# Patient Record
Sex: MALE | Race: WHITE | NOT HISPANIC OR LATINO | Employment: UNEMPLOYED | URBAN - NONMETROPOLITAN AREA
[De-identification: names, ages, dates, MRNs, and addresses within clinical notes are randomized per-mention and may not be internally consistent; named-entity substitution may affect disease eponyms.]

---

## 2017-01-18 DIAGNOSIS — M54.50 CHRONIC BILATERAL LOW BACK PAIN WITHOUT SCIATICA: ICD-10-CM

## 2017-01-18 DIAGNOSIS — G89.29 CHRONIC BILATERAL LOW BACK PAIN WITHOUT SCIATICA: ICD-10-CM

## 2017-01-18 RX ORDER — GABAPENTIN 400 MG/1
400 CAPSULE ORAL 3 TIMES DAILY
Qty: 90 CAPSULE | Refills: 2 | Status: SHIPPED | OUTPATIENT
Start: 2017-01-18 | End: 2017-05-10 | Stop reason: SDUPTHER

## 2017-01-20 ENCOUNTER — OFFICE VISIT (OUTPATIENT)
Dept: FAMILY MEDICINE CLINIC | Facility: CLINIC | Age: 52
End: 2017-01-20

## 2017-01-20 VITALS
WEIGHT: 183 LBS | HEIGHT: 70 IN | HEART RATE: 93 BPM | SYSTOLIC BLOOD PRESSURE: 124 MMHG | BODY MASS INDEX: 26.2 KG/M2 | DIASTOLIC BLOOD PRESSURE: 92 MMHG

## 2017-01-20 DIAGNOSIS — G89.29 CHRONIC BILATERAL LOW BACK PAIN WITHOUT SCIATICA: Primary | ICD-10-CM

## 2017-01-20 DIAGNOSIS — M54.50 CHRONIC BILATERAL LOW BACK PAIN WITHOUT SCIATICA: Primary | ICD-10-CM

## 2017-01-20 DIAGNOSIS — F41.1 GENERALIZED ANXIETY DISORDER: ICD-10-CM

## 2017-01-20 PROCEDURE — 99213 OFFICE O/P EST LOW 20 MIN: CPT | Performed by: FAMILY MEDICINE

## 2017-01-20 RX ORDER — PROPRANOLOL HYDROCHLORIDE 10 MG/1
TABLET ORAL
Refills: 4 | COMMUNITY
Start: 2017-01-11 | End: 2017-01-20

## 2017-01-20 NOTE — PROGRESS NOTES
Subjective   Tre Nielsen is a 51 y.o. male.     History of Present Illness   Mr. Nielsen presents today for follow-up on lower back pain.  He had an MRI since he was here last time.  He now has an appointment with Dr. Corbin.  He says that his pain has been pretty constant.  He had toradol and kenalog injections back in November and that seemed to help then but only lasted a short while.  Changed medication to celebrex and that hasn't seemed to pain.  Pain has been limiting his activity.  Pain is worse with movement.  He has done PT in the past.  He hasn't ever had epidural injections.    He says that he has had a lot of anxiety.  Has some slight depression as well.  He has been taking propranolol and buspar for that.  He doesn't feel that is helping him.  He feels nervous and anxious every time that he leaves the house.  Anxiety has gotten worse since his back pain has been getting worse.  He has been going to mental health downstairs.        Current Outpatient Prescriptions:   •  busPIRone (BUSPAR) 15 MG tablet, Take 15 mg by mouth 4 (Four) Times a Day., Disp: , Rfl:   •  celecoxib (CELEBREX) 200 MG capsule, Take 1 capsule by mouth 2 (Two) Times a Day., Disp: 60 capsule, Rfl: 2  •  clotrimazole-betamethasone (LOTRISONE) 1-0.05 % cream, Apply  topically 2 (two) times a day., Disp: 45 g, Rfl: 2  •  gabapentin (NEURONTIN) 400 MG capsule, Take 1 capsule by mouth 3 (Three) Times a Day., Disp: 90 capsule, Rfl: 2  •  propranolol (INDERAL) 10 MG tablet, TK 1 T PO BID PRN FOR ANXIETY, Disp: , Rfl: 4  •  simvastatin (ZOCOR) 5 MG tablet, Take 1 tablet by mouth every night., Disp: 90 tablet, Rfl: 2    The following portions of the patient's history were reviewed and updated as appropriate: allergies, current medications, past family history, past medical history, past social history, past surgical history and problem list.    Review of Systems   Constitutional: Positive for fatigue. Negative for activity change, appetite  "change and unexpected weight change.   Cardiovascular: Negative for chest pain, palpitations and leg swelling.   Gastrointestinal: Negative for abdominal distention, abdominal pain, constipation, nausea and vomiting.   Musculoskeletal: Positive for arthralgias, back pain, gait problem and myalgias. Negative for joint swelling.   Psychiatric/Behavioral: Positive for decreased concentration and dysphoric mood. Negative for self-injury, sleep disturbance and suicidal ideas. The patient is nervous/anxious.        Objective    Vitals:    01/20/17 1050   BP: 124/92   Pulse: 93   Weight: 183 lb (83 kg)   Height: 70\" (177.8 cm)     Physical Exam   Constitutional: He is oriented to person, place, and time. He appears well-developed and well-nourished. No distress.   Cardiovascular: Normal rate, regular rhythm and normal heart sounds.    No murmur heard.  No LE edema.   Pulmonary/Chest: Effort normal and breath sounds normal. No respiratory distress.   Abdominal: Soft. Bowel sounds are normal. He exhibits no distension. There is no tenderness.   Musculoskeletal:        Lumbar back: He exhibits tenderness, pain and spasm. He exhibits normal range of motion and no deformity.   Neurological: He is alert and oriented to person, place, and time.   Psychiatric: He has a normal mood and affect. His behavior is normal. Judgment and thought content normal.   Psychomotor agitation   Nursing note and vitals reviewed.      Assessment/Plan   Problems Addressed this Visit        Nervous and Auditory    Low back pain - Primary    Relevant Orders    Ambulatory Referral to Pain Management       Other    Generalized anxiety disorder    Relevant Medications    sertraline (ZOLOFT) 50 MG tablet        1.) Low back pain-  Continue current NSAIDS.  Keep appointment with Dr. Corbin.  Referred to Pain Management.    2.) CHENG-  Will try SSRI with buspar to see if that helps more.  RTC in 1.5 months or sooner PRN           "

## 2017-01-20 NOTE — MR AVS SNAPSHOT
Tre Nielsen   1/20/2017 11:00 AM   Office Visit    Dept Phone:  [phone]   Encounter #:  [id number]    Provider:  Erika Dykes MD   Department:  [facility] FAM[facility] MEDICINE                Your Full Care Plan              Today's Medication Changes          These changes are accurate as of: 1/20/17 11:38 AM.  If you have any questions, ask your nurse or doctor.               New Medication(s)Ordered:     betamethasone valerate 0.1 % ointment   Commonly known as:  VALISONE   Apply  topically 2 (Two) Times a Day.   Started by:  Erika Dykes MD       sertraline 50 MG tablet   Commonly known as:  ZOLOFT   Take 1 tablet by mouth Daily.   Started by:  Erika Dykes MD         Stop taking medication(s)listed here:     clotrimazole-betamethasone 1-0.05 % cream   Commonly known as:  LOTRISONE   Stopped by:  Erika Dykes MD           propranolol 10 MG tablet   Commonly known as:  INDERAL   Stopped by:  Erika Dykes MD                Where to Get Your Medications      These medications were sent to [facility] [address] [place] [address] [phone] [place] [id number] [address] [address]     Phone:  [phone]     betamethasone valerate 0.1 % ointment    sertraline 50 MG tablet                  Your Updated Medication List          This list is accurate as of: 1/20/17 11:38 AM.  Always use your most recent med list.                betamethasone valerate 0.1 % ointment   Commonly known as:  VALISONE   Apply  topically 2 (Two) Times a Day.       busPIRone 15 MG tablet   Commonly known as:  BUSPAR       celecoxib 200 MG capsule   Commonly known as:  CELEBREX   Take 1 capsule by mouth 2 (Two) Times a Day.       gabapentin 400 MG capsule   Commonly known as:  NEURONTIN   Take 1 capsule by mouth 3 (Three) Times a Day.       sertraline 50 MG tablet    "  Commonly known as:  ZOLOFT   Take 1 tablet by mouth Daily.       simvastatin 5 MG tablet   Commonly known as:  ZOCOR   Take 1 tablet by mouth every night.               We Performed the Following     Ambulatory Referral to Pain Management       You Were Diagnosed With        Codes Comments    Chronic bilateral low back pain without sciatica    -  Primary ICD-10-CM: M54.5, G89.29  ICD-9-CM: 724.2, 338.29     Generalized anxiety disorder     ICD-10-CM: F41.1  ICD-9-CM: 300.02       Instructions     None    Patient Instructions History      Upcoming Appointments     Visit Type Date Time Department    OFFICE VISIT 1/20/2017 11:00 AM [facility] 4TH    NEW PATIENT 1/23/2017  1:30 PM [facility] [place] [facility]    OFFICE VISIT 3/21/2017 10:45 AM [facility] [place] [facility] 4[facility]      MyChart Signup     Our records indicate that you have declined [facility] [facility] signup. If you would like to sign up for [employer], please email [email or web address] or call [phone] to obtain an activation code.             Other Info from Your Visit           Your Appointments     Jan 23, 2017  1:30 PM CST   New Patient with Gomez Corbin MD   [facility] ORTHOPEDICS (--)    [address]  [facility] [address] [phone]           Bring all previous medical records and films, along with current medications and insurance information.            Mar 21, 2017 10:45 AM CDT   Office Visit with Erika Dykes MD   [facility] FAMILY MEDICINE (--)    [address]  [address] [facility] [address] [phone]           Arrive 15 minutes prior to appointment.              Allergies     No Known Allergies      Reason for Visit     Follow-up recheck for chronic bilateral low back pain without sciatica    Elbow Pain chronic right elbow pain      Vital Signs     Blood Pressure Pulse Height Weight Body Mass Index Smoking Status    124/92 93 70\" (177.8 cm) " 183 lb (83 kg) 26.26 kg/m2 Current Every Day Smoker      Problems and Diagnoses Noted     Generalized anxiety disorder    Low back pain

## 2017-01-23 ENCOUNTER — OFFICE VISIT (OUTPATIENT)
Dept: ORTHOPEDIC SURGERY | Facility: CLINIC | Age: 52
End: 2017-01-23

## 2017-01-23 VITALS — WEIGHT: 184 LBS | HEIGHT: 70 IN | BODY MASS INDEX: 26.34 KG/M2

## 2017-01-23 DIAGNOSIS — G89.29 CHRONIC BILATERAL LOW BACK PAIN WITHOUT SCIATICA: Primary | ICD-10-CM

## 2017-01-23 DIAGNOSIS — M47.816 SPONDYLOSIS OF LUMBAR REGION WITHOUT MYELOPATHY OR RADICULOPATHY: ICD-10-CM

## 2017-01-23 DIAGNOSIS — M54.50 CHRONIC BILATERAL LOW BACK PAIN WITHOUT SCIATICA: Primary | ICD-10-CM

## 2017-01-23 PROCEDURE — 99203 OFFICE O/P NEW LOW 30 MIN: CPT | Performed by: ORTHOPAEDIC SURGERY

## 2017-01-23 NOTE — PROGRESS NOTES
Subjective   Tre Nielsen is a 51 y.o.  y.o. male    Chief Complaint   Patient presents with   • Lumbar Spine - Establish Care         Back Pain   This is a chronic problem. The current episode started more than 1 year ago. The problem occurs constantly. The problem is unchanged. The pain is present in the lumbar spine. The quality of the pain is described as burning, aching, cramping, shooting and stabbing. The pain is moderate. The pain is worse during the day. The symptoms are aggravated by bending, lying down, standing, sitting and twisting. Stiffness is present all day. Pertinent negatives include no abdominal pain, chest pain, dysuria, fever, headaches, numbness or weakness. Treatments tried: IM steroid injection. The treatment provided no relief.       Review of Systems   Constitutional: Positive for fatigue. Negative for appetite change, chills, diaphoresis, fever and unexpected weight change.   HENT: Negative.  Negative for congestion, dental problem, facial swelling, hearing loss, nosebleeds, postnasal drip, trouble swallowing and voice change.    Eyes: Negative.  Negative for pain, discharge, redness and itching.   Respiratory: Positive for cough. Negative for choking, chest tightness, shortness of breath, wheezing and stridor.    Cardiovascular: Negative.  Negative for chest pain, palpitations and leg swelling.   Gastrointestinal: Positive for nausea. Negative for abdominal pain, blood in stool, constipation and diarrhea.   Endocrine: Negative.  Negative for cold intolerance and heat intolerance.   Genitourinary: Negative.  Negative for dysuria, frequency, hematuria and urgency.   Musculoskeletal: Positive for back pain and neck pain. Negative for gait problem, joint swelling and neck stiffness.        Pain and stiffness in joints   Skin: Negative.  Negative for pallor and rash.   Allergic/Immunologic: Negative.    Neurological: Negative for syncope, facial asymmetry, speech difficulty, weakness,  "numbness and headaches.   Hematological: Negative.    Psychiatric/Behavioral: Positive for dysphoric mood. Negative for agitation, behavioral problems, confusion, decreased concentration and hallucinations. The patient is nervous/anxious. The patient is not hyperactive.        No Known Allergies      Current Outpatient Prescriptions:   •  betamethasone valerate (VALISONE) 0.1 % ointment, Apply  topically 2 (Two) Times a Day., Disp: 45 g, Rfl: 1  •  busPIRone (BUSPAR) 15 MG tablet, Take 15 mg by mouth 4 (Four) Times a Day., Disp: , Rfl:   •  celecoxib (CELEBREX) 200 MG capsule, Take 1 capsule by mouth 2 (Two) Times a Day., Disp: 60 capsule, Rfl: 2  •  gabapentin (NEURONTIN) 400 MG capsule, Take 1 capsule by mouth 3 (Three) Times a Day., Disp: 90 capsule, Rfl: 2  •  sertraline (ZOLOFT) 50 MG tablet, Take 1 tablet by mouth Daily., Disp: 30 tablet, Rfl: 2  •  simvastatin (ZOCOR) 5 MG tablet, Take 1 tablet by mouth every night., Disp: 90 tablet, Rfl: 2    No past surgical history on file.    Past Medical History   Diagnosis Date   • Candidiasis of skin and nails 02/03/2016   • Dyslipidemia 07/28/2015   • Joint pain 02/03/2016     RIGHT SHOULDER   • Low back pain 05/10/2016   • Muscle weakness 12/07/2015   • Pure hypercholesterolemia 07/28/2015   • Tobacco dependence syndrome 06/09/2015       Vitals:    01/23/17 1334   Weight: 184 lb (83.5 kg)   Height: 70\" (177.8 cm)           Objective     Physical Exam   Constitutional: He appears well-developed.   HENT:   Head: Normocephalic and atraumatic.   Eyes: Pupils are equal, round, and reactive to light. Right eye exhibits no discharge. Left eye exhibits no discharge.   Neck: Normal range of motion. No JVD present. No tracheal deviation present. No thyromegaly present.   Cardiovascular: Normal rate, regular rhythm, normal heart sounds and intact distal pulses.  Exam reveals no gallop and no friction rub.    No murmur heard.  Pulmonary/Chest: Effort normal and breath sounds " normal. No respiratory distress. He has no wheezes. He has no rales. He exhibits no tenderness.   Abdominal: Soft. Bowel sounds are normal. He exhibits no distension. There is no tenderness. There is no guarding.   Musculoskeletal: He exhibits no edema or deformity.        Thoracic back: He exhibits normal range of motion, no tenderness, no bony tenderness, no swelling, no edema, no deformity, no laceration and no pain.        Lumbar back: He exhibits decreased range of motion and tenderness. He exhibits no bony tenderness, no swelling, no edema, no deformity and no laceration.   Lymphadenopathy:     He has no cervical adenopathy.   Neurological: He is alert. He has normal reflexes. He displays normal reflexes. No cranial nerve deficit. Coordination normal.   Skin: Skin is warm. No rash noted. No erythema.   Psychiatric: He has a normal mood and affect. His behavior is normal. Thought content normal.     Tenderness: Lumbar   Swelling:  Mild       Range of Motion:      Flexion: 60     Extension: 5     Lateral Bend:   Right 10                              Left 10     Rotation:          Right 10                              Left 10       SLR:                  Right Negative                             Left Negative       Muscle Strength      Abductor: 5/5     Adductor: 5/5     Quadriceps: 5/5     Hamstrings: 5/5       Reflexes     Patellar: 1/4    Achilles: 1/4    Babinski: Not performed.    Biceps: 1/4    Triceps: 1/4       Sensation: Normal   Gait: Normal   Toe Walk: exam deferred   Heel Walk: exam deferred         Assessment:  Tre was seen today for establish care.    Diagnoses and all orders for this visit:    Chronic bilateral low back pain without sciatica        Plan:    Active Ambulatory Problems     Diagnosis Date Noted   • Tobacco dependence syndrome 06/09/2015   • Pure hypercholesterolemia 07/28/2015   • Low back pain 05/10/2016   • Dyslipidemia 07/28/2015   • Generalized anxiety disorder 01/20/2017   •  Chronic bilateral low back pain without sciatica 01/23/2017     Resolved Ambulatory Problems     Diagnosis Date Noted   • No Resolved Ambulatory Problems     Past Medical History   Diagnosis Date   • Candidiasis of skin and nails 02/03/2016   • Joint pain 02/03/2016   • Muscle weakness 12/07/2015         Recommend lumbar epidural steroid injection L4-5, left.  Risks of interventional pain management are reviewed with him.        Signed by, Gomez Corbin MD

## 2017-01-23 NOTE — LETTER
January 23, 2017    Tre Nielsen  [address] [facility] [address]        Lumbar transforaminal epidural steroid injection L4-5, left   [id number]                            Gomez Corbin MD

## 2017-01-23 NOTE — MR AVS SNAPSHOT
[facility] ORTHOPEDICS  [phone]                    Tre Nielsen   1/23/2017 1:30 PM   Office Visit    Dept Phone:  [phone]   Encounter #:  [id number]    Provider:  Gomez Corbin MD   Department:  [facility] ORTHOPEDICS                Your Full Care Plan              Your Updated Medication List          This list is accurate as of: 1/23/17  2:37 PM.  Always use your most recent med list.                betamethasone valerate 0.1 % ointment   Commonly known as:  VALISONE   Apply  topically 2 (Two) Times a Day.       busPIRone 15 MG tablet   Commonly known as:  BUSPAR       celecoxib 200 MG capsule   Commonly known as:  CELEBREX   Take 1 capsule by mouth 2 (Two) Times a Day.       gabapentin 400 MG capsule   Commonly known as:  NEURONTIN   Take 1 capsule by mouth 3 (Three) Times a Day.       sertraline 50 MG tablet   Commonly known as:  ZOLOFT   Take 1 tablet by mouth Daily.       simvastatin 5 MG tablet   Commonly known as:  ZOCOR   Take 1 tablet by mouth every night.               You Were Diagnosed With        Codes Comments    Chronic bilateral low back pain without sciatica    -  Primary ICD-10-CM: M54.5, G89.29  ICD-9-CM: 724.2, 338.29     Spondylosis of lumbar region without myelopathy or radiculopathy     ICD-10-CM: M47.816  ICD-9-CM: 721.3       Instructions     None    Patient Instructions History      Upcoming Appointments     Visit Type Date Time Department    NEW PATIENT 1/23/2017  1:30 PM [facility]    OFFICE VISIT 3/21/2017 10:45 AM [facility] 4[facility]    NEW PATIENT 5/1/2017  8:00 AM [facility]      MyChart Signup     Our records indicate that you have declined [facility] [employer] signup. If you would like to sign up for [employer], please email [email or web address] or call [phone] to obtain an activation code.             Other Info from Your Visit           Your Appointments     Mar 21, 2017 10:45 AM CDT    "  Office Visit with Erika Dykes MD   [facility] FAMILY MEDICINE (--)    [address]  [address] [facility] [address] [phone]           Arrive 15 minutes prior to appointment.            May 01, 2017  8:00 AM CDT   New Patient with LINDA Roberts   [facility] PAIN MANAGEMENT (--)    [address]  [address] [place] [address] [phone]           Bring all previous medical records and films, along with current medications and insurance information.              Allergies     No Known Allergies      Reason for Visit     Lumbar Spine - Establish Care           Vital Signs     Height Weight Body Mass Index Smoking Status          70\" (177.8 cm) 184 lb (83.5 kg) 26.4 kg/m2 Current Every Day Smoker        Problems and Diagnoses Noted     Chronic bilateral low back pain without sciatica    Degenerative arthritis of lumbar spine        "

## 2017-01-25 DIAGNOSIS — M54.50 CHRONIC BILATERAL LOW BACK PAIN WITHOUT SCIATICA: ICD-10-CM

## 2017-01-25 DIAGNOSIS — G89.29 CHRONIC BILATERAL LOW BACK PAIN WITHOUT SCIATICA: ICD-10-CM

## 2017-01-25 RX ORDER — CELECOXIB 200 MG/1
200 CAPSULE ORAL 2 TIMES DAILY
Qty: 60 CAPSULE | Refills: 2 | Status: SHIPPED | OUTPATIENT
Start: 2017-01-25 | End: 2017-05-08 | Stop reason: SDUPTHER

## 2017-02-03 ENCOUNTER — TELEPHONE (OUTPATIENT)
Dept: ORTHOPEDIC SURGERY | Facility: CLINIC | Age: 52
End: 2017-02-03

## 2017-02-17 ENCOUNTER — TELEPHONE (OUTPATIENT)
Dept: ORTHOPEDIC SURGERY | Facility: CLINIC | Age: 52
End: 2017-02-17

## 2017-02-17 NOTE — TELEPHONE ENCOUNTER
Pt called today asking about epidural injections.  i have tried to reach the patient on numerous occasions with no answer.  Tried again today no answer cass

## 2017-02-20 DIAGNOSIS — G89.29 CHRONIC BILATERAL LOW BACK PAIN WITHOUT SCIATICA: Primary | ICD-10-CM

## 2017-02-20 DIAGNOSIS — M54.50 CHRONIC BILATERAL LOW BACK PAIN WITHOUT SCIATICA: Primary | ICD-10-CM

## 2017-03-01 ENCOUNTER — HOSPITAL ENCOUNTER (OUTPATIENT)
Dept: INTERVENTIONAL RADIOLOGY/VASCULAR | Facility: HOSPITAL | Age: 52
Discharge: HOME | End: 2017-03-01
Admitting: ORTHOPAEDIC SURGERY

## 2017-03-01 VITALS
HEART RATE: 77 BPM | RESPIRATION RATE: 20 BRPM | DIASTOLIC BLOOD PRESSURE: 76 MMHG | OXYGEN SATURATION: 96 % | SYSTOLIC BLOOD PRESSURE: 106 MMHG

## 2017-03-01 DIAGNOSIS — G89.29 CHRONIC BILATERAL LOW BACK PAIN WITHOUT SCIATICA: ICD-10-CM

## 2017-03-01 DIAGNOSIS — M54.50 CHRONIC BILATERAL LOW BACK PAIN WITHOUT SCIATICA: ICD-10-CM

## 2017-03-01 PROCEDURE — 0 IOPAMIDOL 41 % SOLUTION: Performed by: ORTHOPAEDIC SURGERY

## 2017-03-01 PROCEDURE — 64483 NJX AA&/STRD TFRM EPI L/S 1: CPT | Performed by: ORTHOPAEDIC SURGERY

## 2017-03-01 PROCEDURE — 25010000002 METHYLPREDNISOLONE PER 80 MG: Performed by: ORTHOPAEDIC SURGERY

## 2017-03-01 RX ORDER — BUPIVACAINE HYDROCHLORIDE 5 MG/ML
INJECTION, SOLUTION EPIDURAL; INTRACAUDAL
Status: COMPLETED | OUTPATIENT
Start: 2017-03-01 | End: 2017-03-01

## 2017-03-01 RX ORDER — METHYLPREDNISOLONE ACETATE 80 MG/ML
INJECTION, SUSPENSION INTRA-ARTICULAR; INTRALESIONAL; INTRAMUSCULAR; SOFT TISSUE
Status: COMPLETED | OUTPATIENT
Start: 2017-03-01 | End: 2017-03-01

## 2017-03-01 RX ADMIN — METHYLPREDNISOLONE ACETATE 80 MG: 80 INJECTION, SUSPENSION INTRA-ARTICULAR; INTRALESIONAL; INTRAMUSCULAR; SOFT TISSUE at 14:23

## 2017-03-01 RX ADMIN — BUPIVACAINE HYDROCHLORIDE 10 ML: 5 INJECTION, SOLUTION EPIDURAL; INTRACAUDAL; PERINEURAL at 14:23

## 2017-03-01 RX ADMIN — IOPAMIDOL 5 ML: 408 INJECTION, SOLUTION INTRATHECAL at 14:22

## 2017-03-21 ENCOUNTER — OFFICE VISIT (OUTPATIENT)
Dept: FAMILY MEDICINE CLINIC | Facility: CLINIC | Age: 52
End: 2017-03-21

## 2017-03-21 VITALS
DIASTOLIC BLOOD PRESSURE: 82 MMHG | SYSTOLIC BLOOD PRESSURE: 110 MMHG | BODY MASS INDEX: 25.34 KG/M2 | HEIGHT: 70 IN | WEIGHT: 177 LBS

## 2017-03-21 DIAGNOSIS — G89.29 CHRONIC BILATERAL LOW BACK PAIN WITHOUT SCIATICA: ICD-10-CM

## 2017-03-21 DIAGNOSIS — M54.50 CHRONIC BILATERAL LOW BACK PAIN WITHOUT SCIATICA: ICD-10-CM

## 2017-03-21 DIAGNOSIS — F41.1 GENERALIZED ANXIETY DISORDER: Primary | ICD-10-CM

## 2017-03-21 PROCEDURE — 99213 OFFICE O/P EST LOW 20 MIN: CPT | Performed by: FAMILY MEDICINE

## 2017-03-21 RX ORDER — CYCLOBENZAPRINE HCL 10 MG
10 TABLET ORAL 2 TIMES DAILY PRN
Qty: 60 TABLET | Refills: 0 | Status: SHIPPED | OUTPATIENT
Start: 2017-03-21 | End: 2017-04-19 | Stop reason: SDUPTHER

## 2017-03-21 RX ORDER — BUSPIRONE HYDROCHLORIDE 7.5 MG/1
7.5 TABLET ORAL 3 TIMES DAILY
Qty: 90 TABLET | Refills: 2 | Status: SHIPPED | OUTPATIENT
Start: 2017-03-21 | End: 2017-04-18

## 2017-03-21 NOTE — PROGRESS NOTES
Subjective   Tre Nielsen is a 51 y.o. male.     History of Present Illness   Mr. Nielsen is a 50yo male that presents today for follow-up on anxiety. He was started on zoloft last time and that has really helped his anxiety. No side effects with her sertraline. Had stopped taking propranolol.  He has been having a lot of issues with back pain and joint pain.  He is on celebrex and that is helping with his arthritis pain.  He has been taking gabapentin and that is helping his back pain.  He saw Dr. Corbin and had back injections and those didn't seem to help.  He has an appointment with Pain Management in a month.  Says that his back pain is the worse.  It is about a 6/10.  Other joint pain isn't as severe.  He hasn't been taking any Tylenol between his celebrex. That alone hadn't helped.        Current Outpatient Prescriptions:   •  betamethasone valerate (VALISONE) 0.1 % ointment, Apply  topically 2 (Two) Times a Day., Disp: 45 g, Rfl: 1  •  busPIRone (BUSPAR) 15 MG tablet, Take 15 mg by mouth 4 (Four) Times a Day., Disp: , Rfl:   •  celecoxib (CELEBREX) 200 MG capsule, Take 1 capsule by mouth 2 (Two) Times a Day., Disp: 60 capsule, Rfl: 2  •  gabapentin (NEURONTIN) 400 MG capsule, Take 1 capsule by mouth 3 (Three) Times a Day., Disp: 90 capsule, Rfl: 2  •  sertraline (ZOLOFT) 50 MG tablet, Take 1 tablet by mouth Daily., Disp: 30 tablet, Rfl: 2  •  simvastatin (ZOCOR) 5 MG tablet, Take 1 tablet by mouth every night., Disp: 90 tablet, Rfl: 2    The following portions of the patient's history were reviewed and updated as appropriate: allergies, current medications, past family history, past medical history, past social history, past surgical history and problem list.    Review of Systems   Constitutional: Positive for fatigue. Negative for activity change, appetite change and unexpected weight change.   Respiratory: Negative for cough, chest tightness and shortness of breath.    Cardiovascular: Negative for chest  "pain, palpitations and leg swelling.   Gastrointestinal: Negative for abdominal distention, abdominal pain, constipation, nausea and vomiting.   Musculoskeletal: Positive for arthralgias, back pain, gait problem and myalgias. Negative for joint swelling.   Psychiatric/Behavioral: Negative for decreased concentration, dysphoric mood, self-injury, sleep disturbance and suicidal ideas. The patient is not nervous/anxious.        Objective    Vitals:    03/21/17 1042   BP: 110/82   Weight: 177 lb (80.3 kg)   Height: 70\" (177.8 cm)       Physical Exam   Constitutional: He is oriented to person, place, and time. He appears well-developed and well-nourished. No distress.   Cardiovascular: Normal rate, regular rhythm and normal heart sounds.    No murmur heard.  No LE edema.   Pulmonary/Chest: Effort normal and breath sounds normal. No respiratory distress.   Abdominal: Soft. Bowel sounds are normal. He exhibits no distension. There is no tenderness.   Musculoskeletal:        Lumbar back: He exhibits tenderness, pain and spasm. He exhibits normal range of motion and no deformity.   Neurological: He is alert and oriented to person, place, and time.   Psychiatric: He has a normal mood and affect. His behavior is normal. Judgment and thought content normal.   Nursing note and vitals reviewed.      Assessment/Plan   Problems Addressed this Visit        Nervous and Auditory    Low back pain       Other    Generalized anxiety disorder - Primary    Relevant Medications    busPIRone (BUSPAR) 7.5 MG tablet        1.) CHENG-  Has been doing much better on sertraline.  Will titrate off buspar because he doesn't think it is working well.  2.) Low back pain-  Will keep appointment with pain management.  Likely not willing to try epidurals again. Will try tylenol between Celebrex.  Flexeril PRN muscle spasms.  RTC in 1 month or sooner PRN           "

## 2017-04-18 ENCOUNTER — OFFICE VISIT (OUTPATIENT)
Dept: FAMILY MEDICINE CLINIC | Facility: CLINIC | Age: 52
End: 2017-04-18

## 2017-04-18 VITALS
BODY MASS INDEX: 25.48 KG/M2 | DIASTOLIC BLOOD PRESSURE: 90 MMHG | HEIGHT: 70 IN | SYSTOLIC BLOOD PRESSURE: 130 MMHG | WEIGHT: 178 LBS

## 2017-04-18 DIAGNOSIS — G89.29 CHRONIC BILATERAL LOW BACK PAIN WITHOUT SCIATICA: Primary | ICD-10-CM

## 2017-04-18 DIAGNOSIS — M54.50 CHRONIC BILATERAL LOW BACK PAIN WITHOUT SCIATICA: Primary | ICD-10-CM

## 2017-04-18 PROCEDURE — 99213 OFFICE O/P EST LOW 20 MIN: CPT | Performed by: FAMILY MEDICINE

## 2017-04-18 RX ORDER — LIDOCAINE 50 MG/G
OINTMENT TOPICAL
COMMUNITY
Start: 2017-01-23 | End: 2017-06-20

## 2017-04-18 NOTE — PROGRESS NOTES
Subjective   Tre Nielsen is a 51 y.o. male.     History of Present Illness   Mr. Nielsen is a 52yo male that presents today for follow-up on chronic low back pain. He has been doing fair most days.  He has tried the epidurals. He has been taking celebrex and flexeril PRN. That has been helping.  He didn't do well with the epidurals and they didn't help him.  He does get some relief from steroid injections and would like to try that.  He is wondering what it will take for him to have a back surgery.  He really wants relief from his back.  He is miserable and would like to have it fixed.      Current Outpatient Prescriptions:   •  betamethasone valerate (VALISONE) 0.1 % ointment, Apply  topically 2 (Two) Times a Day., Disp: 45 g, Rfl: 1  •  celecoxib (CELEBREX) 200 MG capsule, Take 1 capsule by mouth 2 (Two) Times a Day., Disp: 60 capsule, Rfl: 2  •  cyclobenzaprine (FLEXERIL) 10 MG tablet, Take 1 tablet by mouth 2 (Two) Times a Day As Needed for Muscle Spasms., Disp: 60 tablet, Rfl: 0  •  gabapentin (NEURONTIN) 400 MG capsule, Take 1 capsule by mouth 3 (Three) Times a Day., Disp: 90 capsule, Rfl: 2  •  lidocaine (XYLOCAINE) 5 % ointment, , Disp: , Rfl:   •  sertraline (ZOLOFT) 50 MG tablet, Take 1 tablet by mouth Daily., Disp: 30 tablet, Rfl: 2  •  simvastatin (ZOCOR) 5 MG tablet, Take 1 tablet by mouth every night., Disp: 90 tablet, Rfl: 2    The following portions of the patient's history were reviewed and updated as appropriate: allergies, current medications, past family history, past medical history, past social history, past surgical history and problem list.    Review of Systems   Constitutional: Positive for fatigue. Negative for activity change, appetite change and unexpected weight change.   Respiratory: Negative for cough, chest tightness and shortness of breath.    Cardiovascular: Negative for chest pain, palpitations and leg swelling.   Gastrointestinal: Negative for abdominal distention, abdominal  "pain, constipation, nausea and vomiting.   Musculoskeletal: Positive for arthralgias, back pain, gait problem and myalgias. Negative for joint swelling.   Psychiatric/Behavioral: Negative for decreased concentration, dysphoric mood, self-injury, sleep disturbance and suicidal ideas. The patient is not nervous/anxious.        Objective    Vitals:    04/18/17 1514   BP: 130/90   Weight: 178 lb (80.7 kg)   Height: 70\" (177.8 cm)     Physical Exam   Constitutional: He is oriented to person, place, and time. He appears well-developed and well-nourished. No distress.   Cardiovascular: Normal rate, regular rhythm and normal heart sounds.    No murmur heard.  No LE edema.   Pulmonary/Chest: Effort normal and breath sounds normal. No respiratory distress.   Abdominal: Soft. Bowel sounds are normal. He exhibits no distension. There is no tenderness.   Musculoskeletal:        Lumbar back: He exhibits tenderness, pain and spasm. He exhibits normal range of motion and no deformity.   Neurological: He is alert and oriented to person, place, and time.   Psychiatric: He has a normal mood and affect. His behavior is normal. Judgment and thought content normal.   Nursing note and vitals reviewed.      Assessment/Plan   Problems Addressed this Visit        Nervous and Auditory    Low back pain - Primary    Relevant Orders    Ambulatory Referral to Neurosurgery        Encouraged him to discuss with  if there would be any options for treatments.  Isn't interested in pain medication at this time.  Will continue current medications.  Will get second opinion from Dr. Perkins.  He has done PT.  He has had MRI. He has tried NSAIDS, muscle relaxers PRN and that helps a little.  RTC in 2-3 months or sooner PRN           "

## 2017-04-19 DIAGNOSIS — F41.1 GENERALIZED ANXIETY DISORDER: ICD-10-CM

## 2017-04-19 RX ORDER — CYCLOBENZAPRINE HCL 10 MG
10 TABLET ORAL 2 TIMES DAILY PRN
Qty: 60 TABLET | Refills: 2 | Status: SHIPPED | OUTPATIENT
Start: 2017-04-19 | End: 2017-07-14 | Stop reason: SDUPTHER

## 2017-04-20 DIAGNOSIS — M54.50 BILATERAL LOW BACK PAIN WITHOUT SCIATICA: ICD-10-CM

## 2017-04-20 RX ORDER — GABAPENTIN 300 MG/1
CAPSULE ORAL
Qty: 180 CAPSULE | Refills: 1 | Status: SHIPPED | OUTPATIENT
Start: 2017-04-20 | End: 2017-05-10

## 2017-05-01 ENCOUNTER — OFFICE VISIT (OUTPATIENT)
Dept: PAIN MEDICINE | Facility: CLINIC | Age: 52
End: 2017-05-01

## 2017-05-01 VITALS
HEIGHT: 70 IN | DIASTOLIC BLOOD PRESSURE: 80 MMHG | BODY MASS INDEX: 24.77 KG/M2 | WEIGHT: 173 LBS | SYSTOLIC BLOOD PRESSURE: 140 MMHG

## 2017-05-01 DIAGNOSIS — M46.1 SACROILIITIS (HCC): ICD-10-CM

## 2017-05-01 DIAGNOSIS — G89.29 CHRONIC BILATERAL LOW BACK PAIN WITHOUT SCIATICA: Primary | ICD-10-CM

## 2017-05-01 DIAGNOSIS — M54.50 CHRONIC BILATERAL LOW BACK PAIN WITHOUT SCIATICA: Primary | ICD-10-CM

## 2017-05-01 PROCEDURE — 99204 OFFICE O/P NEW MOD 45 MIN: CPT | Performed by: NURSE PRACTITIONER

## 2017-05-08 ENCOUNTER — HOSPITAL ENCOUNTER (OUTPATIENT)
Dept: INTERVENTIONAL RADIOLOGY/VASCULAR | Facility: HOSPITAL | Age: 52
Discharge: HOME | End: 2017-05-08
Admitting: FAMILY MEDICINE

## 2017-05-08 VITALS
RESPIRATION RATE: 18 BRPM | OXYGEN SATURATION: 96 % | DIASTOLIC BLOOD PRESSURE: 94 MMHG | HEART RATE: 89 BPM | SYSTOLIC BLOOD PRESSURE: 139 MMHG

## 2017-05-08 DIAGNOSIS — M54.50 CHRONIC BILATERAL LOW BACK PAIN WITHOUT SCIATICA: ICD-10-CM

## 2017-05-08 DIAGNOSIS — G89.29 CHRONIC BILATERAL LOW BACK PAIN WITHOUT SCIATICA: ICD-10-CM

## 2017-05-08 PROCEDURE — 0 IOPAMIDOL 41 % SOLUTION: Performed by: PAIN MEDICINE

## 2017-05-08 PROCEDURE — 25010000002 METHYLPREDNISOLONE PER 80 MG: Performed by: PAIN MEDICINE

## 2017-05-08 PROCEDURE — 62323 NJX INTERLAMINAR LMBR/SAC: CPT | Performed by: PAIN MEDICINE

## 2017-05-08 RX ORDER — METHYLPREDNISOLONE ACETATE 80 MG/ML
INJECTION, SUSPENSION INTRA-ARTICULAR; INTRALESIONAL; INTRAMUSCULAR; SOFT TISSUE
Status: COMPLETED | OUTPATIENT
Start: 2017-05-08 | End: 2017-05-08

## 2017-05-08 RX ORDER — LIDOCAINE HYDROCHLORIDE 20 MG/ML
INJECTION, SOLUTION INFILTRATION; PERINEURAL
Status: COMPLETED | OUTPATIENT
Start: 2017-05-08 | End: 2017-05-08

## 2017-05-08 RX ORDER — CELECOXIB 200 MG/1
200 CAPSULE ORAL 2 TIMES DAILY
Qty: 60 CAPSULE | Refills: 2 | Status: SHIPPED | OUTPATIENT
Start: 2017-05-08 | End: 2017-08-14 | Stop reason: SDUPTHER

## 2017-05-08 RX ADMIN — METHYLPREDNISOLONE ACETATE 80 MG: 80 INJECTION, SUSPENSION INTRA-ARTICULAR; INTRALESIONAL; INTRAMUSCULAR; SOFT TISSUE at 13:06

## 2017-05-08 RX ADMIN — IOPAMIDOL 2 ML: 408 INJECTION, SOLUTION INTRATHECAL at 13:07

## 2017-05-08 RX ADMIN — LIDOCAINE HYDROCHLORIDE 2 ML: 20 INJECTION, SOLUTION INFILTRATION; PERINEURAL at 13:06

## 2017-05-10 DIAGNOSIS — M54.50 CHRONIC BILATERAL LOW BACK PAIN WITHOUT SCIATICA: ICD-10-CM

## 2017-05-10 DIAGNOSIS — G89.29 CHRONIC BILATERAL LOW BACK PAIN WITHOUT SCIATICA: ICD-10-CM

## 2017-05-10 RX ORDER — GABAPENTIN 400 MG/1
400 CAPSULE ORAL 3 TIMES DAILY
Qty: 90 CAPSULE | Refills: 2 | Status: SHIPPED | OUTPATIENT
Start: 2017-05-10 | End: 2017-08-14 | Stop reason: SDUPTHER

## 2017-05-23 ENCOUNTER — TELEPHONE (OUTPATIENT)
Dept: FAMILY MEDICINE CLINIC | Facility: CLINIC | Age: 52
End: 2017-05-23

## 2017-06-20 ENCOUNTER — OFFICE VISIT (OUTPATIENT)
Dept: FAMILY MEDICINE CLINIC | Facility: CLINIC | Age: 52
End: 2017-06-20

## 2017-06-20 VITALS
SYSTOLIC BLOOD PRESSURE: 130 MMHG | DIASTOLIC BLOOD PRESSURE: 82 MMHG | HEIGHT: 70 IN | BODY MASS INDEX: 24.69 KG/M2 | WEIGHT: 172.5 LBS

## 2017-06-20 DIAGNOSIS — G89.29 CHRONIC HIP PAIN, UNSPECIFIED LATERALITY: ICD-10-CM

## 2017-06-20 DIAGNOSIS — Z71.6 ENCOUNTER FOR SMOKING CESSATION COUNSELING: Chronic | ICD-10-CM

## 2017-06-20 DIAGNOSIS — M54.5 CHRONIC BILATERAL LOW BACK PAIN, WITH SCIATICA PRESENCE UNSPECIFIED: Primary | ICD-10-CM

## 2017-06-20 DIAGNOSIS — G89.29 CHRONIC BILATERAL LOW BACK PAIN, WITH SCIATICA PRESENCE UNSPECIFIED: Primary | ICD-10-CM

## 2017-06-20 DIAGNOSIS — M25.559 CHRONIC HIP PAIN, UNSPECIFIED LATERALITY: ICD-10-CM

## 2017-06-20 PROCEDURE — 99213 OFFICE O/P EST LOW 20 MIN: CPT | Performed by: FAMILY MEDICINE

## 2017-06-20 NOTE — PROGRESS NOTES
Subjective   Tre Nielsen is a 51 y.o. male.     History of Present Illness   Mr. Nielsen is a 50yo male that present today for follow-up on chronic back pain and joint pain.  Says that he has been doing much better. Has been able to do more and has been eating and sleeping better.  He had epidural from Dr. Royal helped him a lot. He has been taking celebrex and gabapentin as needed for pain.    He has seen Dr. Perkins since the last time that he was here. He thinks that a lot of the issue may be his hips.  He hasn't had imaging of his hips.  He feels that his left hip is popping out of place a lot.    Happens when he is walking and that is random.  Says that he feels grinding in his low back but isn't sure that it isn't his hip on the left side.    He has been smoking for a long time. Knows that he needs to quit. He smokes less then one ppd.  He quit in the past for a month. He quit cold turkey and then restarted.        Current Outpatient Prescriptions:   •  betamethasone valerate (VALISONE) 0.1 % ointment, Apply  topically 2 (Two) Times a Day., Disp: 45 g, Rfl: 1  •  celecoxib (CELEBREX) 200 MG capsule, Take 1 capsule by mouth 2 (Two) Times a Day., Disp: 60 capsule, Rfl: 2  •  cyclobenzaprine (FLEXERIL) 10 MG tablet, Take 1 tablet by mouth 2 (Two) Times a Day As Needed for Muscle Spasms., Disp: 60 tablet, Rfl: 2  •  gabapentin (NEURONTIN) 400 MG capsule, Take 1 capsule by mouth 3 (Three) Times a Day., Disp: 90 capsule, Rfl: 2  •  sertraline (ZOLOFT) 50 MG tablet, Take 1 tablet by mouth Daily., Disp: 30 tablet, Rfl: 2  •  simvastatin (ZOCOR) 5 MG tablet, Take 1 tablet by mouth every night., Disp: 90 tablet, Rfl: 2    The following portions of the patient's history were reviewed and updated as appropriate: allergies, current medications, past family history, past medical history, past social history, past surgical history and problem list.    Review of Systems   Constitutional: Negative for activity change,  "appetite change, fatigue and unexpected weight change.   Respiratory: Negative for cough, chest tightness and shortness of breath.    Cardiovascular: Negative for chest pain, palpitations and leg swelling.   Gastrointestinal: Negative for abdominal distention, abdominal pain, constipation, nausea and vomiting.   Musculoskeletal: Positive for arthralgias, back pain, gait problem and myalgias. Negative for joint swelling.   Psychiatric/Behavioral: Negative for decreased concentration, dysphoric mood, self-injury, sleep disturbance and suicidal ideas. The patient is not nervous/anxious.        Objective    Vitals:    06/20/17 1117   BP: 130/82   Weight: 172 lb 8 oz (78.2 kg)   Height: 70\" (177.8 cm)       Physical Exam   Constitutional: He is oriented to person, place, and time. He appears well-developed and well-nourished. No distress.   Cardiovascular: Normal rate, regular rhythm and normal heart sounds.    No murmur heard.  No LE edema.   Pulmonary/Chest: Effort normal and breath sounds normal. No respiratory distress.   Abdominal: Soft. Bowel sounds are normal. He exhibits no distension. There is no tenderness.   Musculoskeletal:        Lumbar back: He exhibits tenderness, pain and spasm. He exhibits normal range of motion and no deformity.   Neurological: He is alert and oriented to person, place, and time.   Psychiatric: He has a normal mood and affect. His behavior is normal. Judgment and thought content normal.   Nursing note and vitals reviewed.      Assessment/Plan   Problems Addressed this Visit        Nervous and Auditory    Low back pain - Primary    Relevant Orders    XR hips bilateral w or wo pelvis 2 view (Completed)      Other Visit Diagnoses     Chronic hip pain, unspecified laterality        Relevant Orders    XR hips bilateral w or wo pelvis 2 view (Completed)    Encounter for smoking cessation counseling  (Chronic)           He seems to be doing much better and says that his last injection helped a " lot.  Will get x-rays of hips.  Continue NSAIDS PRN.  Follow-up with Pain Management as planned.  Will start trying to quit smoking. Patches have helped him a little in the past.  Will start on 21mcg and titrate down as needed.  RTC in 2-3 months or sooner PRN

## 2017-06-27 ENCOUNTER — OFFICE VISIT (OUTPATIENT)
Dept: PAIN MEDICINE | Facility: CLINIC | Age: 52
End: 2017-06-27

## 2017-06-27 VITALS
HEIGHT: 70 IN | DIASTOLIC BLOOD PRESSURE: 80 MMHG | BODY MASS INDEX: 24.64 KG/M2 | WEIGHT: 172.1 LBS | SYSTOLIC BLOOD PRESSURE: 130 MMHG

## 2017-06-27 DIAGNOSIS — G89.29 CHRONIC BILATERAL LOW BACK PAIN WITHOUT SCIATICA: ICD-10-CM

## 2017-06-27 DIAGNOSIS — M46.1 SACROILIITIS (HCC): Primary | ICD-10-CM

## 2017-06-27 DIAGNOSIS — M54.50 CHRONIC BILATERAL LOW BACK PAIN WITHOUT SCIATICA: ICD-10-CM

## 2017-06-27 PROCEDURE — 99212 OFFICE O/P EST SF 10 MIN: CPT | Performed by: NURSE PRACTITIONER

## 2017-06-27 NOTE — PROGRESS NOTES
Tre Nielsen is a 51 y.o. male.   1965    HPI:   Location: lower back and bilateral hip  Quality: aching, sharp and dull  Severity: 4/10  Timing: constant  Alleviating: pain medication  Aggravating: increased activity    ROMAN helped quite well.  He started helping some neighbors lift some items and it started aggravating him.  Having significant buttock pain on left currently.  Still doing exercises at home.     The following portions of the patient's history were reviewed by me and updated as appropriate: allergies, current medications, past family history, past medical history, past social history, past surgical history and problem list.    Past Medical History:   Diagnosis Date   • Candidiasis of skin and nails 02/03/2016   • Chronic pain disorder    • Depression    • Dyslipidemia 07/28/2015   • Joint pain 02/03/2016    RIGHT SHOULDER   • Low back pain 05/10/2016   • Muscle weakness 12/07/2015   • Pure hypercholesterolemia 07/28/2015   • Tobacco dependence syndrome 06/09/2015       Social History     Social History   • Marital status:      Spouse name: N/A   • Number of children: N/A   • Years of education: N/A     Occupational History   • Not on file.     Social History Main Topics   • Smoking status: Current Every Day Smoker   • Smokeless tobacco: Never Used   • Alcohol use Yes   • Drug use: Yes     Special: Marijuana   • Sexual activity: Defer     Other Topics Concern   • Not on file     Social History Narrative       Family History   Problem Relation Age of Onset   • Diabetes Mother    • Migraines Sister    • Diabetes Maternal Grandmother          Current Outpatient Prescriptions:   •  betamethasone valerate (VALISONE) 0.1 % ointment, Apply  topically 2 (Two) Times a Day., Disp: 45 g, Rfl: 1  •  celecoxib (CELEBREX) 200 MG capsule, Take 1 capsule by mouth 2 (Two) Times a Day., Disp: 60 capsule, Rfl: 2  •  cyclobenzaprine (FLEXERIL) 10 MG tablet, Take 1 tablet by mouth 2 (Two) Times a Day As  Needed for Muscle Spasms., Disp: 60 tablet, Rfl: 2  •  gabapentin (NEURONTIN) 400 MG capsule, Take 1 capsule by mouth 3 (Three) Times a Day., Disp: 90 capsule, Rfl: 2  •  nicotine (HM NICOTINE) 7 MG/24HR patch, Place 1 patch on the skin Daily., Disp: 30 patch, Rfl: 2  •  sertraline (ZOLOFT) 50 MG tablet, Take 1 tablet by mouth Daily., Disp: 30 tablet, Rfl: 2  •  simvastatin (ZOCOR) 5 MG tablet, Take 1 tablet by mouth every night., Disp: 90 tablet, Rfl: 2    No Known Allergies    Review of Systems   Musculoskeletal: Positive for back pain (lower).        Bilateral hip pain   All other systems reviewed and are negative.    All systems reviewed and negative except for above.    Physical Exam   Constitutional: He appears well-developed and well-nourished. No distress.   Musculoskeletal:   Pos left woodrow and compression tests.  ttp over left si joint.    Neurological: He is alert.   Psychiatric: He has a normal mood and affect. His behavior is normal. Judgment normal.       Tre was seen today for back pain and hip pain.    Diagnoses and all orders for this visit:    Sacroiliitis  -     SI Joint Injection; Future    Chronic bilateral low back pain without sciatica        Medication: None at this time.  On neurontin, celebrex and flexeril.     Interventional:  I have discussed the risks and benefits of the procedure with the patient.  left si joint injection.  Pleased he had good relief from lesi.  But today's exam is most c/w si pain.      Rehab: none at this time.  Still does home exercises.  Last PT for low back was about 6 mos ago.    Behavioral: No aberrant behavior noted. BRITTANY Report #[id number]  was reviewed and is consistent with stated history    Urine drug screen None at this time          This document has been electronically signed by Len Royal MD on June 27, 2017 3:49 PM

## 2017-07-14 DIAGNOSIS — F41.1 GENERALIZED ANXIETY DISORDER: ICD-10-CM

## 2017-07-14 RX ORDER — SIMVASTATIN 5 MG
TABLET ORAL
Qty: 90 TABLET | Refills: 0 | Status: SHIPPED | OUTPATIENT
Start: 2017-07-14 | End: 2017-09-13 | Stop reason: SDUPTHER

## 2017-07-14 RX ORDER — CYCLOBENZAPRINE HCL 10 MG
10 TABLET ORAL 2 TIMES DAILY PRN
Qty: 60 TABLET | Refills: 2 | Status: SHIPPED | OUTPATIENT
Start: 2017-07-14 | End: 2017-09-20 | Stop reason: SDUPTHER

## 2017-07-20 ENCOUNTER — HOSPITAL ENCOUNTER (OUTPATIENT)
Dept: INTERVENTIONAL RADIOLOGY/VASCULAR | Facility: HOSPITAL | Age: 52
Discharge: HOME | End: 2017-07-20
Attending: PAIN MEDICINE | Admitting: PAIN MEDICINE

## 2017-07-20 VITALS
HEART RATE: 85 BPM | RESPIRATION RATE: 20 BRPM | SYSTOLIC BLOOD PRESSURE: 119 MMHG | OXYGEN SATURATION: 97 % | DIASTOLIC BLOOD PRESSURE: 88 MMHG

## 2017-07-20 DIAGNOSIS — M46.1 SACROILIITIS (HCC): ICD-10-CM

## 2017-07-20 PROCEDURE — 27096 INJECT SACROILIAC JOINT: CPT | Performed by: PAIN MEDICINE

## 2017-07-20 PROCEDURE — 0 IOPAMIDOL 41 % SOLUTION: Performed by: PAIN MEDICINE

## 2017-07-20 PROCEDURE — 25010000002 METHYLPREDNISOLONE PER 40 MG: Performed by: PAIN MEDICINE

## 2017-07-20 RX ORDER — BUPIVACAINE HYDROCHLORIDE 5 MG/ML
INJECTION, SOLUTION EPIDURAL; INTRACAUDAL
Status: COMPLETED | OUTPATIENT
Start: 2017-07-20 | End: 2017-07-20

## 2017-07-20 RX ORDER — LIDOCAINE HYDROCHLORIDE 10 MG/ML
INJECTION, SOLUTION EPIDURAL; INFILTRATION; INTRACAUDAL; PERINEURAL
Status: COMPLETED | OUTPATIENT
Start: 2017-07-20 | End: 2017-07-20

## 2017-07-20 RX ORDER — METHYLPREDNISOLONE ACETATE 40 MG/ML
INJECTION, SUSPENSION INTRA-ARTICULAR; INTRALESIONAL; INTRAMUSCULAR; SOFT TISSUE
Status: COMPLETED | OUTPATIENT
Start: 2017-07-20 | End: 2017-07-20

## 2017-07-20 RX ADMIN — IOPAMIDOL 0.5 ML: 408 INJECTION, SOLUTION INTRATHECAL at 13:37

## 2017-07-20 RX ADMIN — LIDOCAINE HYDROCHLORIDE 1 ML: 10 INJECTION, SOLUTION EPIDURAL; INFILTRATION; INTRACAUDAL; PERINEURAL at 13:37

## 2017-07-20 RX ADMIN — METHYLPREDNISOLONE ACETATE 40 MG: 40 INJECTION, SUSPENSION INTRA-ARTICULAR; INTRALESIONAL; INTRAMUSCULAR; SOFT TISSUE at 13:39

## 2017-07-20 RX ADMIN — BUPIVACAINE HYDROCHLORIDE 0.5 ML: 5 INJECTION, SOLUTION EPIDURAL; INTRACAUDAL; PERINEURAL at 13:39

## 2017-08-14 DIAGNOSIS — G89.29 CHRONIC BILATERAL LOW BACK PAIN WITHOUT SCIATICA: ICD-10-CM

## 2017-08-14 DIAGNOSIS — M54.50 CHRONIC BILATERAL LOW BACK PAIN WITHOUT SCIATICA: ICD-10-CM

## 2017-08-14 RX ORDER — GABAPENTIN 400 MG/1
400 CAPSULE ORAL 3 TIMES DAILY
Qty: 90 CAPSULE | Refills: 2 | Status: SHIPPED | OUTPATIENT
Start: 2017-08-14 | End: 2017-09-20 | Stop reason: DRUGHIGH

## 2017-08-14 RX ORDER — CELECOXIB 200 MG/1
200 CAPSULE ORAL 2 TIMES DAILY
Qty: 60 CAPSULE | Refills: 2 | Status: SHIPPED | OUTPATIENT
Start: 2017-08-14 | End: 2017-11-29 | Stop reason: SDUPTHER

## 2017-09-13 RX ORDER — SIMVASTATIN 5 MG
TABLET ORAL
Qty: 90 TABLET | Refills: 0 | Status: SHIPPED | OUTPATIENT
Start: 2017-09-13 | End: 2018-01-24 | Stop reason: SDUPTHER

## 2017-09-20 ENCOUNTER — TELEPHONE (OUTPATIENT)
Dept: FAMILY MEDICINE CLINIC | Facility: CLINIC | Age: 52
End: 2017-09-20

## 2017-09-20 ENCOUNTER — OFFICE VISIT (OUTPATIENT)
Dept: FAMILY MEDICINE CLINIC | Facility: CLINIC | Age: 52
End: 2017-09-20

## 2017-09-20 ENCOUNTER — APPOINTMENT (OUTPATIENT)
Dept: LAB | Facility: HOSPITAL | Age: 52
End: 2017-09-20

## 2017-09-20 VITALS
WEIGHT: 171 LBS | HEIGHT: 70 IN | SYSTOLIC BLOOD PRESSURE: 118 MMHG | BODY MASS INDEX: 24.48 KG/M2 | DIASTOLIC BLOOD PRESSURE: 82 MMHG

## 2017-09-20 DIAGNOSIS — M54.50 CHRONIC BILATERAL LOW BACK PAIN WITHOUT SCIATICA: Primary | ICD-10-CM

## 2017-09-20 DIAGNOSIS — Z79.899 HIGH RISK MEDICATION USE: ICD-10-CM

## 2017-09-20 DIAGNOSIS — G89.29 CHRONIC BILATERAL LOW BACK PAIN WITHOUT SCIATICA: Primary | ICD-10-CM

## 2017-09-20 DIAGNOSIS — Z23 FLU VACCINE NEED: ICD-10-CM

## 2017-09-20 DIAGNOSIS — F41.1 GENERALIZED ANXIETY DISORDER: ICD-10-CM

## 2017-09-20 DIAGNOSIS — E78.00 PURE HYPERCHOLESTEROLEMIA: ICD-10-CM

## 2017-09-20 LAB
ALBUMIN SERPL-MCNC: 4.2 G/DL (ref 3.4–4.8)
ALBUMIN/GLOB SERPL: 1.7 G/DL (ref 1.1–1.8)
ALP SERPL-CCNC: 77 U/L (ref 38–126)
ALT SERPL W P-5'-P-CCNC: 32 U/L (ref 21–72)
ANION GAP SERPL CALCULATED.3IONS-SCNC: 9 MMOL/L (ref 5–15)
ARTICHOKE IGE QN: 124 MG/DL (ref 1–129)
AST SERPL-CCNC: 32 U/L (ref 17–59)
BILIRUB SERPL-MCNC: 0.6 MG/DL (ref 0.2–1.3)
BUN BLD-MCNC: 14 MG/DL (ref 7–21)
BUN/CREAT SERPL: 17.1 (ref 7–25)
CALCIUM SPEC-SCNC: 9.2 MG/DL (ref 8.4–10.2)
CHLORIDE SERPL-SCNC: 104 MMOL/L (ref 95–110)
CHOLEST SERPL-MCNC: 187 MG/DL (ref 0–199)
CO2 SERPL-SCNC: 28 MMOL/L (ref 22–31)
CREAT BLD-MCNC: 0.82 MG/DL (ref 0.7–1.3)
GFR SERPL CREATININE-BSD FRML MDRD: 99 ML/MIN/1.73 (ref 56–130)
GLOBULIN UR ELPH-MCNC: 2.5 GM/DL (ref 2.3–3.5)
GLUCOSE BLD-MCNC: 84 MG/DL (ref 60–100)
HDLC SERPL-MCNC: 48 MG/DL (ref 60–200)
LDLC/HDLC SERPL: 2.18 {RATIO} (ref 0–3.55)
POTASSIUM BLD-SCNC: 4.2 MMOL/L (ref 3.5–5.1)
PROT SERPL-MCNC: 6.7 G/DL (ref 6.3–8.6)
SODIUM BLD-SCNC: 141 MMOL/L (ref 137–145)
TRIGL SERPL-MCNC: 171 MG/DL (ref 20–199)

## 2017-09-20 PROCEDURE — 99214 OFFICE O/P EST MOD 30 MIN: CPT | Performed by: FAMILY MEDICINE

## 2017-09-20 PROCEDURE — 36415 COLL VENOUS BLD VENIPUNCTURE: CPT | Performed by: FAMILY MEDICINE

## 2017-09-20 PROCEDURE — G0481 DRUG TEST DEF 8-14 CLASSES: HCPCS | Performed by: FAMILY MEDICINE

## 2017-09-20 PROCEDURE — 80061 LIPID PANEL: CPT | Performed by: FAMILY MEDICINE

## 2017-09-20 PROCEDURE — 80307 DRUG TEST PRSMV CHEM ANLYZR: CPT | Performed by: FAMILY MEDICINE

## 2017-09-20 PROCEDURE — 90686 IIV4 VACC NO PRSV 0.5 ML IM: CPT | Performed by: FAMILY MEDICINE

## 2017-09-20 PROCEDURE — 80053 COMPREHEN METABOLIC PANEL: CPT | Performed by: FAMILY MEDICINE

## 2017-09-20 PROCEDURE — 90471 IMMUNIZATION ADMIN: CPT | Performed by: FAMILY MEDICINE

## 2017-09-20 RX ORDER — GABAPENTIN 600 MG/1
600 TABLET ORAL 3 TIMES DAILY
Qty: 90 TABLET | Refills: 2 | Status: SHIPPED | OUTPATIENT
Start: 2017-09-20 | End: 2018-01-02 | Stop reason: SDUPTHER

## 2017-09-20 RX ORDER — CYCLOBENZAPRINE HCL 10 MG
10 TABLET ORAL 3 TIMES DAILY PRN
Qty: 90 TABLET | Refills: 2 | Status: SHIPPED | OUTPATIENT
Start: 2017-09-20 | End: 2018-03-26 | Stop reason: SDUPTHER

## 2017-09-20 NOTE — PROGRESS NOTES
Subjective   Tre Nielsen is a 51 y.o. male.     Back Pain   This is a chronic problem. The current episode started more than 1 year ago. The problem occurs daily. The problem has been gradually worsening since onset. The pain is present in the lumbar spine. The quality of the pain is described as aching and burning. The pain does not radiate. The pain is at a severity of 8/10. The pain is moderate. The pain is the same all the time. Stiffness is present all day. Associated symptoms include leg pain, tingling and weakness. Pertinent negatives include no abdominal pain, bladder incontinence, bowel incontinence, chest pain, dysuria, fever, headaches, numbness, paresis, paresthesias, pelvic pain, perianal numbness or weight loss. Risk factors include sedentary lifestyle. Treatments tried: gabapentin. The treatment provided mild relief.   Hyperlipidemia   This is a chronic problem. The current episode started more than 1 year ago. The problem is controlled. Recent lipid tests were reviewed and are normal. There are no known factors aggravating his hyperlipidemia. Associated symptoms include leg pain. Pertinent negatives include no chest pain, focal sensory loss, focal weakness, myalgias or shortness of breath. Current antihyperlipidemic treatment includes statins. The current treatment provides moderate improvement of lipids. There are no compliance problems.  Risk factors for coronary artery disease include stress, a sedentary lifestyle, male sex and obesity.   Anxiety   Presents for follow-up visit. Symptoms include nervous/anxious behavior. Patient reports no chest pain, compulsions, confusion, decreased concentration, depressed mood, dizziness, dry mouth, excessive worry, feeling of choking, hyperventilation, impotence, insomnia, irritability, malaise, muscle tension, nausea, obsessions, palpitations, panic, restlessness, shortness of breath or suicidal ideas. Symptoms occur constantly. The severity of symptoms  "is moderate. The quality of sleep is fair. Nighttime awakenings: occasional.     Compliance with medications is %.        Current Outpatient Prescriptions:   •  betamethasone valerate (VALISONE) 0.1 % ointment, Apply  topically 2 (Two) Times a Day., Disp: 45 g, Rfl: 1  •  celecoxib (CELEBREX) 200 MG capsule, Take 1 capsule by mouth 2 (Two) Times a Day., Disp: 60 capsule, Rfl: 2  •  cyclobenzaprine (FLEXERIL) 10 MG tablet, Take 1 tablet by mouth 2 (Two) Times a Day As Needed for Muscle Spasms., Disp: 60 tablet, Rfl: 2  •  gabapentin (NEURONTIN) 400 MG capsule, Take 1 capsule by mouth 3 (Three) Times a Day., Disp: 90 capsule, Rfl: 2  •  sertraline (ZOLOFT) 50 MG tablet, Take 1 tablet by mouth Daily., Disp: 30 tablet, Rfl: 2  •  simvastatin (ZOCOR) 5 MG tablet, TAKE 1 TABLET BY MOUTH EVERY NIGHT, Disp: 90 tablet, Rfl: 0    The following portions of the patient's history were reviewed and updated as appropriate: allergies, current medications, past family history, past medical history, past social history, past surgical history and problem list.    Review of Systems   Constitutional: Negative for fever, irritability and weight loss.   Respiratory: Negative for shortness of breath.    Cardiovascular: Negative for chest pain and palpitations.   Gastrointestinal: Negative for abdominal pain, bowel incontinence and nausea.   Genitourinary: Negative for bladder incontinence, dysuria, impotence and pelvic pain.   Musculoskeletal: Positive for back pain. Negative for myalgias.   Neurological: Positive for tingling and weakness. Negative for dizziness, focal weakness, numbness, headaches and paresthesias.   Psychiatric/Behavioral: Negative for confusion, decreased concentration and suicidal ideas. The patient is nervous/anxious. The patient does not have insomnia.        Objective    Vitals:    09/20/17 1035   BP: 118/82   Weight: 171 lb (77.6 kg)   Height: 70\" (177.8 cm)       Physical Exam   Constitutional: He is " oriented to person, place, and time. He appears well-developed and well-nourished. He appears distressed.   Appears uncomfortable.   Cardiovascular: Normal rate, regular rhythm and normal heart sounds.    No murmur heard.  No LE edema.   Pulmonary/Chest: Effort normal and breath sounds normal. No respiratory distress.   Abdominal: Soft. Bowel sounds are normal. He exhibits no distension. There is no tenderness.   Musculoskeletal:        Lumbar back: He exhibits tenderness, pain and spasm. He exhibits normal range of motion and no deformity.   Neurological: He is alert and oriented to person, place, and time.   Psychiatric: He has a normal mood and affect. His behavior is normal. Judgment and thought content normal.   Nursing note and vitals reviewed.      Assessment/Plan   Problems Addressed this Visit        Cardiovascular and Mediastinum    Pure hypercholesterolemia    Relevant Orders    Lipid Panel (Completed)       Nervous and Auditory    Low back pain - Primary       Other    Generalized anxiety disorder      Other Visit Diagnoses     Flu vaccine need        Relevant Orders    Flu Vaccine Quad PF 3YR+ (FLURIX/FLUZONE 2803-0848) (Completed)    High risk medication use        Relevant Orders    Comprehensive Metabolic Panel (Completed)    ToxASSURE Select 13 (MW)        1.) Chronic low back pain-  Will call and get back in for another epidural injection.  Had helped him a lot.  Will continue celebrex and also increase his gabapentin.  UDS today. The patient has read and signed the [facility] Controlled Substance Contract.  I will continue to see patient for regular follow up appointments.  They are well controlled on their medication.  BRITTANY has been reviewed by me and is updated every 3 months. The patient is aware of the potential for addiction and dependence.  2.) CHENG-  He has been doing well. Will continue current medications.  3.) Hyperlipidemia- Will recheck annual lipids. Adjust statin if needed.  Checking CMP as well.  4.) Flu vaccine given today.  RTC in 3 months or sooner PRN

## 2017-09-20 NOTE — PROGRESS NOTES
Pr Dr. DENIS Dykes, Mr. Nielsen has been called with his recent lab results & recommendations.  Continue his current medications and follow-up as planned or sooner if any problems.

## 2017-09-20 NOTE — TELEPHONE ENCOUNTER
Pr Dr. DENIS Dykes, Mr. Nielsen has been called with his recent lab results & recommendations.  Continue his current medications and follow-up as planned or sooner if any problems.      ----- Message from Erika Dykes MD sent at 9/20/2017  2:24 PM CDT -----  Please let him know that his cholesterol looks good. HDL is a little low.  Can increase that with exercise or OTC fish oil/omega three.  Follow-up as planned or sooner PRN.

## 2017-09-27 LAB — CONV REPORT SUMMARY: NORMAL

## 2017-10-26 DIAGNOSIS — F41.1 GENERALIZED ANXIETY DISORDER: ICD-10-CM

## 2017-10-27 ENCOUNTER — OFFICE VISIT (OUTPATIENT)
Dept: PAIN MEDICINE | Facility: CLINIC | Age: 52
End: 2017-10-27

## 2017-10-27 VITALS
DIASTOLIC BLOOD PRESSURE: 70 MMHG | WEIGHT: 168.5 LBS | HEIGHT: 70 IN | SYSTOLIC BLOOD PRESSURE: 120 MMHG | BODY MASS INDEX: 24.12 KG/M2

## 2017-10-27 DIAGNOSIS — M51.36 DDD (DEGENERATIVE DISC DISEASE), LUMBAR: Primary | ICD-10-CM

## 2017-10-27 DIAGNOSIS — M54.16 LUMBAR RADICULOPATHY: ICD-10-CM

## 2017-10-27 PROCEDURE — 99213 OFFICE O/P EST LOW 20 MIN: CPT | Performed by: PAIN MEDICINE

## 2017-10-27 NOTE — PROGRESS NOTES
Tre Nielsen is a 52 y.o. male.   1965    HPI:   Location: lower back and bilateral hip  Quality: aching, sharp and dull  Severity: 4/10  Timing: constant  Alleviating: pain medication  Aggravating: increased activity       Having back and left leg pain.  States ROMAN has helped the most in the past and would like to have this repeated.  States this procedure helped him significantly and he was more active.  Last performed 5/2017.      The following portions of the patient's history were reviewed by me and updated as appropriate: allergies, current medications, past family history, past medical history, past social history, past surgical history and problem list.    Past Medical History:   Diagnosis Date   • Candidiasis of skin and nails 02/03/2016   • Chronic pain disorder    • Depression    • Dyslipidemia 07/28/2015   • Joint pain 02/03/2016    RIGHT SHOULDER   • Low back pain 05/10/2016   • Muscle weakness 12/07/2015   • Pure hypercholesterolemia 07/28/2015   • Tobacco dependence syndrome 06/09/2015       Social History     Social History   • Marital status:      Spouse name: N/A   • Number of children: N/A   • Years of education: N/A     Occupational History   • Not on file.     Social History Main Topics   • Smoking status: Current Every Day Smoker   • Smokeless tobacco: Never Used   • Alcohol use Yes   • Drug use: Yes     Special: Marijuana   • Sexual activity: Defer     Other Topics Concern   • Not on file     Social History Narrative       Family History   Problem Relation Age of Onset   • Diabetes Mother    • Migraines Sister    • Diabetes Maternal Grandmother          Current Outpatient Prescriptions:   •  betamethasone valerate (VALISONE) 0.1 % ointment, Apply  topically 2 (Two) Times a Day., Disp: 45 g, Rfl: 1  •  celecoxib (CELEBREX) 200 MG capsule, Take 1 capsule by mouth 2 (Two) Times a Day., Disp: 60 capsule, Rfl: 2  •  cyclobenzaprine (FLEXERIL) 10 MG tablet, Take 1 tablet by mouth 3  (Three) Times a Day As Needed for Muscle Spasms., Disp: 90 tablet, Rfl: 2  •  gabapentin (NEURONTIN) 600 MG tablet, Take 1 tablet by mouth 3 (Three) Times a Day., Disp: 90 tablet, Rfl: 2  •  sertraline (ZOLOFT) 50 MG tablet, TAKE 1 TABLET BY MOUTH DAILY, Disp: 30 tablet, Rfl: 0  •  simvastatin (ZOCOR) 5 MG tablet, TAKE 1 TABLET BY MOUTH EVERY NIGHT, Disp: 90 tablet, Rfl: 0    No Known Allergies    Review of Systems   Musculoskeletal: Positive for back pain.        B.hip pain       All other systems reviewed and are negative.    All systems reviewed and negative except for above.    Physical Exam   Constitutional: He appears well-developed and well-nourished. No distress.   Musculoskeletal:   Pos left woodrow and compression tests.  ttp over left si joint.    Neurological: He is alert.   Reflex Scores:       Patellar reflexes are 2+ on the right side and 2+ on the left side.       Achilles reflexes are 2+ on the right side and 2+ on the left side.  Mod left slr    Normal strength in b lower ext.    Psychiatric: He has a normal mood and affect. His behavior is normal. Judgment normal.       Tre was seen today for back pain and pain.    Diagnoses and all orders for this visit:    DDD (degenerative disc disease), lumbar  -     IR epidural block injection lumbar spine; Future    Lumbar radiculopathy  -     IR epidural block injection lumbar spine; Future        Medication: None at this time    Interventional:  I have discussed the risks and benefits of the procedure with the patient.    Repeat LESI as this has provided relief in past and allowed increased activity.  tfesi and si injections have helped, however LESI provided most relief according to him.  Reviewed mri from last year.  Pt has had no major changes since then.     Rehab: none at this time    Behavioral: No aberrant behavior noted. BRITTANY Report # [id number] was reviewed and is consistent with stated history    Urine drug screen None at this  time          This document has been electronically signed by Len Royal MD on October 27, 2017 11:12 AM

## 2017-11-06 ENCOUNTER — HOSPITAL ENCOUNTER (OUTPATIENT)
Dept: INTERVENTIONAL RADIOLOGY/VASCULAR | Facility: HOSPITAL | Age: 52
Discharge: HOME | End: 2017-11-06
Attending: PAIN MEDICINE | Admitting: PAIN MEDICINE

## 2017-11-06 VITALS
HEART RATE: 98 BPM | OXYGEN SATURATION: 97 % | SYSTOLIC BLOOD PRESSURE: 153 MMHG | RESPIRATION RATE: 18 BRPM | DIASTOLIC BLOOD PRESSURE: 97 MMHG

## 2017-11-06 DIAGNOSIS — M54.16 LUMBAR RADICULOPATHY: ICD-10-CM

## 2017-11-06 DIAGNOSIS — M51.36 DDD (DEGENERATIVE DISC DISEASE), LUMBAR: ICD-10-CM

## 2017-11-06 PROCEDURE — 62323 NJX INTERLAMINAR LMBR/SAC: CPT | Performed by: PAIN MEDICINE

## 2017-11-06 PROCEDURE — 25010000002 METHYLPREDNISOLONE PER 80 MG: Performed by: PAIN MEDICINE

## 2017-11-06 PROCEDURE — 0 IOPAMIDOL 41 % SOLUTION: Performed by: PAIN MEDICINE

## 2017-11-06 RX ORDER — METHYLPREDNISOLONE ACETATE 80 MG/ML
INJECTION, SUSPENSION INTRA-ARTICULAR; INTRALESIONAL; INTRAMUSCULAR; SOFT TISSUE
Status: COMPLETED | OUTPATIENT
Start: 2017-11-06 | End: 2017-11-06

## 2017-11-06 RX ORDER — LIDOCAINE HYDROCHLORIDE 20 MG/ML
INJECTION, SOLUTION INFILTRATION; PERINEURAL
Status: COMPLETED | OUTPATIENT
Start: 2017-11-06 | End: 2017-11-06

## 2017-11-06 RX ADMIN — IOPAMIDOL 2 ML: 408 INJECTION, SOLUTION INTRATHECAL at 13:16

## 2017-11-06 RX ADMIN — LIDOCAINE HYDROCHLORIDE 2 ML: 20 INJECTION, SOLUTION INFILTRATION; PERINEURAL at 13:15

## 2017-11-06 RX ADMIN — METHYLPREDNISOLONE ACETATE 80 MG: 80 INJECTION, SUSPENSION INTRA-ARTICULAR; INTRALESIONAL; INTRAMUSCULAR; SOFT TISSUE at 13:15

## 2017-11-29 DIAGNOSIS — G89.29 CHRONIC BILATERAL LOW BACK PAIN WITHOUT SCIATICA: ICD-10-CM

## 2017-11-29 DIAGNOSIS — M54.50 CHRONIC BILATERAL LOW BACK PAIN WITHOUT SCIATICA: ICD-10-CM

## 2017-11-29 DIAGNOSIS — F41.1 GENERALIZED ANXIETY DISORDER: ICD-10-CM

## 2017-11-29 RX ORDER — CELECOXIB 200 MG/1
CAPSULE ORAL
Qty: 60 CAPSULE | Refills: 0 | Status: SHIPPED | OUTPATIENT
Start: 2017-11-29 | End: 2018-01-24 | Stop reason: SDUPTHER

## 2018-01-04 DIAGNOSIS — G89.29 CHRONIC BILATERAL LOW BACK PAIN WITHOUT SCIATICA: ICD-10-CM

## 2018-01-04 DIAGNOSIS — F41.1 GENERALIZED ANXIETY DISORDER: ICD-10-CM

## 2018-01-04 DIAGNOSIS — M54.50 CHRONIC BILATERAL LOW BACK PAIN WITHOUT SCIATICA: ICD-10-CM

## 2018-01-04 RX ORDER — CELECOXIB 200 MG/1
CAPSULE ORAL
Qty: 60 CAPSULE | Refills: 0 | OUTPATIENT
Start: 2018-01-04

## 2018-01-04 RX ORDER — GABAPENTIN 600 MG/1
TABLET ORAL
Qty: 90 TABLET | Refills: 0 | OUTPATIENT
Start: 2018-01-04

## 2018-01-24 DIAGNOSIS — M54.50 CHRONIC BILATERAL LOW BACK PAIN WITHOUT SCIATICA: ICD-10-CM

## 2018-01-24 DIAGNOSIS — G89.29 CHRONIC BILATERAL LOW BACK PAIN WITHOUT SCIATICA: ICD-10-CM

## 2018-01-24 DIAGNOSIS — F41.1 GENERALIZED ANXIETY DISORDER: ICD-10-CM

## 2018-01-24 RX ORDER — CELECOXIB 200 MG/1
CAPSULE ORAL
Qty: 60 CAPSULE | Refills: 0 | OUTPATIENT
Start: 2018-01-24

## 2018-01-24 RX ORDER — SIMVASTATIN 5 MG
TABLET ORAL
Qty: 90 TABLET | Refills: 0 | OUTPATIENT
Start: 2018-01-24

## 2018-01-24 RX ORDER — CELECOXIB 200 MG/1
200 CAPSULE ORAL
Qty: 60 CAPSULE | Refills: 0 | Status: SHIPPED | OUTPATIENT
Start: 2018-01-24 | End: 2018-02-28 | Stop reason: SDUPTHER

## 2018-01-24 RX ORDER — SIMVASTATIN 5 MG
5 TABLET ORAL NIGHTLY
Qty: 90 TABLET | Refills: 0 | Status: SHIPPED | OUTPATIENT
Start: 2018-01-24 | End: 2018-05-27 | Stop reason: SDUPTHER

## 2018-01-24 RX ORDER — GABAPENTIN 600 MG/1
TABLET ORAL
Qty: 90 TABLET | Refills: 0 | OUTPATIENT
Start: 2018-01-24

## 2018-02-28 DIAGNOSIS — G89.29 CHRONIC BILATERAL LOW BACK PAIN WITHOUT SCIATICA: ICD-10-CM

## 2018-02-28 DIAGNOSIS — F41.1 GENERALIZED ANXIETY DISORDER: ICD-10-CM

## 2018-02-28 DIAGNOSIS — M54.50 CHRONIC BILATERAL LOW BACK PAIN WITHOUT SCIATICA: ICD-10-CM

## 2018-02-28 RX ORDER — CELECOXIB 200 MG/1
CAPSULE ORAL
Qty: 60 CAPSULE | Refills: 0 | Status: SHIPPED | OUTPATIENT
Start: 2018-02-28 | End: 2018-04-05 | Stop reason: SDUPTHER

## 2018-03-26 ENCOUNTER — OFFICE VISIT (OUTPATIENT)
Dept: FAMILY MEDICINE CLINIC | Facility: CLINIC | Age: 53
End: 2018-03-26

## 2018-03-26 VITALS
HEIGHT: 70 IN | SYSTOLIC BLOOD PRESSURE: 160 MMHG | BODY MASS INDEX: 23.68 KG/M2 | WEIGHT: 165.4 LBS | DIASTOLIC BLOOD PRESSURE: 92 MMHG

## 2018-03-26 DIAGNOSIS — M54.50 CHRONIC BILATERAL LOW BACK PAIN WITHOUT SCIATICA: Primary | ICD-10-CM

## 2018-03-26 DIAGNOSIS — M70.62 TROCHANTERIC BURSITIS OF LEFT HIP: ICD-10-CM

## 2018-03-26 DIAGNOSIS — F41.1 GENERALIZED ANXIETY DISORDER: ICD-10-CM

## 2018-03-26 DIAGNOSIS — G89.29 CHRONIC BILATERAL LOW BACK PAIN WITHOUT SCIATICA: Primary | ICD-10-CM

## 2018-03-26 PROBLEM — M54.16 LEFT LUMBAR RADICULOPATHY: Status: ACTIVE | Noted: 2017-05-11

## 2018-03-26 PROCEDURE — 20610 DRAIN/INJ JOINT/BURSA W/O US: CPT | Performed by: FAMILY MEDICINE

## 2018-03-26 PROCEDURE — 99214 OFFICE O/P EST MOD 30 MIN: CPT | Performed by: FAMILY MEDICINE

## 2018-03-26 RX ORDER — GABAPENTIN 600 MG/1
600 TABLET ORAL 3 TIMES DAILY
Qty: 90 TABLET | Refills: 2 | Status: SHIPPED | OUTPATIENT
Start: 2018-03-26 | End: 2018-06-19 | Stop reason: SDUPTHER

## 2018-03-26 RX ORDER — CYCLOBENZAPRINE HCL 10 MG
10 TABLET ORAL 3 TIMES DAILY PRN
Qty: 90 TABLET | Refills: 2 | Status: SHIPPED | OUTPATIENT
Start: 2018-03-26 | End: 2018-06-27 | Stop reason: SDUPTHER

## 2018-03-26 RX ORDER — TRIAMCINOLONE ACETONIDE 40 MG/ML
80 INJECTION, SUSPENSION INTRA-ARTICULAR; INTRAMUSCULAR ONCE
Status: COMPLETED | OUTPATIENT
Start: 2018-03-26 | End: 2018-03-26

## 2018-03-26 RX ORDER — PAROXETINE 10 MG/1
10 TABLET, FILM COATED ORAL EVERY MORNING
Qty: 30 TABLET | Refills: 1 | Status: SHIPPED | OUTPATIENT
Start: 2018-03-26 | End: 2018-05-07 | Stop reason: DRUGHIGH

## 2018-03-26 RX ADMIN — TRIAMCINOLONE ACETONIDE 80 MG: 40 INJECTION, SUSPENSION INTRA-ARTICULAR; INTRAMUSCULAR at 09:38

## 2018-03-26 NOTE — PROGRESS NOTES
Subjective   Tre Nielsen is a 52 y.o. male.     History of Present Illness   Mr. Nielsen is a 51yo male that presents today for follow-up on chronic back pain and issues with hip pain. He had not been seen in awhile. He had been on gabapentin and has been out of that for months now. He says that he had injections in his back from Dr. Royal and those worked for about two weeks but didn't give long-term results.  He has been taking NSAIDS at home for pain and celebrex that I had written for him.  He has been having more issues with his left hip. He says that has been getting worse.  Pain in his back is worse in his low back.  He has been limited in his activity.   He has been taking sertraline for anxiety and he has been taking only half because if he takes a whole one he gets wired.  He is concerned to try that again.  He has had more issues with anxiety and that has been limiting him getting out.        Current Outpatient Prescriptions:   •  betamethasone valerate (VALISONE) 0.1 % ointment, APPLY EXTERNALLY TO THE AFFECTED AREA TWICE DAILY, Disp: 45 g, Rfl: 0  •  celecoxib (CeleBREX) 200 MG capsule, TAKE 1 CAPSULE BY MOUTH TWICE DAILY, Disp: 60 capsule, Rfl: 0  •  cyclobenzaprine (FLEXERIL) 10 MG tablet, Take 1 tablet by mouth 3 (Three) Times a Day As Needed for Muscle Spasms., Disp: 90 tablet, Rfl: 2  •  sertraline (ZOLOFT) 50 MG tablet, TAKE 1 TABLET BY MOUTH DAILY, Disp: 30 tablet, Rfl: 0  •  simvastatin (ZOCOR) 5 MG tablet, Take 1 tablet by mouth Every Night., Disp: 90 tablet, Rfl: 0    The following portions of the patient's history were reviewed and updated as appropriate: allergies, current medications, past family history, past medical history, past social history, past surgical history and problem list.    Review of Systems   Constitutional: Positive for activity change and fatigue. Negative for appetite change, fever, unexpected weight gain and unexpected weight loss.   Respiratory: Negative for  "chest tightness, shortness of breath and wheezing.    Cardiovascular: Positive for palpitations. Negative for chest pain and leg swelling.   Gastrointestinal: Negative for abdominal distention, abdominal pain, constipation, diarrhea, nausea and vomiting.   Musculoskeletal: Positive for arthralgias, back pain, gait problem, myalgias, neck pain and neck stiffness.   Psychiatric/Behavioral: Positive for agitation, decreased concentration, depressed mood and stress. Negative for dysphoric mood, hallucinations and self-injury.       Objective    Vitals:    03/26/18 0900   BP: 160/92   Weight: 75 kg (165 lb 6.4 oz)   Height: 177.8 cm (70\")       Physical Exam   Constitutional: He appears well-developed and well-nourished. No distress.   Appears uncomfortable   Cardiovascular: Normal rate and regular rhythm.    Murmur heard.  Pulmonary/Chest: Effort normal and breath sounds normal. No respiratory distress. He has no wheezes.   Abdominal: Soft. Bowel sounds are normal. He exhibits no distension. There is no tenderness.   Musculoskeletal:        Left hip: He exhibits tenderness. He exhibits normal strength, no bony tenderness, no swelling and no deformity.   Psychiatric: His behavior is normal. Judgment and thought content normal.   Psychomotor agitation   Nursing note and vitals reviewed.    PROCEDURE/BURSA INJECTION:  Discussed risks and benefits associated with injection.  After verbal consent was obtained, area was prepped in sterile fashion.  Alcohol used to prep the ** bursa.  Injected with 23 gauge needle from posterior lateral approach.  No complications or concernes.  Injected 2cc kenalog and 1cc lidocaine.      Assessment/Plan   Problems Addressed this Visit        Nervous and Auditory    Low back pain - Primary    Relevant Orders    Ambulatory Referral to Pain Management (Completed)    Ambulatory Referral to Orthopedic Surgery       Other    Generalized anxiety disorder    Relevant Medications    PARoxetine " (PAXIL) 10 MG tablet      Other Visit Diagnoses     Trochanteric bursitis of left hip        Relevant Medications    triamcinolone acetonide (KENALOG-40) injection 80 mg (Completed)    Other Relevant Orders    Ambulatory Referral to Orthopedic Surgery        1.) Chronic back pain- Will restart gabapentin. I think that this is the cause of his back pain. UDS at next appointment.  The patient has read and signed the [facility] Controlled Substance Contract.  I will continue to see patient for regular follow up appointments.  They are well controlled on their medication.  BRITTANY has been reviewed by me and is updated every 3 months. The patient is aware of the potential for addiction and dependence.  Will get him back in with Dr. Royal, Pain Management to try injections again.    2.) Bursitis-  Injected today without complciations.  3.) CHENG-  Will change sertraline since he can't take the medication that he had been taking. Will change sertraline to paxil because he didn't tolerate the medications at lowest dose.    RTC in 2-3 months or sooner PRN

## 2018-04-05 DIAGNOSIS — M54.50 CHRONIC BILATERAL LOW BACK PAIN WITHOUT SCIATICA: ICD-10-CM

## 2018-04-05 DIAGNOSIS — G89.29 CHRONIC BILATERAL LOW BACK PAIN WITHOUT SCIATICA: ICD-10-CM

## 2018-04-05 RX ORDER — CELECOXIB 200 MG/1
CAPSULE ORAL
Qty: 60 CAPSULE | Refills: 0 | Status: SHIPPED | OUTPATIENT
Start: 2018-04-05 | End: 2018-04-26 | Stop reason: SDUPTHER

## 2018-04-19 ENCOUNTER — OFFICE VISIT (OUTPATIENT)
Dept: ORTHOPEDIC SURGERY | Facility: CLINIC | Age: 53
End: 2018-04-19

## 2018-04-19 VITALS — OXYGEN SATURATION: 98 % | HEIGHT: 70 IN | BODY MASS INDEX: 24.05 KG/M2 | HEART RATE: 107 BPM | WEIGHT: 168 LBS

## 2018-04-19 DIAGNOSIS — M25.551 BILATERAL HIP PAIN: Primary | ICD-10-CM

## 2018-04-19 DIAGNOSIS — M25.552 BILATERAL HIP PAIN: Primary | ICD-10-CM

## 2018-04-19 PROCEDURE — 99214 OFFICE O/P EST MOD 30 MIN: CPT | Performed by: ORTHOPAEDIC SURGERY

## 2018-04-19 NOTE — PROGRESS NOTES
Tre Nielsen is a 52 y.o. male   Primary provider:  Erika Dykes MD       Chief Complaint   Patient presents with   • Right Hip - Pain   • Left Hip - Pain       HISTORY OF PRESENT ILLNESS: Patient is here today for bilateral hip pain. Patient states that this pain is moderate and has been ongoing for10 years. Patient states that his left hip pain is 7/10 today. He states that his right hip pain is 3/10 today. He feels popping in his groin when he manipulates his hip.  This started out when trying to fix his back and his back is bothering him well and he sees pain management for this.  He will follow along time as a  and this seemed to exacerbate his pain he no longer does this.  It hurts him on a daily basis and has catching noted in that hip.  No specific injury in the past it radiates down the thigh it is not associated with any numbness or weakness he has not been able to find anything to relieve his pain at this point.  He has had several steroid shots in the back to try to help.    History of Present Illness     CONCURRENT MEDICAL HISTORY:    Past Medical History:   Diagnosis Date   • Candidiasis of skin and nails 02/03/2016   • Chronic pain disorder    • Depression    • Dyslipidemia 07/28/2015   • Joint pain 02/03/2016    RIGHT SHOULDER   • Low back pain 05/10/2016   • Muscle weakness 12/07/2015   • Pure hypercholesterolemia 07/28/2015   • Tobacco dependence syndrome 06/09/2015       No Known Allergies      Current Outpatient Prescriptions:   •  betamethasone valerate (VALISONE) 0.1 % ointment, APPLY EXTERNALLY TO THE AFFECTED AREA TWICE DAILY, Disp: 45 g, Rfl: 0  •  celecoxib (CeleBREX) 200 MG capsule, TAKE 1 CAPSULE BY MOUTH TWICE DAILY, Disp: 60 capsule, Rfl: 0  •  cyclobenzaprine (FLEXERIL) 10 MG tablet, Take 1 tablet by mouth 3 (Three) Times a Day As Needed for Muscle Spasms., Disp: 90 tablet, Rfl: 2  •  gabapentin (NEURONTIN) 600 MG tablet, Take 1 tablet by mouth 3  "(Three) Times a Day., Disp: 90 tablet, Rfl: 2  •  PARoxetine (PAXIL) 10 MG tablet, Take 1 tablet by mouth Every Morning., Disp: 30 tablet, Rfl: 1  •  simvastatin (ZOCOR) 5 MG tablet, Take 1 tablet by mouth Every Night., Disp: 90 tablet, Rfl: 0    No past surgical history on file.    Family History   Problem Relation Age of Onset   • Diabetes Mother    • Migraines Sister    • Diabetes Maternal Grandmother        Social History     Social History   • Marital status:      Spouse name: N/A   • Number of children: N/A   • Years of education: N/A     Occupational History   • Not on file.     Social History Main Topics   • Smoking status: Current Every Day Smoker   • Smokeless tobacco: Never Used   • Alcohol use Yes   • Drug use:      Types: Marijuana   • Sexual activity: Defer     Other Topics Concern   • Not on file     Social History Narrative   • No narrative on file        Review of Systems   Constitutional: Negative for chills and fever.   HENT: Negative for facial swelling.    Eyes: Negative for photophobia.   Respiratory: Negative for apnea and shortness of breath.    Cardiovascular: Negative for chest pain and leg swelling.   Gastrointestinal: Negative for abdominal pain, nausea and vomiting.   Genitourinary: Negative for dysuria.   Musculoskeletal: Positive for arthralgias, back pain, gait problem and myalgias.   Skin: Negative for color change and rash.   Neurological: Negative for seizures and syncope.   Psychiatric/Behavioral: Negative for behavioral problems and dysphoric mood.       PHYSICAL EXAMINATION:       Pulse 107   Ht 177.8 cm (70\")   Wt 76.2 kg (168 lb)   SpO2 98%   BMI 24.11 kg/m²     Physical Exam   Constitutional: He is oriented to person, place, and time. He appears well-developed and well-nourished.   HENT:   Head: Normocephalic and atraumatic.   Eyes: EOM are normal. Pupils are equal, round, and reactive to light.   Neck: Neck supple.   Pulmonary/Chest: Effort normal. "   Musculoskeletal: Normal range of motion. He exhibits no edema or deformity.   Neurological: He is alert and oriented to person, place, and time.   Skin: Skin is warm and dry. No erythema.   Psychiatric: He has a normal mood and affect. Thought content normal.   Vitals reviewed.      GAIT:     [x]  Normal  []  Antalgic    Assistive device: [x]  None  []  Walker     []  Crutches  []  Cane     []  Wheelchair  []  Stretcher    Ortho Exam decreased internal rotation of the left compared to the right he does have palpable crepitus when extending it tender the sciatic notch left greater than right straight raising is negative he is able to toe walk.  Positive impingement sign of the hip.  Capillary refill is intact.  Good motion of the knee.  Mild pain with hip resisted flexion.  He is neurovascularly intact otherwise distally.  Figure 4 test is not acutely painful.      No results found.  Previous x-rays are reviewed from 9 months ago that show pretty good maintenance of joint space.  The bony pelvis appears normal.      ASSESSMENT:    Diagnoses and all orders for this visit:    Bilateral hip pain  -     MRI Hip Left Arthrogram; Future          PLAN possibility of labral pathology.  Some of this may be coming from his back.  He certainly does have groin pain with loss of motion.  I'm going to send him for MRI arthrogram on the left hip see him back after this to develop further treatment plan at that point.  We consider intra-articular injection to try to sort this out.    No Follow-up on file.    Lloyd Tyson MD

## 2018-04-26 DIAGNOSIS — M54.50 CHRONIC BILATERAL LOW BACK PAIN WITHOUT SCIATICA: ICD-10-CM

## 2018-04-26 DIAGNOSIS — G89.29 CHRONIC BILATERAL LOW BACK PAIN WITHOUT SCIATICA: ICD-10-CM

## 2018-04-26 RX ORDER — CELECOXIB 200 MG/1
CAPSULE ORAL
Qty: 60 CAPSULE | Refills: 0 | Status: SHIPPED | OUTPATIENT
Start: 2018-04-26 | End: 2018-05-27 | Stop reason: SDUPTHER

## 2018-05-07 ENCOUNTER — OFFICE VISIT (OUTPATIENT)
Dept: FAMILY MEDICINE CLINIC | Facility: CLINIC | Age: 53
End: 2018-05-07

## 2018-05-07 VITALS
WEIGHT: 169.2 LBS | DIASTOLIC BLOOD PRESSURE: 86 MMHG | BODY MASS INDEX: 24.22 KG/M2 | SYSTOLIC BLOOD PRESSURE: 120 MMHG | HEIGHT: 70 IN

## 2018-05-07 DIAGNOSIS — Z79.899 HIGH RISK MEDICATION USE: ICD-10-CM

## 2018-05-07 DIAGNOSIS — F41.1 GENERALIZED ANXIETY DISORDER: Primary | ICD-10-CM

## 2018-05-07 DIAGNOSIS — M54.16 LEFT LUMBAR RADICULOPATHY: ICD-10-CM

## 2018-05-07 PROCEDURE — 99213 OFFICE O/P EST LOW 20 MIN: CPT | Performed by: FAMILY MEDICINE

## 2018-05-07 RX ORDER — PAROXETINE HYDROCHLORIDE 20 MG/1
20 TABLET, FILM COATED ORAL EVERY MORNING
Qty: 30 TABLET | Refills: 3 | Status: SHIPPED | OUTPATIENT
Start: 2018-05-07 | End: 2018-06-19 | Stop reason: DRUGHIGH

## 2018-05-07 NOTE — PROGRESS NOTES
Subjective   Tre Nielsen is a 52 y.o. male.     Anxiety   Presents for follow-up visit. Symptoms include excessive worry, nervous/anxious behavior and restlessness. Patient reports no chest pain, compulsions, confusion, decreased concentration, depressed mood, dizziness, dry mouth, feeling of choking, hyperventilation, impotence, insomnia, irritability, malaise, muscle tension, nausea, obsessions, palpitations, panic, shortness of breath or suicidal ideas. Symptoms occur constantly. The severity of symptoms is moderate. The quality of sleep is fair. Nighttime awakenings: occasional.     Compliance with medications is %.     He has had a lot of issues with chronic pain and has been seeing ortho. He has been back on his gabapentin and that has been helping him.  He has MRI of the hips ordered.   He has had issues with sciatica and the gabapentin has helped more then anything else that he has been given.  He has been taking TID now and that has been helping.        Current Outpatient Prescriptions:   •  betamethasone valerate (VALISONE) 0.1 % ointment, APPLY EXTERNALLY TO THE AFFECTED AREA TWICE DAILY, Disp: 45 g, Rfl: 0  •  celecoxib (CeleBREX) 200 MG capsule, TAKE 1 CAPSULE BY MOUTH TWICE DAILY, Disp: 60 capsule, Rfl: 0  •  cyclobenzaprine (FLEXERIL) 10 MG tablet, Take 1 tablet by mouth 3 (Three) Times a Day As Needed for Muscle Spasms., Disp: 90 tablet, Rfl: 2  •  gabapentin (NEURONTIN) 600 MG tablet, Take 1 tablet by mouth 3 (Three) Times a Day., Disp: 90 tablet, Rfl: 2  •  PARoxetine (PAXIL) 10 MG tablet, Take 1 tablet by mouth Every Morning., Disp: 30 tablet, Rfl: 1  •  simvastatin (ZOCOR) 5 MG tablet, Take 1 tablet by mouth Every Night., Disp: 90 tablet, Rfl: 0    The following portions of the patient's history were reviewed and updated as appropriate: allergies, current medications, past family history, past medical history, past social history, past surgical history and problem list.    Review of  "Systems   Constitutional: Negative for activity change, appetite change, fatigue, fever, irritability and unexpected weight change.   Respiratory: Negative for shortness of breath.    Cardiovascular: Negative for chest pain and palpitations.   Gastrointestinal: Negative for abdominal distention, abdominal pain, constipation, diarrhea, nausea and vomiting.   Genitourinary: Negative for dysuria and impotence.   Musculoskeletal: Positive for arthralgias, back pain and gait problem. Negative for myalgias.   Neurological: Positive for weakness. Negative for dizziness, numbness and headaches.   Psychiatric/Behavioral: Negative for confusion, decreased concentration, sleep disturbance and suicidal ideas. The patient is nervous/anxious. The patient does not have insomnia.        Objective    Vitals:    05/07/18 1502   BP: 120/86   Weight: 76.7 kg (169 lb 3.2 oz)   Height: 177.8 cm (70\")       Physical Exam   Constitutional: He is oriented to person, place, and time. He appears well-developed and well-nourished. No distress.   Cardiovascular: Normal rate, regular rhythm and normal heart sounds.    No murmur heard.  No LE edema.   Pulmonary/Chest: Effort normal and breath sounds normal. No respiratory distress.   Abdominal: Soft. Bowel sounds are normal. He exhibits no distension. There is no tenderness.   Neurological: He is alert and oriented to person, place, and time.   Psychiatric: He has a normal mood and affect. His behavior is normal. Judgment and thought content normal.   Nursing note and vitals reviewed.      Assessment/Plan   Problems Addressed this Visit        Nervous and Auditory    Left lumbar radiculopathy       Other    Generalized anxiety disorder - Primary    Relevant Medications    PARoxetine (PAXIL) 20 MG tablet      Other Visit Diagnoses     High risk medication use            He feels that his anxiety medication is better tolerated then the sertraline. Will increase to 20MG and see if tolerated. If not " will back down to 10 again.  Follow-up with ortho as planned.  Needs to have UDS at next appointment for gabapentin.  Will refill at that appointment because he didn't need it today.  RTC in 1.5 months or sooner PRN

## 2018-05-16 DIAGNOSIS — M47.816 SPONDYLOSIS OF LUMBAR REGION WITHOUT MYELOPATHY OR RADICULOPATHY: ICD-10-CM

## 2018-05-16 DIAGNOSIS — M25.552 BILATERAL HIP PAIN: Primary | ICD-10-CM

## 2018-05-16 DIAGNOSIS — M54.50 CHRONIC BILATERAL LOW BACK PAIN WITHOUT SCIATICA: ICD-10-CM

## 2018-05-16 DIAGNOSIS — M25.551 BILATERAL HIP PAIN: Primary | ICD-10-CM

## 2018-05-16 DIAGNOSIS — G89.29 CHRONIC BILATERAL LOW BACK PAIN WITHOUT SCIATICA: ICD-10-CM

## 2018-05-24 ENCOUNTER — HOSPITAL ENCOUNTER (OUTPATIENT)
Dept: MRI IMAGING | Facility: HOSPITAL | Age: 53
Discharge: HOME | End: 2018-05-24
Admitting: ORTHOPAEDIC SURGERY

## 2018-05-24 ENCOUNTER — HOSPITAL ENCOUNTER (OUTPATIENT)
Dept: CT IMAGING | Facility: HOSPITAL | Age: 53
Discharge: HOME | End: 2018-05-24

## 2018-05-24 DIAGNOSIS — G89.29 CHRONIC BILATERAL LOW BACK PAIN WITHOUT SCIATICA: ICD-10-CM

## 2018-05-24 DIAGNOSIS — M25.551 BILATERAL HIP PAIN: ICD-10-CM

## 2018-05-24 DIAGNOSIS — M47.816 SPONDYLOSIS OF LUMBAR REGION WITHOUT MYELOPATHY OR RADICULOPATHY: ICD-10-CM

## 2018-05-24 DIAGNOSIS — M54.50 CHRONIC BILATERAL LOW BACK PAIN WITHOUT SCIATICA: ICD-10-CM

## 2018-05-24 DIAGNOSIS — M25.552 BILATERAL HIP PAIN: ICD-10-CM

## 2018-05-24 PROCEDURE — 77012 CT SCAN FOR NEEDLE BIOPSY: CPT

## 2018-05-24 PROCEDURE — 25010000002 IOPAMIDOL 61 % SOLUTION: Performed by: ORTHOPAEDIC SURGERY

## 2018-05-24 PROCEDURE — 73722 MRI JOINT OF LWR EXTR W/DYE: CPT

## 2018-05-24 RX ADMIN — IOPAMIDOL 5 ML: 612 INJECTION, SOLUTION INTRAVENOUS at 14:30

## 2018-05-24 NOTE — PRE-PROCEDURE NOTE
Patient for CT directed left hip arthrogram.Procedure, risks , and benefits discussed with patient and patient  gave informed consent.  H&P dated 5/7/2018 reviewed with no changes.

## 2018-05-27 DIAGNOSIS — G89.29 CHRONIC BILATERAL LOW BACK PAIN WITHOUT SCIATICA: ICD-10-CM

## 2018-05-27 DIAGNOSIS — M54.50 CHRONIC BILATERAL LOW BACK PAIN WITHOUT SCIATICA: ICD-10-CM

## 2018-05-29 RX ORDER — CELECOXIB 200 MG/1
CAPSULE ORAL
Qty: 60 CAPSULE | Refills: 0 | Status: SHIPPED | OUTPATIENT
Start: 2018-05-29 | End: 2018-06-27 | Stop reason: SDUPTHER

## 2018-05-29 RX ORDER — SIMVASTATIN 5 MG
5 TABLET ORAL NIGHTLY
Qty: 90 TABLET | Refills: 0 | Status: SHIPPED | OUTPATIENT
Start: 2018-05-29 | End: 2018-09-04 | Stop reason: SDUPTHER

## 2018-06-19 ENCOUNTER — OFFICE VISIT (OUTPATIENT)
Dept: FAMILY MEDICINE CLINIC | Facility: CLINIC | Age: 53
End: 2018-06-19

## 2018-06-19 VITALS
BODY MASS INDEX: 23.96 KG/M2 | WEIGHT: 167.4 LBS | HEIGHT: 70 IN | DIASTOLIC BLOOD PRESSURE: 80 MMHG | SYSTOLIC BLOOD PRESSURE: 118 MMHG

## 2018-06-19 DIAGNOSIS — F41.1 GENERALIZED ANXIETY DISORDER: Primary | ICD-10-CM

## 2018-06-19 DIAGNOSIS — Z12.11 COLON CANCER SCREENING: ICD-10-CM

## 2018-06-19 PROCEDURE — 99213 OFFICE O/P EST LOW 20 MIN: CPT | Performed by: FAMILY MEDICINE

## 2018-06-19 RX ORDER — PAROXETINE 30 MG/1
30 TABLET, FILM COATED ORAL EVERY MORNING
Qty: 30 TABLET | Refills: 3 | Status: SHIPPED | OUTPATIENT
Start: 2018-06-19 | End: 2018-11-30

## 2018-06-19 RX ORDER — GABAPENTIN 600 MG/1
600 TABLET ORAL 3 TIMES DAILY
Qty: 90 TABLET | Refills: 2 | Status: SHIPPED | OUTPATIENT
Start: 2018-06-19 | End: 2018-10-08 | Stop reason: SDUPTHER

## 2018-06-19 NOTE — PROGRESS NOTES
Subjective   Tre Nielsen is a 52 y.o. male.     Anxiety   Presents for follow-up visit. Symptoms include decreased concentration, depressed mood, excessive worry, irritability, malaise, nervous/anxious behavior and restlessness. Patient reports no chest pain, compulsions, confusion, dizziness, dry mouth, feeling of choking, hyperventilation, impotence, insomnia, muscle tension, nausea, obsessions, palpitations, panic, shortness of breath or suicidal ideas. Symptoms occur occasionally. The severity of symptoms is moderate, causing significant distress and interfering with daily activities. The quality of sleep is fair. Nighttime awakenings: occasional.     Compliance with medications is %.   He has been seeing a counselor and that has helped him a little.         Current Outpatient Prescriptions:   •  betamethasone valerate (VALISONE) 0.1 % ointment, APPLY EXTERNALLY TO THE AFFECTED AREA TWICE DAILY, Disp: 45 g, Rfl: 0  •  celecoxib (CeleBREX) 200 MG capsule, TAKE 1 CAPSULE BY MOUTH TWICE DAILY, Disp: 60 capsule, Rfl: 0  •  cyclobenzaprine (FLEXERIL) 10 MG tablet, Take 1 tablet by mouth 3 (Three) Times a Day As Needed for Muscle Spasms., Disp: 90 tablet, Rfl: 2  •  gabapentin (NEURONTIN) 600 MG tablet, Take 1 tablet by mouth 3 (Three) Times a Day., Disp: 90 tablet, Rfl: 2  •  PARoxetine (PAXIL) 20 MG tablet, Take 1 tablet by mouth Every Morning., Disp: 30 tablet, Rfl: 3  •  simvastatin (ZOCOR) 5 MG tablet, TAKE 1 TABLET BY MOUTH EVERY NIGHT, Disp: 90 tablet, Rfl: 0    The following portions of the patient's history were reviewed and updated as appropriate: allergies, current medications, past family history, past medical history, past social history, past surgical history and problem list.    Review of Systems   Constitutional: Positive for activity change, fatigue and irritability. Negative for appetite change, fever and unexpected weight change.   Respiratory: Negative for shortness of breath.   "  Cardiovascular: Negative for chest pain and palpitations.   Gastrointestinal: Negative for abdominal distention, abdominal pain, constipation, diarrhea, nausea and vomiting.   Genitourinary: Negative for dysuria and impotence.   Musculoskeletal: Positive for arthralgias, back pain and gait problem. Negative for myalgias.   Neurological: Positive for weakness. Negative for dizziness, numbness and headaches.   Psychiatric/Behavioral: Positive for decreased concentration. Negative for confusion, sleep disturbance and suicidal ideas. The patient is nervous/anxious. The patient does not have insomnia.        Objective    Vitals:    06/19/18 0827   BP: 118/80   Weight: 75.9 kg (167 lb 6.4 oz)   Height: 177.8 cm (70\")       Physical Exam   Constitutional: He is oriented to person, place, and time. He appears well-developed and well-nourished. No distress.   Cardiovascular: Normal rate, regular rhythm and normal heart sounds.    No murmur heard.  No LE edema.   Pulmonary/Chest: Effort normal and breath sounds normal. No respiratory distress.   Abdominal: Soft. Bowel sounds are normal. He exhibits no distension. There is no tenderness.   Neurological: He is alert and oriented to person, place, and time.   Psychiatric: His behavior is normal. Judgment and thought content normal.   Psychomotor agitation. Seems very on edge.   Nursing note and vitals reviewed.      Assessment/Plan   Problems Addressed this Visit        Other    Generalized anxiety disorder - Primary    Relevant Medications    PARoxetine (PAXIL) 30 MG tablet      Other Visit Diagnoses     Colon cancer screening        Relevant Orders    Occult Blood, Fecal By Immunoassay - Stool, Per Rectum          He seems more on edge. He has had issues with pain and he thinks that is making that was worse.  Will increase his paxil.  He has seen counselor.  Has another appointment coming up.  Will call and get him back in with ortho to follow-up on MRI.  Refilled " gabapentin.  Ordered FIT test for colon cancer screening. He has refused colonoscopy in the past.  RTC in 1.5 months or sooner PRN

## 2018-06-21 ENCOUNTER — OFFICE VISIT (OUTPATIENT)
Dept: ORTHOPEDIC SURGERY | Facility: CLINIC | Age: 53
End: 2018-06-21

## 2018-06-21 VITALS — HEIGHT: 70 IN | WEIGHT: 167 LBS | BODY MASS INDEX: 23.91 KG/M2

## 2018-06-21 DIAGNOSIS — M95.8 OSTEOCHONDRAL DEFECT: ICD-10-CM

## 2018-06-21 DIAGNOSIS — M54.16 LEFT LUMBAR RADICULOPATHY: ICD-10-CM

## 2018-06-21 DIAGNOSIS — M47.26 OTHER SPONDYLOSIS WITH RADICULOPATHY, LUMBAR REGION: ICD-10-CM

## 2018-06-21 DIAGNOSIS — M25.552 LEFT HIP PAIN: Primary | ICD-10-CM

## 2018-06-21 PROCEDURE — 99214 OFFICE O/P EST MOD 30 MIN: CPT | Performed by: ORTHOPAEDIC SURGERY

## 2018-06-21 NOTE — PROGRESS NOTES
Tre Nielsen is a 52 y.o. male returns for     Chief Complaint   Patient presents with   • Left Hip - Follow-up   • Right Hip - Follow-up       HISTORY OF PRESENT ILLNESS: Patient is here today for follow up of bilateral hip pain. Patient received a CT guided injection- LT hip on 5/16/2018/. Patient denies any changes to his bilateral hip since his last office visit. The shot he had a lasted for 5 days.  He is having severe pain noted the back of the hip on the left side but also in the groin on the left side.  The right side bothers him somewhat.  He has a history of having epidurals in the past for his back pain by Dr. Dykes spent a while he had a study of his back a year and a half ago which are reviewed that showed retrolisthesis and facet hypertrophy.  He feels that this is getting worse pain in the back but also pain anteriorly in the groin.  No neurologic symptoms distally.       CONCURRENT MEDICAL HISTORY:    Past Medical History:   Diagnosis Date   • Candidiasis of skin and nails 02/03/2016   • Chronic pain disorder    • Depression    • Dyslipidemia 07/28/2015   • Joint pain 02/03/2016    RIGHT SHOULDER   • Low back pain 05/10/2016   • Muscle weakness 12/07/2015   • Pure hypercholesterolemia 07/28/2015   • Tobacco dependence syndrome 06/09/2015       No Known Allergies      Current Outpatient Prescriptions:   •  betamethasone valerate (VALISONE) 0.1 % ointment, APPLY EXTERNALLY TO THE AFFECTED AREA TWICE DAILY, Disp: 45 g, Rfl: 0  •  celecoxib (CeleBREX) 200 MG capsule, TAKE 1 CAPSULE BY MOUTH TWICE DAILY, Disp: 60 capsule, Rfl: 0  •  cyclobenzaprine (FLEXERIL) 10 MG tablet, Take 1 tablet by mouth 3 (Three) Times a Day As Needed for Muscle Spasms., Disp: 90 tablet, Rfl: 2  •  gabapentin (NEURONTIN) 600 MG tablet, Take 1 tablet by mouth 3 (Three) Times a Day., Disp: 90 tablet, Rfl: 2  •  PARoxetine (PAXIL) 30 MG tablet, Take 1 tablet by mouth Every Morning., Disp: 30 tablet, Rfl: 3  •  simvastatin  "(ZOCOR) 5 MG tablet, TAKE 1 TABLET BY MOUTH EVERY NIGHT, Disp: 90 tablet, Rfl: 0    History reviewed. No pertinent surgical history.    ROS  No fevers or chills.  No chest pain or shortness of air.  No GI or  disturbances.    PHYSICAL EXAMINATION:       Ht 177.8 cm (70\")   Wt 75.8 kg (167 lb)   BMI 23.96 kg/m²     Physical Exam   Constitutional: He is oriented to person, place, and time. He appears well-developed and well-nourished.   HENT:   Head: Normocephalic and atraumatic.   Eyes: EOM are normal. Pupils are equal, round, and reactive to light.   Neck: Neck supple. No tracheal deviation present.   Pulmonary/Chest: Effort normal.   Musculoskeletal: He exhibits tenderness. He exhibits no edema or deformity.   Neurological: He is alert and oriented to person, place, and time.   Skin: Skin is warm and dry. No erythema.   Psychiatric: He has a normal mood and affect. Thought content normal.   Vitals reviewed.      GAIT:     []  Normal  [x]  Antalgic    Assistive device: [x]  None  []  Walker     []  Crutches  []  Cane     []  Wheelchair  []  Stretcher    Ortho Exam   Decreased internal rotation of both hips reproduces pain much worse on the left impingement of the hip is also significantly positive.  Motor and sensory exam are grossly intact.  He's tender over the sciatic notch on the left side straight raising is negative a lot of pain with extension of his back and lateral bending.    Ct Guided Contrast Injection Joint    Result Date: 5/24/2018  Narrative: Procedure: CT-guided left hip MR gadolinium contrast injection Reason for exam: Left hip pain. FINDINGS: Patient presents for CT-guided left hip MR contrast injection. Procedure, risks, benefits were explained to the patient he gave informed written consent. Axial computer tomography sequential imaging was performed of the left hip without contrast. This exam was performed according to our departmental dose optimization program, which includes automated " exposure control, adjustment of the mA and/or KV according to patient size and/or use of iterative reconstruction technique. Appropriate access point was identified and the skin surface was marked. The patient was sterilely prepped and draped and locally anesthetized lidocaine. Utilizing CT guidance a 25-gauge spinal needle was placed into the left hip joint space. A total of 15 mL of dilute 0.1 mL of gadolinium mixed with 15 mL of sterile normal saline and 5 mL of Isovue 300 was administered into the left hip joint space. Subsequent imaging reveals good MR left hip joint injection. The needle was then removed. Patient tolerated procedure well. No immediate complications.     Impression: Successful CT directed left hip MR gadolinium contrast injection. Electronically signed by:  Alec Heath MD  5/24/2018 3:16 PM CDT Workstation: [id number]    Mri Hip Left Arthrogram    Result Date: 5/24/2018  Narrative: Procedure: MR left hip post gadolinium CT arthrogram left hip injection Reason for exam: Left hip pain FINDINGS: Multisequence multiplanar MR imaging of the left hip was performed post gadolinium CT arthrogram left hip injection. Contrast is seen within the left hip joint space. Superior anterior labral oblique linear focus of abnormal contrast extension into the labrum. Associated osteochondral defect with contrast extending through this defect involving the superior lateral left acetabulum consistent with the osteochondral defect in this region. Otherwise bony and soft tissue structures in the left hip region are unremarkable. No evidence of muscular or tendinous strain or tear injury. Remainder of MR left hip exam unremarkable.     Impression: 1.  Left hip superior anterior labrum oblique tear with associated superior lateral left acetabular osteochondral defect. 2.  Remainder of MR left hip arthrogram unremarkable. Electronically signed by:  Alec Heath MD  5/24/2018 3:47 PM CDT Workstation:  [id number]            ASSESSMENT:    Diagnoses and all orders for this visit:    Left hip pain    Osteochondral defect    Left lumbar radiculopathy  -     MRI Lumbar Spine Without Contrast; Future    Other spondylosis with radiculopathy, lumbar region          PLAN at this point periarthritis injection he wants to get his hip replaced.  I explained to avoid don't think the only problem he has is from his hip at the problem with his back this is contributing to his pain.  Of note is that his limb length discrepancy the left hip is at least a centimeter longer than the right with apparent leg lengths.  He was to have surgery on his hip.  Explained the problem with the back and is much worse than it was a year and half ago I'm getting a scan of his back is to evaluate this prior to scheduling any surgery of his hip so at least delineate this.  I discussed explained this to them we'll get the ball rolling in his hip done first looking into his MRI of his back back.    No Follow-up on file.    Lloyd Tyson MD

## 2018-06-25 DIAGNOSIS — M54.50 CHRONIC BILATERAL LOW BACK PAIN WITHOUT SCIATICA: ICD-10-CM

## 2018-06-25 DIAGNOSIS — G89.29 CHRONIC BILATERAL LOW BACK PAIN WITHOUT SCIATICA: ICD-10-CM

## 2018-06-25 RX ORDER — GABAPENTIN 600 MG/1
TABLET ORAL
Qty: 90 TABLET | Refills: 0 | OUTPATIENT
Start: 2018-06-25

## 2018-06-25 RX ORDER — CYCLOBENZAPRINE HCL 10 MG
TABLET ORAL
Qty: 90 TABLET | Refills: 0 | OUTPATIENT
Start: 2018-06-25

## 2018-06-25 RX ORDER — SIMVASTATIN 5 MG
5 TABLET ORAL NIGHTLY
Qty: 90 TABLET | Refills: 0 | OUTPATIENT
Start: 2018-06-25

## 2018-06-25 RX ORDER — CELECOXIB 200 MG/1
CAPSULE ORAL
Qty: 60 CAPSULE | Refills: 0 | OUTPATIENT
Start: 2018-06-25

## 2018-06-27 ENCOUNTER — HOSPITAL ENCOUNTER (OUTPATIENT)
Dept: MRI IMAGING | Facility: HOSPITAL | Age: 53
Discharge: HOME | End: 2018-06-27
Attending: ORTHOPAEDIC SURGERY | Admitting: ORTHOPAEDIC SURGERY

## 2018-06-27 ENCOUNTER — TELEPHONE (OUTPATIENT)
Dept: FAMILY MEDICINE CLINIC | Facility: CLINIC | Age: 53
End: 2018-06-27

## 2018-06-27 DIAGNOSIS — M54.16 LEFT LUMBAR RADICULOPATHY: ICD-10-CM

## 2018-06-27 DIAGNOSIS — G89.29 CHRONIC BILATERAL LOW BACK PAIN WITHOUT SCIATICA: ICD-10-CM

## 2018-06-27 DIAGNOSIS — M54.50 CHRONIC BILATERAL LOW BACK PAIN WITHOUT SCIATICA: ICD-10-CM

## 2018-06-27 PROCEDURE — 72148 MRI LUMBAR SPINE W/O DYE: CPT

## 2018-06-27 RX ORDER — CYCLOBENZAPRINE HCL 10 MG
10 TABLET ORAL 3 TIMES DAILY PRN
Qty: 90 TABLET | Refills: 1 | Status: SHIPPED | OUTPATIENT
Start: 2018-06-27 | End: 2018-09-04 | Stop reason: SDUPTHER

## 2018-06-27 RX ORDER — CELECOXIB 200 MG/1
200 CAPSULE ORAL 2 TIMES DAILY
Qty: 60 CAPSULE | Refills: 1 | Status: SHIPPED | OUTPATIENT
Start: 2018-06-27 | End: 2018-09-04 | Stop reason: SDUPTHER

## 2018-07-10 ENCOUNTER — TELEPHONE (OUTPATIENT)
Dept: FAMILY MEDICINE CLINIC | Facility: CLINIC | Age: 53
End: 2018-07-10

## 2018-07-10 ENCOUNTER — APPOINTMENT (OUTPATIENT)
Dept: LAB | Facility: HOSPITAL | Age: 53
End: 2018-07-10

## 2018-07-10 LAB — HEMOCCULT STL QL IA: NEGATIVE

## 2018-07-10 PROCEDURE — 82274 ASSAY TEST FOR BLOOD FECAL: CPT | Performed by: FAMILY MEDICINE

## 2018-07-10 NOTE — TELEPHONE ENCOUNTER
Pr Dr. DENIS Dykes, Mr. Nielsen has been called with his recent lab results & recommendations.  Continue his current medications and follow-up as planned or sooner if any problems.      ----- Message from Erika Dykes MD sent at 7/10/2018 12:25 PM CDT -----  Please let him know that his colon cancer screening was negative. Will need to repeat that annually

## 2018-07-16 ENCOUNTER — OFFICE VISIT (OUTPATIENT)
Dept: ORTHOPEDIC SURGERY | Facility: CLINIC | Age: 53
End: 2018-07-16

## 2018-07-16 VITALS — WEIGHT: 169 LBS | BODY MASS INDEX: 24.2 KG/M2 | HEIGHT: 70 IN

## 2018-07-16 DIAGNOSIS — M16.12 PRIMARY OSTEOARTHRITIS OF LEFT HIP: ICD-10-CM

## 2018-07-16 DIAGNOSIS — M47.816 SPONDYLOSIS OF LUMBAR REGION WITHOUT MYELOPATHY OR RADICULOPATHY: ICD-10-CM

## 2018-07-16 DIAGNOSIS — M54.16 LEFT LUMBAR RADICULOPATHY: Primary | ICD-10-CM

## 2018-07-16 DIAGNOSIS — M54.50 CHRONIC BILATERAL LOW BACK PAIN WITHOUT SCIATICA: ICD-10-CM

## 2018-07-16 DIAGNOSIS — M47.26 OTHER SPONDYLOSIS WITH RADICULOPATHY, LUMBAR REGION: ICD-10-CM

## 2018-07-16 DIAGNOSIS — G89.29 CHRONIC BILATERAL LOW BACK PAIN WITHOUT SCIATICA: ICD-10-CM

## 2018-07-16 PROCEDURE — 99214 OFFICE O/P EST MOD 30 MIN: CPT | Performed by: ORTHOPAEDIC SURGERY

## 2018-07-16 NOTE — PROGRESS NOTES
"Tre Nielsen is a 52 y.o. male returns for     Chief Complaint   Patient presents with   • Results     MRI Lumbar spine       HISTORY OF PRESENT ILLNESS: Patient is here today for MRI results of the lumbar spine. Patient states that his pain today is 6/10.He did have MRI scan of his back his back is hurting as well as his hip.       CONCURRENT MEDICAL HISTORY:    Past Medical History:   Diagnosis Date   • Candidiasis of skin and nails 02/03/2016   • Chronic pain disorder    • Depression    • Dyslipidemia 07/28/2015   • Joint pain 02/03/2016    RIGHT SHOULDER   • Low back pain 05/10/2016   • Muscle weakness 12/07/2015   • Pure hypercholesterolemia 07/28/2015   • Tobacco dependence syndrome 06/09/2015       No Known Allergies      Current Outpatient Prescriptions:   •  betamethasone valerate (VALISONE) 0.1 % ointment, APPLY EXTERNALLY TO THE AFFECTED AREA TWICE DAILY, Disp: 45 g, Rfl: 0  •  celecoxib (CeleBREX) 200 MG capsule, Take 1 capsule by mouth 2 (Two) Times a Day., Disp: 60 capsule, Rfl: 1  •  cyclobenzaprine (FLEXERIL) 10 MG tablet, Take 1 tablet by mouth 3 (Three) Times a Day As Needed for Muscle Spasms., Disp: 90 tablet, Rfl: 1  •  gabapentin (NEURONTIN) 600 MG tablet, Take 1 tablet by mouth 3 (Three) Times a Day., Disp: 90 tablet, Rfl: 2  •  PARoxetine (PAXIL) 30 MG tablet, Take 1 tablet by mouth Every Morning., Disp: 30 tablet, Rfl: 3  •  simvastatin (ZOCOR) 5 MG tablet, TAKE 1 TABLET BY MOUTH EVERY NIGHT, Disp: 90 tablet, Rfl: 0    No past surgical history on file.    ROS  No fevers or chills.  No chest pain or shortness of air.  No GI or  disturbances.    PHYSICAL EXAMINATION:       Ht 177.8 cm (70\")   Wt 76.7 kg (169 lb)   BMI 24.25 kg/m²     Physical Exam   Constitutional: He is oriented to person, place, and time. He appears well-developed and well-nourished.   HENT:   Head: Normocephalic and atraumatic.   Eyes: EOM are normal. Pupils are equal, round, and reactive to light.   Neck: Neck " supple. No tracheal deviation present.   Pulmonary/Chest: Effort normal.   Musculoskeletal: He exhibits tenderness. He exhibits no edema or deformity.   Neurological: He is alert and oriented to person, place, and time.   Skin: Skin is warm and dry. No erythema.   Psychiatric: He has a normal mood and affect. Thought content normal.   Vitals reviewed.      GAIT:     [x]  Normal  []  Antalgic    Assistive device: [x]  None  []  Walker     []  Crutches  []  Cane     []  Wheelchair  []  Stretcher    Ortho Exam  He has pain with flexion internal rotation of his left hip as before.  He is tender over the posterior sciatic notch on the left.  He has pain with extension of his back.  Appears grossly neurologically intact.    Mri Lumbar Spine Without Contrast    Result Date: 6/27/2018  Narrative: EXAM DESCRIPTION: MRI LUMBAR SPINE WO CONTRAST CLINICAL HISTORY: 52-year-old male with chronic lower back pain and left sided radiculopathy. COMPARISON: 12/2/2016 FINDINGS:  Sagittal and axial T1 and T2 MR images of lumbar spine are submitted for interpretation . No incidental acute or suspicious findings within the included paraspinal soft tissues. Redemonstration of 3-4 mm degenerative anterolisthesis at L4-5 and retrolisthesis at L5-S1. Otherwise there is anatomic alignment of the lumbar and included lower thoracic vertebra. No compression fracture or suspicious marrow replacement/lesion identified. The conus terminates at the L1 level and demonstrates normal signal and morphology. There is loss of T2 disc signal and disc space height at L5-S1 greater than L4-5. T12-L1:  No significant disc bulge, protrusion, or stenosis. L1-L2:  Minimal bulge without protrusion or stenosis. L2-L3:  No significant disc bulge, protrusion, or stenosis. L3-L4: No significant disc bulge, protrusion, or stenosis. Facet arthropathy. L4-L5: Degenerative anterolisthesis with uncovering of the posterior disc margin. There is disc bulge without focal  protrusion. There is facet arthropathy and thickening of the ligamentum flavum. Effacement of the thecal sac without significant central spinal canal stenosis. There is mild right side foraminal stenosis. There is an extraforaminal protrusion on the right contacting the extraforaminal right L4 nerve root. L5-S1: Degenerative retrolisthesis. There is a broad-based disc bulge without a focal protrusion identified. Minimal flattening of the ventral thecal sac without central spinal canal stenosis. There is mild facet arthropathy. There is mild right and moderate left foraminal stenosis.     Impression: Degenerative disc disease and facet arthropathy seen greatest at L4/5 and L5/S1 as detailed above. Electronically signed by:  Robin Brewer MD  6/27/2018 10:32 PM CDT Workstation: [id number]    I reviewed his scan as above and agree with the findings.        ASSESSMENT:    Diagnoses and all orders for this visit:    Left lumbar radiculopathy  -     Ambulatory Referral to Orthopedic Surgery    Chronic bilateral low back pain without sciatica  -     Ambulatory Referral to Orthopedic Surgery    Other spondylosis with radiculopathy, lumbar region  -     Ambulatory Referral to Orthopedic Surgery    Spondylosis of lumbar region without myelopathy or radiculopathy  -     Ambulatory Referral to Orthopedic Surgery    Primary osteoarthritis of left hip          PLAN clearly I believe he has 2 problems.  He feels that the back problem hurts her more than the hip problem.  Relates to see with the spine guys to make this referral.  He is requested to go somewhere else other than here.  I will go ahead and arrange that for him.  I told immediately take the MRI scan with him.  I will see him back at any point he needs with regard to his hip.    No Follow-up on file.    Lloyd Tyson MD

## 2018-07-31 ENCOUNTER — OFFICE VISIT (OUTPATIENT)
Dept: FAMILY MEDICINE CLINIC | Facility: CLINIC | Age: 53
End: 2018-07-31

## 2018-07-31 ENCOUNTER — APPOINTMENT (OUTPATIENT)
Dept: LAB | Facility: HOSPITAL | Age: 53
End: 2018-07-31

## 2018-07-31 VITALS
BODY MASS INDEX: 23.62 KG/M2 | DIASTOLIC BLOOD PRESSURE: 80 MMHG | SYSTOLIC BLOOD PRESSURE: 122 MMHG | HEIGHT: 70 IN | WEIGHT: 165 LBS

## 2018-07-31 DIAGNOSIS — Z76.89 ENCOUNTER TO ESTABLISH CARE: ICD-10-CM

## 2018-07-31 DIAGNOSIS — E78.00 PURE HYPERCHOLESTEROLEMIA: ICD-10-CM

## 2018-07-31 DIAGNOSIS — M54.50 CHRONIC BILATERAL LOW BACK PAIN WITHOUT SCIATICA: ICD-10-CM

## 2018-07-31 DIAGNOSIS — R21 RASH OF HANDS: Primary | ICD-10-CM

## 2018-07-31 DIAGNOSIS — G89.29 CHRONIC BILATERAL LOW BACK PAIN WITHOUT SCIATICA: ICD-10-CM

## 2018-07-31 LAB
ALBUMIN SERPL-MCNC: 4.4 G/DL (ref 3.4–4.8)
ALBUMIN/GLOB SERPL: 1.6 G/DL (ref 1.1–1.8)
ALP SERPL-CCNC: 60 U/L (ref 38–126)
ALT SERPL W P-5'-P-CCNC: 23 U/L (ref 21–72)
ANION GAP SERPL CALCULATED.3IONS-SCNC: 5 MMOL/L (ref 5–15)
ARTICHOKE IGE QN: 114 MG/DL (ref 1–129)
AST SERPL-CCNC: 39 U/L (ref 17–59)
BASOPHILS # BLD AUTO: 0.03 10*3/MM3 (ref 0–0.2)
BASOPHILS NFR BLD AUTO: 0.4 % (ref 0–2)
BILIRUB SERPL-MCNC: 0.6 MG/DL (ref 0.2–1.3)
BUN BLD-MCNC: 12 MG/DL (ref 7–21)
BUN/CREAT SERPL: 18.5 (ref 7–25)
CALCIUM SPEC-SCNC: 9.2 MG/DL (ref 8.4–10.2)
CHLORIDE SERPL-SCNC: 105 MMOL/L (ref 95–110)
CHOLEST SERPL-MCNC: 210 MG/DL (ref 0–199)
CO2 SERPL-SCNC: 28 MMOL/L (ref 22–31)
CREAT BLD-MCNC: 0.65 MG/DL (ref 0.7–1.3)
DEPRECATED RDW RBC AUTO: 43 FL (ref 35.1–43.9)
EOSINOPHIL # BLD AUTO: 0.13 10*3/MM3 (ref 0–0.7)
EOSINOPHIL NFR BLD AUTO: 1.9 % (ref 0–7)
ERYTHROCYTE [DISTWIDTH] IN BLOOD BY AUTOMATED COUNT: 13.6 % (ref 11.5–14.5)
GFR SERPL CREATININE-BSD FRML MDRD: 130 ML/MIN/1.73 (ref 56–130)
GLOBULIN UR ELPH-MCNC: 2.7 GM/DL (ref 2.3–3.5)
GLUCOSE BLD-MCNC: 126 MG/DL (ref 60–100)
HCT VFR BLD AUTO: 44.9 % (ref 39–49)
HDLC SERPL-MCNC: 49 MG/DL (ref 60–200)
HGB BLD-MCNC: 15.3 G/DL (ref 13.7–17.3)
IMM GRANULOCYTES # BLD: 0.01 10*3/MM3 (ref 0–0.02)
IMM GRANULOCYTES NFR BLD: 0.1 % (ref 0–0.5)
LDLC/HDLC SERPL: 2.85 {RATIO} (ref 0–3.55)
LYMPHOCYTES # BLD AUTO: 1.77 10*3/MM3 (ref 0.6–4.2)
LYMPHOCYTES NFR BLD AUTO: 26.3 % (ref 10–50)
MCH RBC QN AUTO: 29.6 PG (ref 26.5–34)
MCHC RBC AUTO-ENTMCNC: 34.1 G/DL (ref 31.5–36.3)
MCV RBC AUTO: 86.8 FL (ref 80–98)
MONOCYTES # BLD AUTO: 0.62 10*3/MM3 (ref 0–0.9)
MONOCYTES NFR BLD AUTO: 9.2 % (ref 0–12)
NEUTROPHILS # BLD AUTO: 4.18 10*3/MM3 (ref 2–8.6)
NEUTROPHILS NFR BLD AUTO: 62.1 % (ref 37–80)
PLATELET # BLD AUTO: 258 10*3/MM3 (ref 150–450)
PMV BLD AUTO: 9.8 FL (ref 8–12)
POTASSIUM BLD-SCNC: 4.2 MMOL/L (ref 3.5–5.1)
PROT SERPL-MCNC: 7.1 G/DL (ref 6.3–8.6)
RBC # BLD AUTO: 5.17 10*6/MM3 (ref 4.37–5.74)
SODIUM BLD-SCNC: 138 MMOL/L (ref 137–145)
TRIGL SERPL-MCNC: 106 MG/DL (ref 20–199)
WBC NRBC COR # BLD: 6.74 10*3/MM3 (ref 3.2–9.8)

## 2018-07-31 PROCEDURE — 80053 COMPREHEN METABOLIC PANEL: CPT | Performed by: NURSE PRACTITIONER

## 2018-07-31 PROCEDURE — 99214 OFFICE O/P EST MOD 30 MIN: CPT | Performed by: NURSE PRACTITIONER

## 2018-07-31 PROCEDURE — 80061 LIPID PANEL: CPT | Performed by: NURSE PRACTITIONER

## 2018-07-31 PROCEDURE — 85025 COMPLETE CBC W/AUTO DIFF WBC: CPT | Performed by: NURSE PRACTITIONER

## 2018-07-31 NOTE — PROGRESS NOTES
Subjective   Tre Nielsen is a 52 y.o. male.  Establish primary care.  Has history of hypercholesterolemia and chronic back pain.  Currently smokes about 1 pack per day.  Has no desire to quit smoking at this time.  Has history of anxiety and is treated with Paxil.    Hyperlipidemia   This is a chronic problem. The problem is controlled. Recent lipid tests were reviewed and are normal. Pertinent negatives include no chest pain or shortness of breath. Current antihyperlipidemic treatment includes statins. The current treatment provides significant improvement of lipids. There are no compliance problems.  Risk factors for coronary artery disease include male sex and dyslipidemia.   Nicotine Dependence   Presents for initial visit. Symptoms include irritability. Symptoms are negative for fatigue and insomnia. Preferred tobacco types include cigarettes. Preferred cigarette types include filtered. Preferred strength is regular. Preferred cigarettes are non-menthol. Preferred brands include [employer]. His urge triggers include company of smokers. His first smoke is from 6 to 8 AM. He smokes 1 pack of cigarettes per day. Age when first smoked: 21. Past treatments include nothing. The treatment provided no relief. Tre is not interested in quitting. Tre has tried to quit 1 time.   Back Pain   This is a chronic problem. The current episode started more than 1 year ago. The problem occurs constantly. The problem is unchanged. The pain is present in the lumbar spine. The pain does not radiate. The pain is at a severity of 8/10. The pain is moderate. The pain is the same all the time. Pertinent negatives include no chest pain or fever. Risk factors include lack of exercise. He has tried NSAIDs and muscle relaxant for the symptoms. The treatment provided mild relief.   Anxiety   Presents for follow-up visit. Symptoms include depressed mood, excessive worry, irritability, malaise and restlessness. Patient reports no  chest pain, compulsions, confusion, feeling of choking, hyperventilation, impotence, insomnia, muscle tension, nausea, obsessions, panic, shortness of breath or suicidal ideas. Symptoms occur occasionally. The severity of symptoms is moderate, causing significant distress and interfering with daily activities. The quality of sleep is fair. Nighttime awakenings: occasional.     Compliance with medications is %.        The following portions of the patient's history were reviewed and updated as appropriate:     He  has a past medical history of Candidiasis of skin and nails (02/03/2016); Chronic pain disorder; Depression; Dyslipidemia (07/28/2015); Joint pain (02/03/2016); Low back pain (05/10/2016); Muscle weakness (12/07/2015); Pure hypercholesterolemia (07/28/2015); and Tobacco dependence syndrome (06/09/2015).  He  does not have any pertinent problems on file.  He  has no past surgical history on file.  His family history includes Diabetes in his maternal grandmother and mother; Migraines in his sister.  He  reports that he has been smoking.  He has never used smokeless tobacco. He reports that he drinks alcohol. He reports that he uses drugs, including Marijuana.  Current Outpatient Prescriptions   Medication Sig Dispense Refill   • betamethasone valerate (VALISONE) 0.1 % ointment to affected area 45 g 0   • celecoxib (CeleBREX) 200 MG capsule Take 1 capsule by mouth 2 (Two) Times a Day. 60 capsule 1   • cyclobenzaprine (FLEXERIL) 10 MG tablet Take 1 tablet by mouth 3 (Three) Times a Day As Needed for Muscle Spasms. 90 tablet 1   • gabapentin (NEURONTIN) 600 MG tablet Take 1 tablet by mouth 3 (Three) Times a Day. 90 tablet 2   • PARoxetine (PAXIL) 30 MG tablet Take 1 tablet by mouth Every Morning. 30 tablet 3   • simvastatin (ZOCOR) 5 MG tablet TAKE 1 TABLET BY MOUTH EVERY NIGHT 90 tablet 0     No current facility-administered medications for this visit.      Current Outpatient Prescriptions on File Prior  to Visit   Medication Sig   • celecoxib (CeleBREX) 200 MG capsule Take 1 capsule by mouth 2 (Two) Times a Day.   • cyclobenzaprine (FLEXERIL) 10 MG tablet Take 1 tablet by mouth 3 (Three) Times a Day As Needed for Muscle Spasms.   • gabapentin (NEURONTIN) 600 MG tablet Take 1 tablet by mouth 3 (Three) Times a Day.   • PARoxetine (PAXIL) 30 MG tablet Take 1 tablet by mouth Every Morning.   • simvastatin (ZOCOR) 5 MG tablet TAKE 1 TABLET BY MOUTH EVERY NIGHT   • [DISCONTINUED] betamethasone valerate (VALISONE) 0.1 % ointment APPLY EXTERNALLY TO THE AFFECTED AREA TWICE DAILY     No current facility-administered medications on file prior to visit.      He has No Known Allergies..    Review of Systems   Constitutional: Positive for irritability. Negative for chills, diaphoresis, fatigue and fever.   Eyes: Negative.    Respiratory: Negative for shortness of breath.    Cardiovascular: Negative.  Negative for chest pain.   Gastrointestinal: Negative.  Negative for nausea.   Genitourinary: Negative.  Negative for impotence.   Musculoskeletal: Positive for back pain.   Skin: Negative.    Neurological: Negative.    Psychiatric/Behavioral: Negative.  Negative for confusion and suicidal ideas. The patient does not have insomnia.        Objective   Physical Exam   Constitutional: He is oriented to person, place, and time. He appears well-developed and well-nourished.   Strong odor of cigarette smoke   HENT:   Head: Normocephalic.   Right Ear: External ear normal.   Left Ear: External ear normal.   Eyes: Pupils are equal, round, and reactive to light. EOM are normal.   Neck: Normal range of motion. Neck supple.   Cardiovascular: Normal rate, regular rhythm and normal heart sounds.    Pulmonary/Chest: Effort normal and breath sounds normal.   Abdominal: Soft. Bowel sounds are normal.   Musculoskeletal: Normal range of motion.   Neurological: He is alert and oriented to person, place, and time.   Skin: Skin is warm. Capillary refill  takes less than 2 seconds.   Psychiatric: His behavior is normal. Judgment and thought content normal. His mood appears anxious. His speech is rapid and/or pressured. Cognition and memory are normal.   Nursing note and vitals reviewed.      Assessment/Plan   Problems Addressed this Visit        Cardiovascular and Mediastinum    Pure hypercholesterolemia    Relevant Orders    Lipid panel       Nervous and Auditory    Chronic bilateral low back pain without sciatica      Other Visit Diagnoses     Rash of hands    -  Primary    Relevant Medications    betamethasone valerate (VALISONE) 0.1 % ointment    Other Relevant Orders    CBC & Differential    Comprehensive Metabolic Panel    Encounter to establish care            1.  Rash of hands:  Continue on betamethasone as previously prescribed and refill prescription sent to pharmacy  Complete lab work as ordered including CBC and chemistry panel will notify with results when available    2.  Pure hypercholesterolemia:  Continue on Zocor as previously prescribed  Complete lab work as ordered including fasting lipid panel and will notify of results when available  Encouraged to adhere to low-fat diet    3.  Chronic bilateral low back pain without sciatica:  Continue on Celebrex, Flexeril and Neurontin as previously prescribed    4.  Encounter to establish care:  Continue on current medications as previously prescribed   Schedule follow-up appointment with this office in 3 months for recheck        This document has been electronically signed by LINDA Milan on July 31, 2018 10:09 AM

## 2018-09-04 ENCOUNTER — TELEPHONE (OUTPATIENT)
Dept: FAMILY MEDICINE CLINIC | Facility: CLINIC | Age: 53
End: 2018-09-04

## 2018-09-04 DIAGNOSIS — M54.50 CHRONIC BILATERAL LOW BACK PAIN WITHOUT SCIATICA: ICD-10-CM

## 2018-09-04 DIAGNOSIS — G89.29 CHRONIC BILATERAL LOW BACK PAIN WITHOUT SCIATICA: ICD-10-CM

## 2018-09-04 RX ORDER — CELECOXIB 200 MG/1
200 CAPSULE ORAL 2 TIMES DAILY
Qty: 60 CAPSULE | Refills: 1 | Status: SHIPPED | OUTPATIENT
Start: 2018-09-04 | End: 2018-11-13 | Stop reason: SDUPTHER

## 2018-09-04 RX ORDER — SIMVASTATIN 5 MG
5 TABLET ORAL NIGHTLY
Qty: 90 TABLET | Refills: 2 | Status: SHIPPED | OUTPATIENT
Start: 2018-09-04 | End: 2018-12-03 | Stop reason: DRUGHIGH

## 2018-09-04 RX ORDER — CYCLOBENZAPRINE HCL 10 MG
10 TABLET ORAL 3 TIMES DAILY PRN
Qty: 90 TABLET | Refills: 1 | Status: SHIPPED | OUTPATIENT
Start: 2018-09-04 | End: 2018-11-13 | Stop reason: SDUPTHER

## 2018-10-05 ENCOUNTER — TELEPHONE (OUTPATIENT)
Dept: FAMILY MEDICINE CLINIC | Facility: CLINIC | Age: 53
End: 2018-10-05

## 2018-10-08 DIAGNOSIS — G89.29 CHRONIC BILATERAL LOW BACK PAIN WITHOUT SCIATICA: Primary | ICD-10-CM

## 2018-10-08 DIAGNOSIS — Z79.899 HIGH RISK MEDICATION USE: ICD-10-CM

## 2018-10-08 DIAGNOSIS — M54.50 CHRONIC BILATERAL LOW BACK PAIN WITHOUT SCIATICA: Primary | ICD-10-CM

## 2018-10-08 RX ORDER — GABAPENTIN 600 MG/1
600 TABLET ORAL 3 TIMES DAILY
Qty: 90 TABLET | Refills: 2 | Status: SHIPPED | OUTPATIENT
Start: 2018-10-08 | End: 2018-11-30

## 2018-10-15 ENCOUNTER — DOCUMENTATION (OUTPATIENT)
Dept: FAMILY MEDICINE CLINIC | Facility: CLINIC | Age: 53
End: 2018-10-15

## 2018-10-15 NOTE — PROGRESS NOTES
Came by the office today to  a prescription for his gabapentin.  Staff reports he refused urine drug screen at that time so prescription for gabapentin withheld.

## 2018-11-13 DIAGNOSIS — G89.29 CHRONIC BILATERAL LOW BACK PAIN WITHOUT SCIATICA: ICD-10-CM

## 2018-11-13 DIAGNOSIS — M54.50 CHRONIC BILATERAL LOW BACK PAIN WITHOUT SCIATICA: ICD-10-CM

## 2018-11-13 DIAGNOSIS — R21 RASH OF HANDS: ICD-10-CM

## 2018-11-13 RX ORDER — CELECOXIB 200 MG/1
CAPSULE ORAL
Qty: 60 CAPSULE | Refills: 0 | Status: SHIPPED | OUTPATIENT
Start: 2018-11-13 | End: 2018-11-30 | Stop reason: SDUPTHER

## 2018-11-13 RX ORDER — CYCLOBENZAPRINE HCL 10 MG
TABLET ORAL
Qty: 90 TABLET | Refills: 0 | Status: SHIPPED | OUTPATIENT
Start: 2018-11-13 | End: 2018-11-30

## 2018-11-30 ENCOUNTER — OFFICE VISIT (OUTPATIENT)
Dept: FAMILY MEDICINE CLINIC | Facility: CLINIC | Age: 53
End: 2018-11-30

## 2018-11-30 ENCOUNTER — APPOINTMENT (OUTPATIENT)
Dept: LAB | Facility: HOSPITAL | Age: 53
End: 2018-11-30

## 2018-11-30 VITALS
DIASTOLIC BLOOD PRESSURE: 70 MMHG | HEIGHT: 70 IN | WEIGHT: 173 LBS | BODY MASS INDEX: 24.77 KG/M2 | SYSTOLIC BLOOD PRESSURE: 118 MMHG

## 2018-11-30 DIAGNOSIS — G89.29 CHRONIC BILATERAL LOW BACK PAIN WITHOUT SCIATICA: Primary | ICD-10-CM

## 2018-11-30 DIAGNOSIS — Z13.29 SCREENING FOR HYPOTHYROIDISM: ICD-10-CM

## 2018-11-30 DIAGNOSIS — M54.50 CHRONIC BILATERAL LOW BACK PAIN WITHOUT SCIATICA: Primary | ICD-10-CM

## 2018-11-30 DIAGNOSIS — E78.2 MIXED HYPERLIPIDEMIA: ICD-10-CM

## 2018-11-30 LAB
ALBUMIN SERPL-MCNC: 4.7 G/DL (ref 3.4–4.8)
ALBUMIN/GLOB SERPL: 1.7 G/DL (ref 1.1–1.8)
ALP SERPL-CCNC: 74 U/L (ref 38–126)
ALT SERPL W P-5'-P-CCNC: 18 U/L (ref 21–72)
ANION GAP SERPL CALCULATED.3IONS-SCNC: 11 MMOL/L (ref 5–15)
ARTICHOKE IGE QN: 171 MG/DL (ref 1–129)
AST SERPL-CCNC: 27 U/L (ref 17–59)
BILIRUB SERPL-MCNC: 0.7 MG/DL (ref 0.2–1.3)
BUN BLD-MCNC: 14 MG/DL (ref 7–21)
BUN/CREAT SERPL: 19.4 (ref 7–25)
CALCIUM SPEC-SCNC: 9.3 MG/DL (ref 8.4–10.2)
CHLORIDE SERPL-SCNC: 99 MMOL/L (ref 95–110)
CHOLEST SERPL-MCNC: 255 MG/DL (ref 0–199)
CO2 SERPL-SCNC: 26 MMOL/L (ref 22–31)
CREAT BLD-MCNC: 0.72 MG/DL (ref 0.7–1.3)
GFR SERPL CREATININE-BSD FRML MDRD: 115 ML/MIN/1.73 (ref 56–130)
GLOBULIN UR ELPH-MCNC: 2.7 GM/DL (ref 2.3–3.5)
GLUCOSE BLD-MCNC: 98 MG/DL (ref 60–100)
HDLC SERPL-MCNC: 55 MG/DL (ref 60–200)
LDLC/HDLC SERPL: 3.26 {RATIO} (ref 0–3.55)
POTASSIUM BLD-SCNC: 4.3 MMOL/L (ref 3.5–5.1)
PROT SERPL-MCNC: 7.4 G/DL (ref 6.3–8.6)
SODIUM BLD-SCNC: 136 MMOL/L (ref 137–145)
TRIGL SERPL-MCNC: 103 MG/DL (ref 20–199)
TSH SERPL DL<=0.05 MIU/L-ACNC: 1.41 MIU/ML (ref 0.46–4.68)

## 2018-11-30 PROCEDURE — 99213 OFFICE O/P EST LOW 20 MIN: CPT | Performed by: NURSE PRACTITIONER

## 2018-11-30 PROCEDURE — 84443 ASSAY THYROID STIM HORMONE: CPT | Performed by: NURSE PRACTITIONER

## 2018-11-30 PROCEDURE — 96372 THER/PROPH/DIAG INJ SC/IM: CPT | Performed by: NURSE PRACTITIONER

## 2018-11-30 PROCEDURE — 80061 LIPID PANEL: CPT | Performed by: NURSE PRACTITIONER

## 2018-11-30 PROCEDURE — 80053 COMPREHEN METABOLIC PANEL: CPT | Performed by: NURSE PRACTITIONER

## 2018-11-30 RX ORDER — DULOXETIN HYDROCHLORIDE 60 MG/1
60 CAPSULE, DELAYED RELEASE ORAL DAILY
COMMUNITY
End: 2019-05-29

## 2018-11-30 RX ORDER — CELECOXIB 200 MG/1
200 CAPSULE ORAL 2 TIMES DAILY
Qty: 60 CAPSULE | Refills: 3 | Status: SHIPPED | OUTPATIENT
Start: 2018-11-30 | End: 2019-05-29

## 2018-11-30 RX ORDER — KETOROLAC TROMETHAMINE 30 MG/ML
60 INJECTION, SOLUTION INTRAMUSCULAR; INTRAVENOUS DAILY
Status: DISCONTINUED | OUTPATIENT
Start: 2018-11-30 | End: 2018-11-30

## 2018-11-30 RX ORDER — METHOCARBAMOL 750 MG/1
750 TABLET, FILM COATED ORAL 3 TIMES DAILY PRN
Qty: 90 TABLET | Refills: 2 | Status: SHIPPED | OUTPATIENT
Start: 2018-11-30 | End: 2019-06-25 | Stop reason: SDUPTHER

## 2018-11-30 RX ORDER — KETOROLAC TROMETHAMINE 30 MG/ML
60 INJECTION, SOLUTION INTRAMUSCULAR; INTRAVENOUS ONCE
Status: COMPLETED | OUTPATIENT
Start: 2018-11-30 | End: 2018-11-30

## 2018-11-30 RX ORDER — TRIAMCINOLONE ACETONIDE 40 MG/ML
80 INJECTION, SUSPENSION INTRA-ARTICULAR; INTRAMUSCULAR ONCE
Status: COMPLETED | OUTPATIENT
Start: 2018-11-30 | End: 2018-11-30

## 2018-11-30 RX ADMIN — KETOROLAC TROMETHAMINE 60 MG: 30 INJECTION, SOLUTION INTRAMUSCULAR; INTRAVENOUS at 08:42

## 2018-11-30 RX ADMIN — TRIAMCINOLONE ACETONIDE 80 MG: 40 INJECTION, SUSPENSION INTRA-ARTICULAR; INTRAMUSCULAR at 08:42

## 2018-11-30 NOTE — PROGRESS NOTES
"Subjective   Tre Maren Nielsen is a 53 y.o. male.  Three month follow-up.  Continues to complain of increasing low back pain.  Has been out of his Neurontin for the past couple of months \"because I can't pass a drug test.\"    Hyperlipidemia   This is a chronic problem. The problem is controlled. Recent lipid tests were reviewed and are normal. Current antihyperlipidemic treatment includes statins. The current treatment provides significant improvement of lipids. There are no compliance problems.  Risk factors for coronary artery disease include male sex and dyslipidemia.   Back Pain   This is a chronic problem. The current episode started more than 1 year ago. The problem occurs constantly. The problem is unchanged. The pain is present in the lumbar spine. The pain does not radiate. The pain is at a severity of 8/10. The pain is moderate. The pain is the same all the time. Pertinent negatives include no fever. Risk factors include lack of exercise. He has tried NSAIDs and muscle relaxant for the symptoms. The treatment provided mild relief.        The following portions of the patient's history were reviewed and updated as appropriate: allergies, current medications, past family history, past medical history, past social history, past surgical history and problem list.    Review of Systems   Constitutional: Negative.  Negative for chills, diaphoresis, fatigue and fever.   HENT: Negative.    Eyes: Negative.    Respiratory: Negative.    Cardiovascular: Negative.    Gastrointestinal: Negative.    Genitourinary: Negative.    Musculoskeletal: Positive for back pain.   Skin: Negative.    Neurological: Negative.    Psychiatric/Behavioral: Negative.        Objective   Physical Exam   Constitutional: He is oriented to person, place, and time. He appears well-developed and well-nourished.   HENT:   Head: Normocephalic.   Right Ear: External ear normal.   Left Ear: External ear normal.   Eyes: EOM are normal. Pupils are " equal, round, and reactive to light.   Neck: Normal range of motion. Neck supple.   Cardiovascular: Normal rate, regular rhythm and normal heart sounds.   Pulmonary/Chest: Effort normal and breath sounds normal.   Abdominal: Soft. Bowel sounds are normal.   Musculoskeletal: Normal range of motion.   Neurological: He is alert and oriented to person, place, and time.   Skin: Skin is warm. Capillary refill takes less than 2 seconds.   Psychiatric: He has a normal mood and affect. His behavior is normal.   Nursing note and vitals reviewed.      Assessment/Plan   Problems Addressed this Visit        Nervous and Auditory    Low back pain - Primary    Relevant Medications    methocarbamol (ROBAXIN) 750 MG tablet    celecoxib (CeleBREX) 200 MG capsule    triamcinolone acetonide (KENALOG-40) injection 80 mg    ketorolac (TORADOL) injection 60 mg (Start on 11/30/2018  9:00 AM)      Other Visit Diagnoses     Mixed hyperlipidemia        Relevant Orders    Lipid Panel    Comprehensive Metabolic Panel    Screening for hypothyroidism        Relevant Orders    TSH        1.  Low back pain:  Discontinue Neurontin as previously prescribed  Discontinue Flexeril as previously prescribed  Begin Robaxin 750 mg as prescribed be taken 1 by mouth 3 times a day when necessary for pain  Educated on possible sedative side effects of this medication encouraged not to take this medication work or drive  Continue on Celebrex previously prescribed and refill prescription sent to pharmacy  Kenalog 80 mg IM given in office  Educated on possible side effects of steroidal medications including but not limited to an increased risk for osteoporosis  Toradol 60 mg IM given in office  Will continue orthopedic follow-up with Dr. Cameron in [facility] with [facility]    2.  Mixed hyperlipidemia:  Complete fasting lipid pane and COMPl as ordered and will notify of results when available  Continue on Zocor as previously  prescribed and refill prescription sent to pharmacy    3.  Screening for hypothyroidism:  Complete TSH as ordered and will notify of results when available    Continue on current medications as previously prescribed   Schedule follow-up appointment with this office in 3 months for recheck of back pain and hyperlipidemia or sooner as needed        This document has been electronically signed by LINDA Milan on November 30, 2018 8:39 AM

## 2018-12-03 ENCOUNTER — TELEPHONE (OUTPATIENT)
Dept: FAMILY MEDICINE CLINIC | Facility: CLINIC | Age: 53
End: 2018-12-03

## 2018-12-03 DIAGNOSIS — E78.00 PURE HYPERCHOLESTEROLEMIA: Primary | ICD-10-CM

## 2018-12-03 RX ORDER — SIMVASTATIN 10 MG
10 TABLET ORAL NIGHTLY
Qty: 30 TABLET | Refills: 5 | Status: SHIPPED | OUTPATIENT
Start: 2018-12-03 | End: 2019-05-30 | Stop reason: DRUGHIGH

## 2018-12-17 ENCOUNTER — CLINICAL SUPPORT (OUTPATIENT)
Dept: FAMILY MEDICINE CLINIC | Facility: CLINIC | Age: 53
End: 2018-12-17

## 2018-12-17 DIAGNOSIS — Z23 FLU VACCINE NEED: Primary | ICD-10-CM

## 2018-12-17 PROCEDURE — 90471 IMMUNIZATION ADMIN: CPT | Performed by: NURSE PRACTITIONER

## 2018-12-17 PROCEDURE — 90674 CCIIV4 VAC NO PRSV 0.5 ML IM: CPT | Performed by: NURSE PRACTITIONER

## 2018-12-30 DIAGNOSIS — M54.50 CHRONIC BILATERAL LOW BACK PAIN WITHOUT SCIATICA: ICD-10-CM

## 2018-12-30 DIAGNOSIS — G89.29 CHRONIC BILATERAL LOW BACK PAIN WITHOUT SCIATICA: ICD-10-CM

## 2019-01-02 RX ORDER — CELECOXIB 200 MG/1
CAPSULE ORAL
Qty: 60 CAPSULE | Refills: 0 | Status: SHIPPED | OUTPATIENT
Start: 2019-01-02 | End: 2019-02-28 | Stop reason: SDUPTHER

## 2019-02-28 ENCOUNTER — OFFICE VISIT (OUTPATIENT)
Dept: FAMILY MEDICINE CLINIC | Facility: CLINIC | Age: 54
End: 2019-02-28

## 2019-02-28 VITALS
DIASTOLIC BLOOD PRESSURE: 76 MMHG | BODY MASS INDEX: 24.18 KG/M2 | SYSTOLIC BLOOD PRESSURE: 108 MMHG | WEIGHT: 168.9 LBS | HEIGHT: 70 IN

## 2019-02-28 DIAGNOSIS — G89.29 CHRONIC BILATERAL LOW BACK PAIN WITH BILATERAL SCIATICA: Primary | ICD-10-CM

## 2019-02-28 DIAGNOSIS — M54.42 CHRONIC BILATERAL LOW BACK PAIN WITH BILATERAL SCIATICA: Primary | ICD-10-CM

## 2019-02-28 DIAGNOSIS — F17.200 TOBACCO DEPENDENCE: ICD-10-CM

## 2019-02-28 DIAGNOSIS — M54.41 CHRONIC BILATERAL LOW BACK PAIN WITH BILATERAL SCIATICA: Primary | ICD-10-CM

## 2019-02-28 PROCEDURE — 99213 OFFICE O/P EST LOW 20 MIN: CPT | Performed by: NURSE PRACTITIONER

## 2019-02-28 PROCEDURE — 99406 BEHAV CHNG SMOKING 3-10 MIN: CPT | Performed by: NURSE PRACTITIONER

## 2019-02-28 PROCEDURE — 96372 THER/PROPH/DIAG INJ SC/IM: CPT | Performed by: NURSE PRACTITIONER

## 2019-02-28 RX ORDER — KETOROLAC TROMETHAMINE 30 MG/ML
60 INJECTION, SOLUTION INTRAMUSCULAR; INTRAVENOUS ONCE
Status: COMPLETED | OUTPATIENT
Start: 2019-02-28 | End: 2019-02-28

## 2019-02-28 RX ADMIN — KETOROLAC TROMETHAMINE 60 MG: 30 INJECTION, SOLUTION INTRAMUSCULAR; INTRAVENOUS at 11:10

## 2019-03-21 DIAGNOSIS — Z01.812 PRE-OPERATIVE LABORATORY EXAMINATION: ICD-10-CM

## 2019-03-21 DIAGNOSIS — Z01.810 PRE-OPERATIVE CARDIOVASCULAR EXAMINATION: ICD-10-CM

## 2019-03-21 DIAGNOSIS — Z01.818 PREOP EXAMINATION: Primary | ICD-10-CM

## 2019-04-09 ENCOUNTER — TRANSCRIBE ORDERS (OUTPATIENT)
Dept: CARDIAC SURGERY | Facility: CLINIC | Age: 54
End: 2019-04-09

## 2019-04-09 ENCOUNTER — LAB (OUTPATIENT)
Dept: LAB | Facility: HOSPITAL | Age: 54
End: 2019-04-09

## 2019-04-09 DIAGNOSIS — Z01.812 PRE-OPERATIVE LABORATORY EXAMINATION: ICD-10-CM

## 2019-04-09 DIAGNOSIS — Z79.899 HIGH RISK MEDICATION USE: ICD-10-CM

## 2019-04-09 DIAGNOSIS — Z01.818 PREOP EXAMINATION: ICD-10-CM

## 2019-04-09 DIAGNOSIS — Z01.818 PREOP EXAMINATION: Primary | ICD-10-CM

## 2019-04-09 LAB
AMPHET+METHAMPHET UR QL: NEGATIVE
ANION GAP SERPL CALCULATED.3IONS-SCNC: 13.5 MMOL/L
APTT PPP: 28.5 SECONDS (ref 20–40.3)
BACTERIA UR QL AUTO: NORMAL /HPF
BARBITURATES UR QL SCN: NEGATIVE
BASOPHILS # BLD AUTO: 0.06 10*3/MM3 (ref 0–0.2)
BASOPHILS NFR BLD AUTO: 0.7 % (ref 0–1.5)
BENZODIAZ UR QL SCN: NEGATIVE
BILIRUB UR QL STRIP: NEGATIVE
BUN BLD-MCNC: 14 MG/DL (ref 6–20)
BUN/CREAT SERPL: 18.7 (ref 7–25)
CALCIUM SPEC-SCNC: 10 MG/DL (ref 8.6–10.5)
CANNABINOIDS SERPL QL: POSITIVE
CHLORIDE SERPL-SCNC: 101 MMOL/L (ref 98–107)
CLARITY UR: CLEAR
CO2 SERPL-SCNC: 27.5 MMOL/L (ref 22–29)
COCAINE UR QL: NEGATIVE
COLOR UR: YELLOW
CREAT BLD-MCNC: 0.75 MG/DL (ref 0.76–1.27)
DEPRECATED RDW RBC AUTO: 41.9 FL (ref 37–54)
EOSINOPHIL # BLD AUTO: 0.16 10*3/MM3 (ref 0–0.4)
EOSINOPHIL NFR BLD AUTO: 1.9 % (ref 0.3–6.2)
ERYTHROCYTE [DISTWIDTH] IN BLOOD BY AUTOMATED COUNT: 13.2 % (ref 12.3–15.4)
GFR SERPL CREATININE-BSD FRML MDRD: 109 ML/MIN/1.73
GLUCOSE BLD-MCNC: 102 MG/DL (ref 65–99)
GLUCOSE UR STRIP-MCNC: NEGATIVE MG/DL
HCT VFR BLD AUTO: 42.3 % (ref 37.5–51)
HGB BLD-MCNC: 13.8 G/DL (ref 13–17.7)
HGB UR QL STRIP.AUTO: NEGATIVE
HYALINE CASTS UR QL AUTO: NORMAL /LPF
IMM GRANULOCYTES # BLD AUTO: 0.03 10*3/MM3 (ref 0–0.05)
IMM GRANULOCYTES NFR BLD AUTO: 0.3 % (ref 0–0.5)
INR PPP: 1.02 (ref 0.8–1.2)
KETONES UR QL STRIP: NEGATIVE
LEUKOCYTE ESTERASE UR QL STRIP.AUTO: ABNORMAL
LYMPHOCYTES # BLD AUTO: 2.12 10*3/MM3 (ref 0.7–3.1)
LYMPHOCYTES NFR BLD AUTO: 24.7 % (ref 19.6–45.3)
MCH RBC QN AUTO: 28.3 PG (ref 26.6–33)
MCHC RBC AUTO-ENTMCNC: 32.6 G/DL (ref 31.5–35.7)
MCV RBC AUTO: 86.9 FL (ref 79–97)
METHADONE UR QL SCN: NEGATIVE
MONOCYTES # BLD AUTO: 0.48 10*3/MM3 (ref 0.1–0.9)
MONOCYTES NFR BLD AUTO: 5.6 % (ref 5–12)
NEUTROPHILS # BLD AUTO: 5.74 10*3/MM3 (ref 1.4–7)
NEUTROPHILS NFR BLD AUTO: 66.8 % (ref 42.7–76)
NITRITE UR QL STRIP: NEGATIVE
NRBC BLD AUTO-RTO: 0 /100 WBC (ref 0–0)
OPIATES UR QL: NEGATIVE
OXYCODONE UR QL SCN: NEGATIVE
PH UR STRIP.AUTO: 8.5 [PH] (ref 5–8)
PLATELET # BLD AUTO: 333 10*3/MM3 (ref 140–450)
PMV BLD AUTO: 9.3 FL (ref 6–12)
POTASSIUM BLD-SCNC: 4.7 MMOL/L (ref 3.5–5.2)
PROT UR QL STRIP: NEGATIVE
PROTHROMBIN TIME: 13.2 SECONDS (ref 11.1–15.3)
RBC # BLD AUTO: 4.87 10*6/MM3 (ref 4.14–5.8)
RBC # UR: NORMAL /HPF
REF LAB TEST METHOD: NORMAL
SODIUM BLD-SCNC: 142 MMOL/L (ref 136–145)
SP GR UR STRIP: 1.02 (ref 1–1.03)
SQUAMOUS #/AREA URNS HPF: NORMAL /HPF
UROBILINOGEN UR QL STRIP: ABNORMAL
WBC NRBC COR # BLD: 8.59 10*3/MM3 (ref 3.4–10.8)
WBC UR QL AUTO: NORMAL /HPF

## 2019-04-09 PROCEDURE — 80307 DRUG TEST PRSMV CHEM ANLYZR: CPT

## 2019-04-09 PROCEDURE — 85610 PROTHROMBIN TIME: CPT

## 2019-04-09 PROCEDURE — 81001 URINALYSIS AUTO W/SCOPE: CPT

## 2019-04-09 PROCEDURE — 80048 BASIC METABOLIC PNL TOTAL CA: CPT

## 2019-04-09 PROCEDURE — 85730 THROMBOPLASTIN TIME PARTIAL: CPT

## 2019-04-09 PROCEDURE — 85025 COMPLETE CBC W/AUTO DIFF WBC: CPT

## 2019-05-03 DIAGNOSIS — R21 RASH OF HANDS: ICD-10-CM

## 2019-05-29 ENCOUNTER — OFFICE VISIT (OUTPATIENT)
Dept: FAMILY MEDICINE CLINIC | Facility: CLINIC | Age: 54
End: 2019-05-29

## 2019-05-29 ENCOUNTER — LAB (OUTPATIENT)
Dept: LAB | Facility: HOSPITAL | Age: 54
End: 2019-05-29

## 2019-05-29 VITALS
BODY MASS INDEX: 22.94 KG/M2 | WEIGHT: 160.2 LBS | SYSTOLIC BLOOD PRESSURE: 122 MMHG | HEIGHT: 70 IN | DIASTOLIC BLOOD PRESSURE: 82 MMHG

## 2019-05-29 DIAGNOSIS — E78.00 PURE HYPERCHOLESTEROLEMIA: ICD-10-CM

## 2019-05-29 DIAGNOSIS — M54.50 CHRONIC BILATERAL LOW BACK PAIN WITHOUT SCIATICA: ICD-10-CM

## 2019-05-29 DIAGNOSIS — R21 RASH OF HANDS: ICD-10-CM

## 2019-05-29 DIAGNOSIS — G89.29 CHRONIC BILATERAL LOW BACK PAIN WITHOUT SCIATICA: ICD-10-CM

## 2019-05-29 DIAGNOSIS — F17.200 TOBACCO DEPENDENCE SYNDROME: ICD-10-CM

## 2019-05-29 DIAGNOSIS — E78.00 PURE HYPERCHOLESTEROLEMIA: Primary | ICD-10-CM

## 2019-05-29 PROBLEM — M48.061 FORAMINAL STENOSIS OF LUMBAR REGION: Status: ACTIVE | Noted: 2019-05-06

## 2019-05-29 PROBLEM — M43.10 DEGENERATIVE SPONDYLOLISTHESIS: Status: ACTIVE | Noted: 2019-05-06

## 2019-05-29 LAB
CHOLEST SERPL-MCNC: 224 MG/DL (ref 0–200)
HDLC SERPL-MCNC: 43 MG/DL (ref 40–60)
LDLC SERPL CALC-MCNC: 159 MG/DL (ref 0–100)
LDLC/HDLC SERPL: 3.7 {RATIO}
TRIGL SERPL-MCNC: 109 MG/DL (ref 0–150)
VLDLC SERPL-MCNC: 21.8 MG/DL (ref 5–40)

## 2019-05-29 PROCEDURE — 80061 LIPID PANEL: CPT

## 2019-05-29 PROCEDURE — 99214 OFFICE O/P EST MOD 30 MIN: CPT | Performed by: NURSE PRACTITIONER

## 2019-05-29 RX ORDER — HYDROCODONE BITARTRATE AND ACETAMINOPHEN 5; 325 MG/1; MG/1
TABLET ORAL
Refills: 0 | COMMUNITY
Start: 2019-05-23 | End: 2019-10-23

## 2019-05-29 NOTE — PROGRESS NOTES
"Subjective   Tre Nielsen is a 53 y.o. male.  Three month follow-up.  Had ALIF of l5-S1 on May eighth 2019.  Back pain has dramatically improved since the procedure.  Has been complaining of a dry, scaly rash to his hands for the past several months to year.  \"I need you to send me to somebody because I need to get this taken care of.\"    Back Pain   This is a chronic problem. The current episode started more than 1 year ago. The problem occurs intermittently. The problem has been waxing and waning since onset. The pain is present in the lumbar spine. The pain does not radiate. The pain is at a severity of 5/10. The pain is mild. Pertinent negatives include no chest pain or fever. Risk factors include lack of exercise. He has tried NSAIDs and muscle relaxant for the symptoms. The treatment provided mild relief.   Nicotine Dependence   Presents for initial visit. Symptoms are negative for fatigue. Preferred tobacco types include cigarettes. Preferred cigarette types include filtered. Preferred strength is regular. Preferred cigarettes are non-menthol. Preferred brands include HSTYLE. His urge triggers include company of smokers. His first smoke is from 6 to 8 AM. He smokes 1 pack of cigarettes per day. Age when first smoked: 21. Past treatments include nothing. The treatment provided no relief. Tre is not interested in quitting. Tre has tried to quit 1 time.   Hyperlipidemia   This is a chronic problem. The current episode started more than 1 year ago. The problem is controlled. Factors aggravating his hyperlipidemia include smoking and fatty foods. Pertinent negatives include no chest pain or shortness of breath. Current antihyperlipidemic treatment includes statins. The current treatment provides significant improvement of lipids. There are no compliance problems.  Risk factors for coronary artery disease include male sex and dyslipidemia.   Rash   This is a chronic problem. The current episode " started more than 1 month ago. The problem has been waxing and waning since onset. The affected locations include the left hand and right hand. The rash is characterized by peeling and itchiness. He was exposed to nothing. Pertinent negatives include no fatigue, fever or shortness of breath. Past treatments include cold compress, moisturizer and anti-itch cream. The treatment provided no relief.        The following portions of the patient's history were reviewed and updated as appropriate:     Current Outpatient Medications   Medication Sig Dispense Refill   • betamethasone valerate (VALISONE) 0.1 % ointment APPLY EXTERNALLY TO THE AFFECTED AREA TWICE DAILY AS NEEDED 45 g 0   • HYDROcodone-acetaminophen (NORCO) 5-325 MG per tablet TK 1 T PO Q 4 H PRN FOR PAIN  0   • methocarbamol (ROBAXIN) 750 MG tablet Take 1 tablet by mouth 3 (Three) Times a Day As Needed for Muscle Spasms. 90 tablet 2   • simvastatin (ZOCOR) 20 MG tablet Take 1 tablet by mouth Every Night. 30 tablet 5     No current facility-administered medications for this visit.      Current Outpatient Medications on File Prior to Visit   Medication Sig   • betamethasone valerate (VALISONE) 0.1 % ointment APPLY EXTERNALLY TO THE AFFECTED AREA TWICE DAILY AS NEEDED   • HYDROcodone-acetaminophen (NORCO) 5-325 MG per tablet TK 1 T PO Q 4 H PRN FOR PAIN   • methocarbamol (ROBAXIN) 750 MG tablet Take 1 tablet by mouth 3 (Three) Times a Day As Needed for Muscle Spasms.     No current facility-administered medications on file prior to visit.      He has No Known Allergies..    Review of Systems   Constitutional: Negative.  Negative for chills, diaphoresis, fatigue and fever.   HENT: Negative.    Eyes: Negative.    Respiratory: Negative.  Negative for shortness of breath.    Cardiovascular: Negative for chest pain.   Gastrointestinal: Negative.    Genitourinary: Negative.    Musculoskeletal: Positive for back pain.   Skin: Positive for rash.   Neurological: Negative.  "   Psychiatric/Behavioral: Negative.  Negative for confusion.       Objective    Visit Vitals  /82   Ht 177.8 cm (70\")   Wt 72.7 kg (160 lb 3.2 oz)   BMI 22.99 kg/m²       Physical Exam   Constitutional: He is oriented to person, place, and time. He appears well-developed and well-nourished.   HENT:   Head: Normocephalic.   Right Ear: External ear normal.   Left Ear: External ear normal.   Eyes: EOM are normal. Pupils are equal, round, and reactive to light.   Neck: Normal range of motion. Neck supple.   Cardiovascular: Normal rate, regular rhythm and normal heart sounds.   Pulmonary/Chest: Effort normal and breath sounds normal.   Abdominal: Soft. Bowel sounds are normal.   Musculoskeletal: Normal range of motion.   Neurological: He is alert and oriented to person, place, and time.   Skin: Skin is warm. Capillary refill takes less than 2 seconds.   Dry, peeling, cracking skin noted to bilateral palms and in between fingers.  No redness or drainage noted   Psychiatric: He has a normal mood and affect. His behavior is normal.   Nursing note and vitals reviewed.      Assessment/Plan   Problems Addressed this Visit        Cardiovascular and Mediastinum    Pure hypercholesterolemia - Primary       Nervous and Auditory    Chronic bilateral low back pain without sciatica       Other    Tobacco dependence syndrome      Other Visit Diagnoses     Rash of hands        Relevant Orders    Ambulatory Referral to Dermatology      No orders of the defined types were placed in this encounter.      1.  Low back pain:  Continue on Norco and Robaxin as prescribed by orthopedic surgeon  Educated on possible sedative side effects of this medication encouraged not to take this medication work or drive  Will continue orthopedic follow-up with Dr. Cameron in [facility] with [facility]     2.  Mixed hyperlipidemia:  Complete fasting lipid pane and COMPl as ordered and will notify of results when " available  Continue on Zocor as previously prescribed and refill prescription sent to pharmacy  Encouraged to adhere to low-fat diet    3.  Tobacco dependence syndrome:  I advised Tre of the risks of continuing to use tobacco, and I provided him with tobacco cessation educational materials in the After Visit Summary.   During this visit, I spent 4 minutes counseling the patient regarding tobacco cessation.  No desire to stop smoking at this time    4.  Rash of hands:  Referral placed to dermatology will call to schedule appointment  Encouraged to thoroughly clean hands daily with antibacterial soap and water head air dry  May use moisturizing lotion as needed    Continue on current medications as previously prescribed   Return in about 6 months (around 11/29/2019) for Annual physical.        This document has been electronically signed by LINDA Milan on May 31, 2019 2:58 PM

## 2019-05-30 ENCOUNTER — TELEPHONE (OUTPATIENT)
Dept: FAMILY MEDICINE CLINIC | Facility: CLINIC | Age: 54
End: 2019-05-30

## 2019-05-30 DIAGNOSIS — E78.2 MIXED HYPERLIPIDEMIA: Primary | ICD-10-CM

## 2019-05-30 RX ORDER — SIMVASTATIN 20 MG
20 TABLET ORAL NIGHTLY
Qty: 30 TABLET | Refills: 5 | Status: SHIPPED | OUTPATIENT
Start: 2019-05-30 | End: 2020-01-03

## 2019-05-30 NOTE — TELEPHONE ENCOUNTER
Per LINDA Lagunas, Mr. Nielsen has been called with recent lab results & recommendations.  Continue current medications and follow-up as planned or sooner if any problems.      ----- Message from LINDA Milan sent at 5/30/2019  7:27 AM CDT -----  While cholesterol has slightly improved and is now down to 224 to still elevated as well as his bad cholesterol 159.  Going to increase his Zocor from 10 mg daily to 20 mg daily and I have sent a new prescription to his pharmacy.  He needs to watch the fats in his diet.  With his pain improving in his back the more he walks the better his cholesterol will also be.  Thank you.

## 2019-06-25 DIAGNOSIS — G89.29 CHRONIC BILATERAL LOW BACK PAIN WITHOUT SCIATICA: ICD-10-CM

## 2019-06-25 DIAGNOSIS — M54.50 CHRONIC BILATERAL LOW BACK PAIN WITHOUT SCIATICA: ICD-10-CM

## 2019-06-25 RX ORDER — METHOCARBAMOL 750 MG/1
TABLET, FILM COATED ORAL
Qty: 90 TABLET | Refills: 0 | Status: SHIPPED | OUTPATIENT
Start: 2019-06-25 | End: 2019-08-06 | Stop reason: SDUPTHER

## 2019-08-06 DIAGNOSIS — G89.29 CHRONIC BILATERAL LOW BACK PAIN WITHOUT SCIATICA: ICD-10-CM

## 2019-08-06 DIAGNOSIS — M54.50 CHRONIC BILATERAL LOW BACK PAIN WITHOUT SCIATICA: ICD-10-CM

## 2019-08-06 RX ORDER — METHOCARBAMOL 750 MG/1
TABLET, FILM COATED ORAL
Qty: 90 TABLET | Refills: 0 | Status: SHIPPED | OUTPATIENT
Start: 2019-08-06 | End: 2019-09-05 | Stop reason: SDUPTHER

## 2019-09-05 DIAGNOSIS — M54.50 CHRONIC BILATERAL LOW BACK PAIN WITHOUT SCIATICA: ICD-10-CM

## 2019-09-05 DIAGNOSIS — G89.29 CHRONIC BILATERAL LOW BACK PAIN WITHOUT SCIATICA: ICD-10-CM

## 2019-09-05 RX ORDER — METHOCARBAMOL 750 MG/1
TABLET, FILM COATED ORAL
Qty: 90 TABLET | Refills: 0 | Status: SHIPPED | OUTPATIENT
Start: 2019-09-05 | End: 2019-11-04 | Stop reason: SDUPTHER

## 2019-10-12 ENCOUNTER — HOSPITAL ENCOUNTER (INPATIENT)
Facility: HOSPITAL | Age: 54
LOS: 3 days | Discharge: HOME | End: 2019-10-15
Attending: FAMILY MEDICINE | Admitting: INTERNAL MEDICINE

## 2019-10-12 ENCOUNTER — APPOINTMENT (OUTPATIENT)
Dept: CT IMAGING | Facility: HOSPITAL | Age: 54
End: 2019-10-12

## 2019-10-12 ENCOUNTER — APPOINTMENT (OUTPATIENT)
Dept: GENERAL RADIOLOGY | Facility: HOSPITAL | Age: 54
End: 2019-10-12

## 2019-10-12 DIAGNOSIS — R25.3 MUSCLE TWITCHING: ICD-10-CM

## 2019-10-12 DIAGNOSIS — I21.4 NSTEMI (NON-ST ELEVATED MYOCARDIAL INFARCTION) (HCC): Primary | ICD-10-CM

## 2019-10-12 LAB
ALBUMIN SERPL-MCNC: 4.7 G/DL (ref 3.5–5.2)
ALBUMIN/GLOB SERPL: 2.1 G/DL
ALP SERPL-CCNC: 88 U/L (ref 39–117)
ALT SERPL W P-5'-P-CCNC: 24 U/L (ref 1–41)
AMPHET+METHAMPHET UR QL: NEGATIVE
AMPHETAMINES UR QL: NEGATIVE
ANION GAP SERPL CALCULATED.3IONS-SCNC: 11 MMOL/L (ref 5–15)
ANION GAP SERPL CALCULATED.3IONS-SCNC: 12 MMOL/L (ref 5–15)
APAP SERPL-MCNC: <5 MCG/ML (ref 10–30)
AST SERPL-CCNC: 33 U/L (ref 1–40)
BARBITURATES UR QL SCN: NEGATIVE
BASOPHILS # BLD AUTO: 0.07 10*3/MM3 (ref 0–0.2)
BASOPHILS # BLD MANUAL: 0.13 10*3/MM3 (ref 0–0.2)
BASOPHILS NFR BLD AUTO: 0.5 % (ref 0–1.5)
BASOPHILS NFR BLD AUTO: 1 % (ref 0–1.5)
BENZODIAZ UR QL SCN: NEGATIVE
BILIRUB SERPL-MCNC: 0.5 MG/DL (ref 0.2–1.2)
BUN BLD-MCNC: 13 MG/DL (ref 6–20)
BUN BLD-MCNC: 14 MG/DL (ref 6–20)
BUN/CREAT SERPL: 17.1 (ref 7–25)
BUN/CREAT SERPL: 20.6 (ref 7–25)
BUPRENORPHINE SERPL-MCNC: NEGATIVE NG/ML
CALCIUM SPEC-SCNC: 9.1 MG/DL (ref 8.6–10.5)
CALCIUM SPEC-SCNC: 9.6 MG/DL (ref 8.6–10.5)
CANNABINOIDS SERPL QL: POSITIVE
CHLORIDE SERPL-SCNC: 103 MMOL/L (ref 98–107)
CHLORIDE SERPL-SCNC: 105 MMOL/L (ref 98–107)
CO2 SERPL-SCNC: 23 MMOL/L (ref 22–29)
CO2 SERPL-SCNC: 27 MMOL/L (ref 22–29)
COCAINE UR QL: NEGATIVE
CREAT BLD-MCNC: 0.68 MG/DL (ref 0.76–1.27)
CREAT BLD-MCNC: 0.76 MG/DL (ref 0.76–1.27)
DEPRECATED RDW RBC AUTO: 42.5 FL (ref 37–54)
EOSINOPHIL # BLD AUTO: 0.2 10*3/MM3 (ref 0–0.4)
EOSINOPHIL # BLD MANUAL: 0.26 10*3/MM3 (ref 0–0.4)
EOSINOPHIL NFR BLD AUTO: 1.6 % (ref 0.3–6.2)
EOSINOPHIL NFR BLD MANUAL: 2 % (ref 0.3–6.2)
ERYTHROCYTE [DISTWIDTH] IN BLOOD BY AUTOMATED COUNT: 14 % (ref 12.3–15.4)
ETHANOL BLD-MCNC: <10 MG/DL (ref 0–10)
ETHANOL UR QL: <0.01 %
GFR SERPL CREATININE-BSD FRML MDRD: 107 ML/MIN/1.73
GFR SERPL CREATININE-BSD FRML MDRD: 122 ML/MIN/1.73
GLOBULIN UR ELPH-MCNC: 2.2 GM/DL
GLUCOSE BLD-MCNC: 111 MG/DL (ref 65–99)
GLUCOSE BLD-MCNC: 120 MG/DL (ref 65–99)
GLUCOSE BLDC GLUCOMTR-MCNC: 107 MG/DL (ref 70–130)
HCT VFR BLD AUTO: 45.3 % (ref 37.5–51)
HGB BLD-MCNC: 15.2 G/DL (ref 13–17.7)
HOLD SPECIMEN: NORMAL
HOLD SPECIMEN: NORMAL
IMM GRANULOCYTES # BLD AUTO: 0.04 10*3/MM3 (ref 0–0.05)
IMM GRANULOCYTES NFR BLD AUTO: 0.3 % (ref 0–0.5)
LYMPHOCYTES # BLD AUTO: 5.31 10*3/MM3 (ref 0.7–3.1)
LYMPHOCYTES # BLD MANUAL: 5.03 10*3/MM3 (ref 0.7–3.1)
LYMPHOCYTES NFR BLD AUTO: 41.2 % (ref 19.6–45.3)
LYMPHOCYTES NFR BLD MANUAL: 39 % (ref 19.6–45.3)
LYMPHOCYTES NFR BLD MANUAL: 7 % (ref 5–12)
MAGNESIUM SERPL-MCNC: 2.1 MG/DL (ref 1.6–2.6)
MCH RBC QN AUTO: 28.2 PG (ref 26.6–33)
MCHC RBC AUTO-ENTMCNC: 33.6 G/DL (ref 31.5–35.7)
MCV RBC AUTO: 84 FL (ref 79–97)
METHADONE UR QL SCN: NEGATIVE
MONOCYTES # BLD AUTO: 0.9 10*3/MM3 (ref 0.1–0.9)
MONOCYTES # BLD AUTO: 1.03 10*3/MM3 (ref 0.1–0.9)
MONOCYTES NFR BLD AUTO: 8 % (ref 5–12)
NEUTROPHILS # BLD AUTO: 6.25 10*3/MM3 (ref 1.7–7)
NEUTROPHILS # BLD AUTO: 6.58 10*3/MM3 (ref 1.7–7)
NEUTROPHILS NFR BLD AUTO: 48.4 % (ref 42.7–76)
NEUTROPHILS NFR BLD MANUAL: 51 % (ref 42.7–76)
NRBC BLD AUTO-RTO: 0 /100 WBC (ref 0–0.2)
NT-PROBNP SERPL-MCNC: 39.6 PG/ML (ref 5–900)
OPIATES UR QL: NEGATIVE
OXYCODONE UR QL SCN: NEGATIVE
PCP UR QL SCN: NEGATIVE
PLATELET # BLD AUTO: 350 10*3/MM3 (ref 140–450)
PMV BLD AUTO: 8.9 FL (ref 6–12)
POTASSIUM BLD-SCNC: 3.3 MMOL/L (ref 3.5–5.2)
POTASSIUM BLD-SCNC: 4.1 MMOL/L (ref 3.5–5.2)
PROPOXYPH UR QL: NEGATIVE
PROT SERPL-MCNC: 6.9 G/DL (ref 6–8.5)
RBC # BLD AUTO: 5.39 10*6/MM3 (ref 4.14–5.8)
RBC MORPH BLD: NORMAL
SALICYLATES SERPL-MCNC: <0.3 MG/DL
SMALL PLATELETS BLD QL SMEAR: ADEQUATE
SODIUM BLD-SCNC: 140 MMOL/L (ref 136–145)
SODIUM BLD-SCNC: 141 MMOL/L (ref 136–145)
TRICYCLICS UR QL SCN: NEGATIVE
TROPONIN T SERPL-MCNC: 0.11 NG/ML (ref 0–0.03)
TROPONIN T SERPL-MCNC: 2.01 NG/ML (ref 0–0.03)
TROPONIN T SERPL-MCNC: <0.01 NG/ML (ref 0–0.03)
TSH SERPL DL<=0.05 MIU/L-ACNC: 1.22 UIU/ML (ref 0.27–4.2)
WBC MORPH BLD: NORMAL
WBC NRBC COR # BLD: 12.9 10*3/MM3 (ref 3.4–10.8)
WHOLE BLOOD HOLD SPECIMEN: NORMAL
WHOLE BLOOD HOLD SPECIMEN: NORMAL

## 2019-10-12 PROCEDURE — 82962 GLUCOSE BLOOD TEST: CPT

## 2019-10-12 PROCEDURE — 80307 DRUG TEST PRSMV CHEM ANLYZR: CPT | Performed by: FAMILY MEDICINE

## 2019-10-12 PROCEDURE — 84443 ASSAY THYROID STIM HORMONE: CPT | Performed by: FAMILY MEDICINE

## 2019-10-12 PROCEDURE — 25010000002 ENOXAPARIN PER 10 MG: Performed by: FAMILY MEDICINE

## 2019-10-12 PROCEDURE — 83735 ASSAY OF MAGNESIUM: CPT | Performed by: FAMILY MEDICINE

## 2019-10-12 PROCEDURE — 71045 X-RAY EXAM CHEST 1 VIEW: CPT

## 2019-10-12 PROCEDURE — 83880 ASSAY OF NATRIURETIC PEPTIDE: CPT | Performed by: FAMILY MEDICINE

## 2019-10-12 PROCEDURE — 36415 COLL VENOUS BLD VENIPUNCTURE: CPT | Performed by: INTERNAL MEDICINE

## 2019-10-12 PROCEDURE — 84484 ASSAY OF TROPONIN QUANT: CPT | Performed by: FAMILY MEDICINE

## 2019-10-12 PROCEDURE — 99284 EMERGENCY DEPT VISIT MOD MDM: CPT

## 2019-10-12 PROCEDURE — 70450 CT HEAD/BRAIN W/O DYE: CPT

## 2019-10-12 PROCEDURE — 93005 ELECTROCARDIOGRAM TRACING: CPT | Performed by: FAMILY MEDICINE

## 2019-10-12 PROCEDURE — 80306 DRUG TEST PRSMV INSTRMNT: CPT | Performed by: FAMILY MEDICINE

## 2019-10-12 PROCEDURE — 85025 COMPLETE CBC W/AUTO DIFF WBC: CPT | Performed by: FAMILY MEDICINE

## 2019-10-12 PROCEDURE — 25010000002 LORAZEPAM PER 2 MG: Performed by: FAMILY MEDICINE

## 2019-10-12 PROCEDURE — 93010 ELECTROCARDIOGRAM REPORT: CPT | Performed by: INTERNAL MEDICINE

## 2019-10-12 PROCEDURE — 80053 COMPREHEN METABOLIC PANEL: CPT | Performed by: FAMILY MEDICINE

## 2019-10-12 PROCEDURE — 72131 CT LUMBAR SPINE W/O DYE: CPT

## 2019-10-12 PROCEDURE — 85007 BL SMEAR W/DIFF WBC COUNT: CPT | Performed by: FAMILY MEDICINE

## 2019-10-12 PROCEDURE — 84484 ASSAY OF TROPONIN QUANT: CPT | Performed by: INTERNAL MEDICINE

## 2019-10-12 RX ORDER — ASPIRIN 81 MG/1
324 TABLET, CHEWABLE ORAL ONCE
Status: COMPLETED | OUTPATIENT
Start: 2019-10-12 | End: 2019-10-12

## 2019-10-12 RX ORDER — NALOXONE HYDROCHLORIDE 0.4 MG/ML
0.4 INJECTION, SOLUTION INTRAMUSCULAR; INTRAVENOUS; SUBCUTANEOUS
Status: DISCONTINUED | OUTPATIENT
Start: 2019-10-12 | End: 2019-10-15 | Stop reason: HOSPADM

## 2019-10-12 RX ORDER — NITROGLYCERIN 0.4 MG/1
0.4 TABLET SUBLINGUAL
Status: DISCONTINUED | OUTPATIENT
Start: 2019-10-12 | End: 2019-10-15 | Stop reason: HOSPADM

## 2019-10-12 RX ORDER — POTASSIUM CHLORIDE 750 MG/1
40 CAPSULE, EXTENDED RELEASE ORAL ONCE
Status: COMPLETED | OUTPATIENT
Start: 2019-10-12 | End: 2019-10-12

## 2019-10-12 RX ORDER — MORPHINE SULFATE 2 MG/ML
2 INJECTION, SOLUTION INTRAMUSCULAR; INTRAVENOUS EVERY 4 HOURS PRN
Status: DISCONTINUED | OUTPATIENT
Start: 2019-10-12 | End: 2019-10-15 | Stop reason: HOSPADM

## 2019-10-12 RX ORDER — CLOPIDOGREL BISULFATE 75 MG/1
300 TABLET ORAL ONCE
Status: COMPLETED | OUTPATIENT
Start: 2019-10-12 | End: 2019-10-12

## 2019-10-12 RX ORDER — LORAZEPAM 2 MG/ML
1 INJECTION INTRAMUSCULAR ONCE
Status: COMPLETED | OUTPATIENT
Start: 2019-10-12 | End: 2019-10-12

## 2019-10-12 RX ORDER — SODIUM CHLORIDE 0.9 % (FLUSH) 0.9 %
10 SYRINGE (ML) INJECTION AS NEEDED
Status: DISCONTINUED | OUTPATIENT
Start: 2019-10-12 | End: 2019-10-15 | Stop reason: HOSPADM

## 2019-10-12 RX ORDER — LISINOPRIL 2.5 MG/1
2.5 TABLET ORAL
Status: DISCONTINUED | OUTPATIENT
Start: 2019-10-12 | End: 2019-10-14

## 2019-10-12 RX ORDER — SODIUM CHLORIDE 0.9 % (FLUSH) 0.9 %
10 SYRINGE (ML) INJECTION EVERY 12 HOURS SCHEDULED
Status: DISCONTINUED | OUTPATIENT
Start: 2019-10-12 | End: 2019-10-15 | Stop reason: HOSPADM

## 2019-10-12 RX ORDER — LORAZEPAM 0.5 MG/1
1 TABLET ORAL EVERY 6 HOURS PRN
Status: DISCONTINUED | OUTPATIENT
Start: 2019-10-12 | End: 2019-10-15 | Stop reason: HOSPADM

## 2019-10-12 RX ORDER — ATORVASTATIN CALCIUM 20 MG/1
20 TABLET, FILM COATED ORAL NIGHTLY
Status: DISCONTINUED | OUTPATIENT
Start: 2019-10-12 | End: 2019-10-13

## 2019-10-12 RX ORDER — ONDANSETRON 2 MG/ML
4 INJECTION INTRAMUSCULAR; INTRAVENOUS EVERY 6 HOURS PRN
Status: DISCONTINUED | OUTPATIENT
Start: 2019-10-12 | End: 2019-10-15 | Stop reason: HOSPADM

## 2019-10-12 RX ADMIN — SODIUM CHLORIDE, PRESERVATIVE FREE 10 ML: 5 INJECTION INTRAVENOUS at 20:41

## 2019-10-12 RX ADMIN — LORAZEPAM 1 MG: 1 TABLET ORAL at 22:20

## 2019-10-12 RX ADMIN — CLOPIDOGREL BISULFATE 300 MG: 75 TABLET ORAL at 22:14

## 2019-10-12 RX ADMIN — ATORVASTATIN CALCIUM 20 MG: 20 TABLET, FILM COATED ORAL at 22:13

## 2019-10-12 RX ADMIN — ASPIRIN 81 MG 324 MG: 81 TABLET ORAL at 11:25

## 2019-10-12 RX ADMIN — ENOXAPARIN SODIUM 70 MG: 80 INJECTION SUBCUTANEOUS at 15:54

## 2019-10-12 RX ADMIN — LORAZEPAM 1 MG: 2 INJECTION INTRAMUSCULAR; INTRAVENOUS at 11:25

## 2019-10-12 RX ADMIN — METOPROLOL TARTRATE 12.5 MG: 25 TABLET ORAL at 22:15

## 2019-10-12 RX ADMIN — LORAZEPAM 1 MG: 2 INJECTION INTRAMUSCULAR; INTRAVENOUS at 13:22

## 2019-10-12 RX ADMIN — SODIUM CHLORIDE 1000 ML: 900 INJECTION, SOLUTION INTRAVENOUS at 12:01

## 2019-10-12 RX ADMIN — POTASSIUM CHLORIDE 40 MEQ: 750 CAPSULE, EXTENDED RELEASE ORAL at 12:01

## 2019-10-12 RX ADMIN — LISINOPRIL 2.5 MG: 2.5 TABLET ORAL at 22:15

## 2019-10-13 ENCOUNTER — PREP FOR SURGERY (OUTPATIENT)
Dept: OTHER | Facility: HOSPITAL | Age: 54
End: 2019-10-13

## 2019-10-13 ENCOUNTER — APPOINTMENT (OUTPATIENT)
Dept: CARDIOLOGY | Facility: HOSPITAL | Age: 54
End: 2019-10-13

## 2019-10-13 DIAGNOSIS — I21.4 NSTEMI (NON-ST ELEVATED MYOCARDIAL INFARCTION) (HCC): Primary | ICD-10-CM

## 2019-10-13 LAB
ANION GAP SERPL CALCULATED.3IONS-SCNC: 5 MMOL/L (ref 5–15)
BH CV ECHO MEAS - ACS: 1.5 CM
BH CV ECHO MEAS - AI DEC SLOPE: 225 CM/SEC^2
BH CV ECHO MEAS - AI MAX PG: 76 MMHG
BH CV ECHO MEAS - AI MAX VEL: 436 CM/SEC
BH CV ECHO MEAS - AI P1/2T: 567.6 MSEC
BH CV ECHO MEAS - AO ISTHMUS: 2.5 CM
BH CV ECHO MEAS - AO MAX PG (FULL): 7.1 MMHG
BH CV ECHO MEAS - AO MAX PG: 12 MMHG
BH CV ECHO MEAS - AO MEAN PG (FULL): 3 MMHG
BH CV ECHO MEAS - AO MEAN PG: 5 MMHG
BH CV ECHO MEAS - AO ROOT AREA (BSA CORRECTED): 1.6
BH CV ECHO MEAS - AO ROOT AREA: 6.6 CM^2
BH CV ECHO MEAS - AO ROOT DIAM: 2.9 CM
BH CV ECHO MEAS - AO V2 MAX: 173 CM/SEC
BH CV ECHO MEAS - AO V2 MEAN: 101 CM/SEC
BH CV ECHO MEAS - AO V2 VTI: 25.8 CM
BH CV ECHO MEAS - ASC AORTA: 3.8 CM
BH CV ECHO MEAS - AVA(I,A): 2.6 CM^2
BH CV ECHO MEAS - AVA(I,D): 2.6 CM^2
BH CV ECHO MEAS - AVA(V,A): 2.4 CM^2
BH CV ECHO MEAS - AVA(V,D): 2.4 CM^2
BH CV ECHO MEAS - BSA(HAYCOCK): 1.9 M^2
BH CV ECHO MEAS - BSA: 1.9 M^2
BH CV ECHO MEAS - BZI_BMI: 22.1 KILOGRAMS/M^2
BH CV ECHO MEAS - BZI_METRIC_HEIGHT: 177.8 CM
BH CV ECHO MEAS - BZI_METRIC_WEIGHT: 69.9 KG
BH CV ECHO MEAS - EDV(CUBED): 91.1 ML
BH CV ECHO MEAS - EDV(TEICH): 92.4 ML
BH CV ECHO MEAS - EF(CUBED): 79.8 %
BH CV ECHO MEAS - EF(TEICH): 72.4 %
BH CV ECHO MEAS - ESV(CUBED): 18.4 ML
BH CV ECHO MEAS - ESV(TEICH): 25.6 ML
BH CV ECHO MEAS - FS: 41.3 %
BH CV ECHO MEAS - IVS/LVPW: 0.78
BH CV ECHO MEAS - IVSD: 0.92 CM
BH CV ECHO MEAS - LA DIMENSION: 4.1 CM
BH CV ECHO MEAS - LA/AO: 1.4
BH CV ECHO MEAS - LV MASS(C)D: 165.5 GRAMS
BH CV ECHO MEAS - LV MASS(C)DI: 88.6 GRAMS/M^2
BH CV ECHO MEAS - LV MAX PG: 4.8 MMHG
BH CV ECHO MEAS - LV MEAN PG: 2 MMHG
BH CV ECHO MEAS - LV V1 MAX: 110 CM/SEC
BH CV ECHO MEAS - LV V1 MEAN: 70.8 CM/SEC
BH CV ECHO MEAS - LV V1 VTI: 17.6 CM
BH CV ECHO MEAS - LVIDD: 4.5 CM
BH CV ECHO MEAS - LVIDS: 2.6 CM
BH CV ECHO MEAS - LVOT AREA (M): 3.8 CM^2
BH CV ECHO MEAS - LVOT AREA: 3.8 CM^2
BH CV ECHO MEAS - LVOT DIAM: 2.2 CM
BH CV ECHO MEAS - LVPWD: 1.2 CM
BH CV ECHO MEAS - MV A MAX VEL: 34.9 CM/SEC
BH CV ECHO MEAS - MV DEC SLOPE: 757 CM/SEC^2
BH CV ECHO MEAS - MV E MAX VEL: 77.1 CM/SEC
BH CV ECHO MEAS - MV E/A: 2.2
BH CV ECHO MEAS - MV MAX PG: 2.3 MMHG
BH CV ECHO MEAS - MV MEAN PG: 1 MMHG
BH CV ECHO MEAS - MV P1/2T MAX VEL: 75.3 CM/SEC
BH CV ECHO MEAS - MV P1/2T: 29.1 MSEC
BH CV ECHO MEAS - MV V2 MAX: 75.3 CM/SEC
BH CV ECHO MEAS - MV V2 MEAN: 49.7 CM/SEC
BH CV ECHO MEAS - MV V2 VTI: 17.6 CM
BH CV ECHO MEAS - MVA P1/2T LCG: 2.9 CM^2
BH CV ECHO MEAS - MVA(P1/2T): 7.6 CM^2
BH CV ECHO MEAS - MVA(VTI): 3.8 CM^2
BH CV ECHO MEAS - PA MAX PG: 6.5 MMHG
BH CV ECHO MEAS - PA V2 MAX: 127 CM/SEC
BH CV ECHO MEAS - PI END-D VEL: 148 CM/SEC
BH CV ECHO MEAS - RVDD: 3.1 CM
BH CV ECHO MEAS - SI(AO): 91.2 ML/M^2
BH CV ECHO MEAS - SI(CUBED): 38.9 ML/M^2
BH CV ECHO MEAS - SI(LVOT): 35.8 ML/M^2
BH CV ECHO MEAS - SI(TEICH): 35.8 ML/M^2
BH CV ECHO MEAS - SV(AO): 170.4 ML
BH CV ECHO MEAS - SV(CUBED): 72.7 ML
BH CV ECHO MEAS - SV(LVOT): 66.9 ML
BH CV ECHO MEAS - SV(TEICH): 66.9 ML
BH CV ECHO MEAS - TR MAX VEL: 267 CM/SEC
BUN BLD-MCNC: 11 MG/DL (ref 6–20)
BUN/CREAT SERPL: 13.3 (ref 7–25)
CALCIUM SPEC-SCNC: 9.2 MG/DL (ref 8.6–10.5)
CHLORIDE SERPL-SCNC: 105 MMOL/L (ref 98–107)
CO2 SERPL-SCNC: 30 MMOL/L (ref 22–29)
CREAT BLD-MCNC: 0.83 MG/DL (ref 0.76–1.27)
GFR SERPL CREATININE-BSD FRML MDRD: 97 ML/MIN/1.73
GLUCOSE BLD-MCNC: 102 MG/DL (ref 65–99)
LV EF 2D ECHO EST: 68 %
MAXIMAL PREDICTED HEART RATE: 167 BPM
POTASSIUM BLD-SCNC: 4.4 MMOL/L (ref 3.5–5.2)
SODIUM BLD-SCNC: 140 MMOL/L (ref 136–145)
STRESS TARGET HR: 142 BPM
TROPONIN T SERPL-MCNC: 3.16 NG/ML (ref 0–0.03)
TROPONIN T SERPL-MCNC: 3.52 NG/ML (ref 0–0.03)

## 2019-10-13 PROCEDURE — 25010000002 ENOXAPARIN PER 10 MG: Performed by: INTERNAL MEDICINE

## 2019-10-13 PROCEDURE — 93306 TTE W/DOPPLER COMPLETE: CPT

## 2019-10-13 PROCEDURE — 84484 ASSAY OF TROPONIN QUANT: CPT | Performed by: INTERNAL MEDICINE

## 2019-10-13 PROCEDURE — 93005 ELECTROCARDIOGRAM TRACING: CPT | Performed by: INTERNAL MEDICINE

## 2019-10-13 PROCEDURE — 80048 BASIC METABOLIC PNL TOTAL CA: CPT | Performed by: INTERNAL MEDICINE

## 2019-10-13 PROCEDURE — 93010 ELECTROCARDIOGRAM REPORT: CPT | Performed by: INTERNAL MEDICINE

## 2019-10-13 PROCEDURE — 99255 IP/OBS CONSLTJ NEW/EST HI 80: CPT | Performed by: INTERNAL MEDICINE

## 2019-10-13 PROCEDURE — 93306 TTE W/DOPPLER COMPLETE: CPT | Performed by: INTERNAL MEDICINE

## 2019-10-13 RX ORDER — SODIUM CHLORIDE 9 MG/ML
125 INJECTION, SOLUTION INTRAVENOUS CONTINUOUS
Status: DISCONTINUED | OUTPATIENT
Start: 2019-10-14 | End: 2019-10-14

## 2019-10-13 RX ORDER — ATORVASTATIN CALCIUM 40 MG/1
40 TABLET, FILM COATED ORAL NIGHTLY
Status: DISCONTINUED | OUTPATIENT
Start: 2019-10-13 | End: 2019-10-15 | Stop reason: HOSPADM

## 2019-10-13 RX ORDER — ASPIRIN 81 MG/1
81 TABLET ORAL DAILY
Status: DISCONTINUED | OUTPATIENT
Start: 2019-10-13 | End: 2019-10-14

## 2019-10-13 RX ADMIN — SODIUM CHLORIDE, PRESERVATIVE FREE 10 ML: 5 INJECTION INTRAVENOUS at 20:52

## 2019-10-13 RX ADMIN — ATORVASTATIN CALCIUM 40 MG: 40 TABLET, FILM COATED ORAL at 20:50

## 2019-10-13 RX ADMIN — ENOXAPARIN SODIUM 70 MG: 80 INJECTION SUBCUTANEOUS at 10:52

## 2019-10-13 RX ADMIN — ASPIRIN 81 MG: 81 TABLET, COATED ORAL at 10:52

## 2019-10-13 RX ADMIN — SODIUM CHLORIDE, PRESERVATIVE FREE 10 ML: 5 INJECTION INTRAVENOUS at 08:23

## 2019-10-13 RX ADMIN — METOPROLOL TARTRATE 12.5 MG: 25 TABLET ORAL at 08:22

## 2019-10-13 RX ADMIN — LORAZEPAM 1 MG: 1 TABLET ORAL at 20:49

## 2019-10-13 RX ADMIN — METOPROLOL TARTRATE 12.5 MG: 25 TABLET ORAL at 20:50

## 2019-10-14 ENCOUNTER — APPOINTMENT (OUTPATIENT)
Dept: CT IMAGING | Facility: HOSPITAL | Age: 54
End: 2019-10-14

## 2019-10-14 LAB
ACT BLD: 146 SECONDS (ref 82–152)
ANION GAP SERPL CALCULATED.3IONS-SCNC: 8 MMOL/L (ref 5–15)
BUN BLD-MCNC: 12 MG/DL (ref 6–20)
BUN/CREAT SERPL: 15 (ref 7–25)
CALCIUM SPEC-SCNC: 9.4 MG/DL (ref 8.6–10.5)
CHLORIDE SERPL-SCNC: 105 MMOL/L (ref 98–107)
CO2 SERPL-SCNC: 28 MMOL/L (ref 22–29)
CREAT BLD-MCNC: 0.8 MG/DL (ref 0.76–1.27)
GFR SERPL CREATININE-BSD FRML MDRD: 101 ML/MIN/1.73
GLUCOSE BLD-MCNC: 97 MG/DL (ref 65–99)
POTASSIUM BLD-SCNC: 4.2 MMOL/L (ref 3.5–5.2)
SODIUM BLD-SCNC: 141 MMOL/L (ref 136–145)

## 2019-10-14 PROCEDURE — 25010000002 EPTIFIBATIDE 20 MG/10ML SOLUTION: Performed by: INTERNAL MEDICINE

## 2019-10-14 PROCEDURE — 0 IOPAMIDOL PER 1 ML: Performed by: INTERNAL MEDICINE

## 2019-10-14 PROCEDURE — C1725 CATH, TRANSLUMIN NON-LASER: HCPCS | Performed by: INTERNAL MEDICINE

## 2019-10-14 PROCEDURE — C1874 STENT, COATED/COV W/DEL SYS: HCPCS | Performed by: INTERNAL MEDICINE

## 2019-10-14 PROCEDURE — 71250 CT THORAX DX C-: CPT

## 2019-10-14 PROCEDURE — 80048 BASIC METABOLIC PNL TOTAL CA: CPT | Performed by: INTERNAL MEDICINE

## 2019-10-14 PROCEDURE — 92928 PRQ TCAT PLMT NTRAC ST 1 LES: CPT | Performed by: INTERNAL MEDICINE

## 2019-10-14 PROCEDURE — C1887 CATHETER, GUIDING: HCPCS | Performed by: INTERNAL MEDICINE

## 2019-10-14 PROCEDURE — 93454 CORONARY ARTERY ANGIO S&I: CPT | Performed by: INTERNAL MEDICINE

## 2019-10-14 PROCEDURE — 36415 COLL VENOUS BLD VENIPUNCTURE: CPT | Performed by: INTERNAL MEDICINE

## 2019-10-14 PROCEDURE — C1894 INTRO/SHEATH, NON-LASER: HCPCS | Performed by: INTERNAL MEDICINE

## 2019-10-14 PROCEDURE — 74176 CT ABD & PELVIS W/O CONTRAST: CPT

## 2019-10-14 PROCEDURE — 85347 COAGULATION TIME ACTIVATED: CPT

## 2019-10-14 PROCEDURE — 3E033PZ INTRODUCTION OF PLATELET INHIBITOR INTO PERIPHERAL VEIN, PERCUTANEOUS APPROACH: ICD-10-PCS | Performed by: INTERNAL MEDICINE

## 2019-10-14 PROCEDURE — C1769 GUIDE WIRE: HCPCS | Performed by: INTERNAL MEDICINE

## 2019-10-14 PROCEDURE — 25010000002 BIVALIRUDIN TRIFLUOROACETATE 250 MG RECONSTITUTED SOLUTION: Performed by: INTERNAL MEDICINE

## 2019-10-14 PROCEDURE — B2111ZZ FLUOROSCOPY OF MULTIPLE CORONARY ARTERIES USING LOW OSMOLAR CONTRAST: ICD-10-PCS | Performed by: INTERNAL MEDICINE

## 2019-10-14 PROCEDURE — 25010000002 BIVALIRUDIN TRIFLUOROACETATE 250 MG RECONSTITUTED SOLUTION 1 EACH VIAL: Performed by: INTERNAL MEDICINE

## 2019-10-14 PROCEDURE — 027135Z DILATION OF CORONARY ARTERY, TWO ARTERIES WITH TWO DRUG-ELUTING INTRALUMINAL DEVICES, PERCUTANEOUS APPROACH: ICD-10-PCS | Performed by: INTERNAL MEDICINE

## 2019-10-14 PROCEDURE — C9600 PERC DRUG-EL COR STENT SING: HCPCS | Performed by: INTERNAL MEDICINE

## 2019-10-14 PROCEDURE — 4A023N7 MEASUREMENT OF CARDIAC SAMPLING AND PRESSURE, LEFT HEART, PERCUTANEOUS APPROACH: ICD-10-PCS | Performed by: INTERNAL MEDICINE

## 2019-10-14 DEVICE — XIENCE SIERRA™ EVEROLIMUS ELUTING CORONARY STENT SYSTEM 2.75 MM X 18 MM / RAPID-EXCHANGE
Type: IMPLANTABLE DEVICE | Status: FUNCTIONAL
Brand: XIENCE SIERRA™

## 2019-10-14 DEVICE — XIENCE SIERRA™ EVEROLIMUS ELUTING CORONARY STENT SYSTEM 2.50 MM X 18 MM / RAPID-EXCHANGE
Type: IMPLANTABLE DEVICE | Status: FUNCTIONAL
Brand: XIENCE SIERRA™

## 2019-10-14 RX ORDER — NITROGLYCERIN 5 MG/ML
INJECTION, SOLUTION INTRAVENOUS AS NEEDED
Status: DISCONTINUED | OUTPATIENT
Start: 2019-10-14 | End: 2019-10-14 | Stop reason: HOSPADM

## 2019-10-14 RX ORDER — FAMOTIDINE 20 MG/1
20 TABLET, FILM COATED ORAL
Status: DISCONTINUED | OUTPATIENT
Start: 2019-10-14 | End: 2019-10-15 | Stop reason: HOSPADM

## 2019-10-14 RX ORDER — BIVALIRUDIN 250 MG/5ML
INJECTION, POWDER, LYOPHILIZED, FOR SOLUTION INTRAVENOUS AS NEEDED
Status: DISCONTINUED | OUTPATIENT
Start: 2019-10-14 | End: 2019-10-14 | Stop reason: HOSPADM

## 2019-10-14 RX ORDER — PRASUGREL 10 MG/1
10 TABLET, FILM COATED ORAL DAILY
Status: DISCONTINUED | OUTPATIENT
Start: 2019-10-15 | End: 2019-10-15 | Stop reason: HOSPADM

## 2019-10-14 RX ORDER — PRASUGREL 10 MG/1
TABLET, FILM COATED ORAL AS NEEDED
Status: DISCONTINUED | OUTPATIENT
Start: 2019-10-14 | End: 2019-10-14 | Stop reason: HOSPADM

## 2019-10-14 RX ORDER — SODIUM CHLORIDE 9 MG/ML
250 INJECTION, SOLUTION INTRAVENOUS ONCE AS NEEDED
Status: DISCONTINUED | OUTPATIENT
Start: 2019-10-14 | End: 2019-10-15 | Stop reason: HOSPADM

## 2019-10-14 RX ORDER — LIDOCAINE HYDROCHLORIDE 20 MG/ML
INJECTION, SOLUTION INFILTRATION; PERINEURAL AS NEEDED
Status: DISCONTINUED | OUTPATIENT
Start: 2019-10-14 | End: 2019-10-14 | Stop reason: HOSPADM

## 2019-10-14 RX ORDER — ASPIRIN 81 MG/1
81 TABLET ORAL DAILY
Status: DISCONTINUED | OUTPATIENT
Start: 2019-10-15 | End: 2019-10-15 | Stop reason: HOSPADM

## 2019-10-14 RX ORDER — EPTIFIBATIDE 2 MG/ML
INJECTION, SOLUTION INTRAVENOUS AS NEEDED
Status: DISCONTINUED | OUTPATIENT
Start: 2019-10-14 | End: 2019-10-14 | Stop reason: HOSPADM

## 2019-10-14 RX ORDER — SODIUM CHLORIDE 9 MG/ML
125 INJECTION, SOLUTION INTRAVENOUS CONTINUOUS
Status: DISPENSED | OUTPATIENT
Start: 2019-10-14 | End: 2019-10-15

## 2019-10-14 RX ORDER — NICOTINE 21 MG/24HR
1 PATCH, TRANSDERMAL 24 HOURS TRANSDERMAL EVERY 24 HOURS
Status: DISCONTINUED | OUTPATIENT
Start: 2019-10-14 | End: 2019-10-15 | Stop reason: HOSPADM

## 2019-10-14 RX ORDER — NITROGLYCERIN 20 MG/100ML
INJECTION INTRAVENOUS CONTINUOUS PRN
Status: COMPLETED | OUTPATIENT
Start: 2019-10-14 | End: 2019-10-14

## 2019-10-14 RX ADMIN — ATORVASTATIN CALCIUM 40 MG: 40 TABLET, FILM COATED ORAL at 21:45

## 2019-10-14 RX ADMIN — SODIUM CHLORIDE 125 ML/HR: 9 INJECTION, SOLUTION INTRAVENOUS at 05:46

## 2019-10-14 RX ADMIN — NICOTINE 1 PATCH: 21 PATCH TRANSDERMAL at 16:54

## 2019-10-14 RX ADMIN — METOPROLOL TARTRATE 12.5 MG: 25 TABLET ORAL at 21:46

## 2019-10-14 RX ADMIN — SODIUM CHLORIDE 125 ML/HR: 9 INJECTION, SOLUTION INTRAVENOUS at 15:19

## 2019-10-14 RX ADMIN — FAMOTIDINE 20 MG: 20 TABLET ORAL at 16:54

## 2019-10-14 RX ADMIN — SODIUM CHLORIDE, PRESERVATIVE FREE 10 ML: 5 INJECTION INTRAVENOUS at 21:46

## 2019-10-14 RX ADMIN — ASPIRIN 81 MG: 81 TABLET, COATED ORAL at 08:12

## 2019-10-14 RX ADMIN — METOPROLOL TARTRATE 12.5 MG: 25 TABLET ORAL at 08:12

## 2019-10-14 RX ADMIN — LISINOPRIL 2.5 MG: 2.5 TABLET ORAL at 08:12

## 2019-10-15 VITALS
SYSTOLIC BLOOD PRESSURE: 120 MMHG | RESPIRATION RATE: 18 BRPM | HEIGHT: 70 IN | HEART RATE: 96 BPM | DIASTOLIC BLOOD PRESSURE: 77 MMHG | WEIGHT: 152.1 LBS | TEMPERATURE: 98 F | OXYGEN SATURATION: 96 % | BODY MASS INDEX: 21.77 KG/M2

## 2019-10-15 PROBLEM — I21.4 NSTEMI (NON-ST ELEVATED MYOCARDIAL INFARCTION) (HCC): Status: RESOLVED | Noted: 2019-10-12 | Resolved: 2019-10-15

## 2019-10-15 LAB
ANION GAP SERPL CALCULATED.3IONS-SCNC: 9 MMOL/L (ref 5–15)
BUN BLD-MCNC: 12 MG/DL (ref 6–20)
BUN/CREAT SERPL: 16.9 (ref 7–25)
CALCIUM SPEC-SCNC: 9.3 MG/DL (ref 8.6–10.5)
CHLORIDE SERPL-SCNC: 105 MMOL/L (ref 98–107)
CHOLEST SERPL-MCNC: 182 MG/DL (ref 0–200)
CO2 SERPL-SCNC: 25 MMOL/L (ref 22–29)
CREAT BLD-MCNC: 0.71 MG/DL (ref 0.76–1.27)
DEPRECATED RDW RBC AUTO: 41.2 FL (ref 37–54)
ERYTHROCYTE [DISTWIDTH] IN BLOOD BY AUTOMATED COUNT: 13.8 % (ref 12.3–15.4)
GFR SERPL CREATININE-BSD FRML MDRD: 116 ML/MIN/1.73
GLUCOSE BLD-MCNC: 98 MG/DL (ref 65–99)
HBA1C MFR BLD: 5.4 % (ref 4.8–5.6)
HCT VFR BLD AUTO: 40.8 % (ref 37.5–51)
HDLC SERPL-MCNC: 42 MG/DL (ref 40–60)
HGB BLD-MCNC: 13.9 G/DL (ref 13–17.7)
LDLC SERPL CALC-MCNC: 119 MG/DL (ref 0–100)
LDLC/HDLC SERPL: 2.84 {RATIO}
MCH RBC QN AUTO: 28.3 PG (ref 26.6–33)
MCHC RBC AUTO-ENTMCNC: 34.1 G/DL (ref 31.5–35.7)
MCV RBC AUTO: 83.1 FL (ref 79–97)
PLATELET # BLD AUTO: 247 10*3/MM3 (ref 140–450)
PMV BLD AUTO: 9.1 FL (ref 6–12)
POTASSIUM BLD-SCNC: 3.8 MMOL/L (ref 3.5–5.2)
RBC # BLD AUTO: 4.91 10*6/MM3 (ref 4.14–5.8)
SODIUM BLD-SCNC: 139 MMOL/L (ref 136–145)
TRIGL SERPL-MCNC: 104 MG/DL (ref 0–150)
VLDLC SERPL-MCNC: 20.8 MG/DL
WBC NRBC COR # BLD: 8.08 10*3/MM3 (ref 3.4–10.8)

## 2019-10-15 PROCEDURE — 83036 HEMOGLOBIN GLYCOSYLATED A1C: CPT | Performed by: INTERNAL MEDICINE

## 2019-10-15 PROCEDURE — 80061 LIPID PANEL: CPT | Performed by: INTERNAL MEDICINE

## 2019-10-15 PROCEDURE — 99232 SBSQ HOSP IP/OBS MODERATE 35: CPT | Performed by: INTERNAL MEDICINE

## 2019-10-15 PROCEDURE — 80048 BASIC METABOLIC PNL TOTAL CA: CPT | Performed by: INTERNAL MEDICINE

## 2019-10-15 PROCEDURE — 85027 COMPLETE CBC AUTOMATED: CPT | Performed by: INTERNAL MEDICINE

## 2019-10-15 RX ORDER — PRASUGREL 10 MG/1
10 TABLET, FILM COATED ORAL DAILY
Qty: 30 TABLET | Refills: 1 | Status: SHIPPED | OUTPATIENT
Start: 2019-10-16 | End: 2019-12-17 | Stop reason: SDUPTHER

## 2019-10-15 RX ORDER — ASPIRIN 81 MG/1
81 TABLET ORAL DAILY
Qty: 30 TABLET | Refills: 1 | Status: SHIPPED | OUTPATIENT
Start: 2019-10-16 | End: 2019-12-17 | Stop reason: SDUPTHER

## 2019-10-15 RX ADMIN — ASPIRIN 81 MG: 81 TABLET, COATED ORAL at 08:36

## 2019-10-15 RX ADMIN — METOPROLOL TARTRATE 12.5 MG: 25 TABLET ORAL at 08:36

## 2019-10-15 RX ADMIN — PRASUGREL 10 MG: 10 TABLET, FILM COATED ORAL at 08:36

## 2019-10-15 RX ADMIN — FAMOTIDINE 20 MG: 20 TABLET ORAL at 06:30

## 2019-10-16 ENCOUNTER — READMISSION MANAGEMENT (OUTPATIENT)
Dept: CALL CENTER | Facility: HOSPITAL | Age: 54
End: 2019-10-16

## 2019-10-16 NOTE — OUTREACH NOTE
Prep Survey      Responses   Facility patient discharged from?  [facility]   Is patient eligible?  Yes   Discharge diagnosis  NSTEMI-left hear cath   Does the patient have one of the following disease processes/diagnoses(primary or secondary)?  Acute MI (STEMI,NSTEMI)   Does the patient have Home health ordered?  No   Is there a DME ordered?  No   Prep survey completed?  Yes          Deisy Garcia RN

## 2019-10-18 ENCOUNTER — READMISSION MANAGEMENT (OUTPATIENT)
Dept: CALL CENTER | Facility: HOSPITAL | Age: 54
End: 2019-10-18

## 2019-10-18 NOTE — OUTREACH NOTE
AMI Week 1 Survey      Responses   Facility patient discharged from?  [facility]   Does the patient have one of the following disease processes/diagnoses(primary or secondary)?  Acute MI (STEMI,NSTEMI)   Is there a successful TCM telephone encounter documented?  No   Week 1 attempt successful?  No   Unsuccessful attempts  Attempt 1          Haleigh Paredes RN

## 2019-10-21 ENCOUNTER — READMISSION MANAGEMENT (OUTPATIENT)
Dept: CALL CENTER | Facility: HOSPITAL | Age: 54
End: 2019-10-21

## 2019-10-21 NOTE — OUTREACH NOTE
AMI Week 1 Survey      Responses   Facility patient discharged from?  [place]   Does the patient have one of the following disease processes/diagnoses(primary or secondary)?  Acute MI (STEMI,NSTEMI)   Is there a successful TCM telephone encounter documented?  No   Week 1 attempt successful?  No   Unsuccessful attempts  Attempt 2          Asha Herrera RN

## 2019-10-23 ENCOUNTER — OFFICE VISIT (OUTPATIENT)
Dept: FAMILY MEDICINE CLINIC | Facility: CLINIC | Age: 54
End: 2019-10-23

## 2019-10-23 VITALS
WEIGHT: 156.2 LBS | HEIGHT: 70 IN | SYSTOLIC BLOOD PRESSURE: 96 MMHG | DIASTOLIC BLOOD PRESSURE: 68 MMHG | BODY MASS INDEX: 22.36 KG/M2

## 2019-10-23 DIAGNOSIS — Z09 HOSPITAL DISCHARGE FOLLOW-UP: ICD-10-CM

## 2019-10-23 DIAGNOSIS — Z72.0 NICOTINE ABUSE: ICD-10-CM

## 2019-10-23 DIAGNOSIS — I21.4 NSTEMI (NON-ST ELEVATED MYOCARDIAL INFARCTION) (HCC): Primary | ICD-10-CM

## 2019-10-23 DIAGNOSIS — Z23 FLU VACCINE NEED: ICD-10-CM

## 2019-10-23 PROCEDURE — 99213 OFFICE O/P EST LOW 20 MIN: CPT | Performed by: NURSE PRACTITIONER

## 2019-10-23 PROCEDURE — 90471 IMMUNIZATION ADMIN: CPT | Performed by: NURSE PRACTITIONER

## 2019-10-23 PROCEDURE — 90674 CCIIV4 VAC NO PRSV 0.5 ML IM: CPT | Performed by: NURSE PRACTITIONER

## 2019-10-23 RX ORDER — NICOTINE 21 MG/24HR
1 PATCH, TRANSDERMAL 24 HOURS TRANSDERMAL EVERY 24 HOURS
Qty: 30 PATCH | Refills: 0 | Status: SHIPPED | OUTPATIENT
Start: 2019-10-23 | End: 2019-12-05

## 2019-10-23 RX ORDER — CLOBETASOL PROPIONATE 0.5 MG/G
OINTMENT TOPICAL
COMMUNITY
End: 2019-10-23 | Stop reason: SDUPTHER

## 2019-10-23 RX ORDER — TACROLIMUS 1 MG/G
OINTMENT TOPICAL
COMMUNITY
End: 2019-10-23

## 2019-10-23 RX ORDER — CLOBETASOL PROPIONATE 0.5 MG/G
OINTMENT TOPICAL 2 TIMES DAILY
Qty: 45 G | Refills: 2 | Status: SHIPPED | OUTPATIENT
Start: 2019-10-23 | End: 2020-08-14

## 2019-10-23 NOTE — PROGRESS NOTES
Subjective   Tre Nielsen is a 54 y.o. male.  One week hospital discharge follow-up.  Had two cardiacstents placed during admission     Date of Admission: 10/12/2019  Date of Discharge:  10/15/2019  Primary Care Physician: Danisha Segundo APRN     Presenting Problem/History of Present Illness:  Muscle twitching [R25.3]  NSTEMI (non-ST elevated myocardial infarction) (CMS/[facility]) [I21.4]               Final Discharge Diagnoses:  NSTEMI  S/p PCI left circumlex  S/p PCI mid ramus    Heart Problem   Associated symptoms include arthralgias. Pertinent negatives include no chest pain, chills, diaphoresis, fatigue or fever.   Nicotine Dependence   Presents for follow-up visit. Symptoms are negative for fatigue. His urge triggers include company of smokers. The symptoms have been stable. His first smoke is from 8 to 10 AM. He smokes 1 pack of cigarettes per day. Compliance with prior treatments has been variable.        The following portions of the patient's history were reviewed and updated as appropriate:   Current Outpatient Medications   Medication Sig Dispense Refill   • aspirin 81 MG EC tablet Take 1 tablet by mouth Daily. 30 tablet 1   • clobetasol (TEMOVATE) 0.05 % ointment Apply  topically to the appropriate area as directed 2 (Two) Times a Day. 45 g 2   • methocarbamol (ROBAXIN) 750 MG tablet TAKE 1 TABLET BY MOUTH THREE TIMES DAILY AS NEEDED FOR MUSCLE SPASMS 90 tablet 0   • metoprolol tartrate (LOPRESSOR) 25 MG tablet Take 0.5 tablets by mouth Every 12 (Twelve) Hours. 60 tablet 1   • prasugrel (EFFIENT) 10 MG tablet Take 1 tablet by mouth Daily. 30 tablet 1   • simvastatin (ZOCOR) 20 MG tablet Take 1 tablet by mouth Every Night. 30 tablet 5   • nicotine (NICODERM CQ) 21 MG/24HR patch Place 1 patch on the skin as directed by provider Daily. 30 patch 0     No current facility-administered medications for this visit.      Current Outpatient Medications on File Prior to Visit   Medication Sig   • aspirin 81 MG EC  "tablet Take 1 tablet by mouth Daily.   • methocarbamol (ROBAXIN) 750 MG tablet TAKE 1 TABLET BY MOUTH THREE TIMES DAILY AS NEEDED FOR MUSCLE SPASMS   • metoprolol tartrate (LOPRESSOR) 25 MG tablet Take 0.5 tablets by mouth Every 12 (Twelve) Hours.   • prasugrel (EFFIENT) 10 MG tablet Take 1 tablet by mouth Daily.   • simvastatin (ZOCOR) 20 MG tablet Take 1 tablet by mouth Every Night.   • [DISCONTINUED] clobetasol (TEMOVATE) 0.05 % ointment clobetasol 0.05 % topical ointment   • [DISCONTINUED] betamethasone valerate (VALISONE) 0.1 % ointment APPLY EXTERNALLY TO THE AFFECTED AREA TWICE DAILY AS NEEDED   • [DISCONTINUED] HYDROcodone-acetaminophen (NORCO) 5-325 MG per tablet TK 1 T PO Q 4 H PRN FOR PAIN   • [DISCONTINUED] tacrolimus (PROTOPIC) 0.1 % ointment tacrolimus 0.1 % topical ointment   APPLY A THIN LAYER TO THE AFFECTED AREAS BY TOPICAL ROUTE 2 TIMES PER DAY ; RUB IN GENTLY AND COMPLETELY. FOR HAND ONLY.     No current facility-administered medications on file prior to visit.      He has No Known Allergies..    Review of Systems   Constitutional: Negative.  Negative for chills, diaphoresis, fatigue and fever.   Respiratory: Negative.  Negative for chest tightness and shortness of breath.    Cardiovascular: Negative.  Negative for chest pain.   Gastrointestinal: Negative.    Genitourinary: Negative.    Musculoskeletal: Positive for arthralgias and back pain.   Skin: Negative.    Neurological: Negative.    Psychiatric/Behavioral: Negative.  Negative for confusion.       Objective    Visit Vitals  BP 96/68   Ht 177.8 cm (70\")   Wt 70.9 kg (156 lb 3.2 oz)   BMI 22.41 kg/m²       Physical Exam   Constitutional: He is oriented to person, place, and time. He appears well-developed and well-nourished.   HENT:   Head: Normocephalic.   Right Ear: External ear normal.   Left Ear: External ear normal.   Eyes: EOM are normal. Pupils are equal, round, and reactive to light.   Neck: Normal range of motion. Neck supple. "   Cardiovascular: Normal rate, regular rhythm and normal heart sounds.   Pulmonary/Chest: Effort normal and breath sounds normal.   Abdominal: Soft. Bowel sounds are normal.   Musculoskeletal: Normal range of motion.   Lumbar support in place   Neurological: He is alert and oriented to person, place, and time.   Skin: Skin is warm. Capillary refill takes less than 2 seconds.   Psychiatric: He has a normal mood and affect. His behavior is normal.   Nursing note and vitals reviewed.      Assessment/Plan   Problems Addressed this Visit        Cardiovascular and Mediastinum    RESOLVED: NSTEMI (non-ST elevated myocardial infarction) (CMS/[facility]) - Primary      Other Visit Diagnoses     Nicotine abuse        Relevant Medications    nicotine (NICODERM CQ) 21 MG/24HR patch    Hospital discharge follow-up        Flu vaccine need        Relevant Orders    Flucelvax Quad=>4Years ([id number]) (Completed)        New Medications Ordered This Visit   Medications   • clobetasol (TEMOVATE) 0.05 % ointment     Sig: Apply  topically to the appropriate area as directed 2 (Two) Times a Day.     Dispense:  45 g     Refill:  2   • nicotine (NICODERM CQ) 21 MG/24HR patch     Sig: Place 1 patch on the skin as directed by provider Daily.     Dispense:  30 patch     Refill:  0     1.  NSTEMI:  Resolved  Continue on baby aspirin and Effient as previously prescribed  Encouraged to continue upcoming scheduled appointment Dr. Gary on November 27, 2019 for further cardiology evaluation  Encouraged to seek emergency medical treatment for any new or worsening chest pain, palpitations or shortness of breath    2.  Nicotine abuse:  Begin NicoDerm CQ as prescribed  Educated on importance of rotating placement of patch to reduce skin irritation  Educated on importance of not smoking while wearing patch as this may increase risk for nicotine overdose  Thoroughly educated not to apply more than 1 patch every 24 hours    3.  Hospital discharge  follow-up:  Current outpatient and discharge medications have been reconciled for the patient.  Reviewed by: LINDA Milan     4.  Flu vaccine need:  Influenza vaccine given IM in office  Educated on possible side effects of this medication including but not limited to pain, swelling and redness of injection site as well as flulike symptoms    Continue on current medications as previously prescribed   I spent minutes in direct face to face contact with patient.  Greater than 50% of this time was spent counseling patient and discussing plan of care.  Return if symptoms worsen or fail to improve, for Next scheduled follow up.        This document has been electronically signed by LINDA Milan on October 23, 2019 1:13 PM

## 2019-10-24 ENCOUNTER — READMISSION MANAGEMENT (OUTPATIENT)
Dept: CALL CENTER | Facility: HOSPITAL | Age: 54
End: 2019-10-24

## 2019-10-24 NOTE — OUTREACH NOTE
AMI Week 2 Survey      Responses   Facility patient discharged from?  [place]   Does the patient have one of the following disease processes/diagnoses(primary or secondary)?  Acute MI (STEMI,NSTEMI)   Week 2 attempt successful?  Yes   Call start time  1732   Call end time  1738   Discharge diagnosis  NSTEMI-left hear cath   Meds reviewed with patient/caregiver?  Yes   Is the patient having any side effects they believe may be caused by any medication additions or changes?  No   Does the patient have all prescriptions related to this admission filled (includes statins,anticoagulants,HTN meds,anti-arrhythmia meds)  Yes   Is the patient taking all medications as directed (includes completed medication regime)?  Yes   Does the patient have a primary care provider?   Yes   Does the patient have an appointment with their PCP,cardiologist,or clinic within 7 days of discharge?  Yes   Comments regarding PCP  PCP appt 10/23/19   Has the patient kept scheduled appointments due by today?  Yes   Has home health visited the patient within 72 hours of discharge?  N/A   Psychosocial issues?  No   Did the patient receive a copy of their discharge instructions?  Yes   Nursing interventions  Reviewed instructions with patient   What is the patient's perception of their health status since discharge?  Improving   Nursing interventions  Nurse provided patient education   Is the patient/caregiver able to teach back signs and symptoms of when to call for help immediately:  Sudden chest discomfort, Sudden discomfort in arms, back, neck or jaw, Shortness of breath at any time, Sudden sweating or clammy skin, Nausea or vomiting, Dizziness or lightheadedness, Irregular or rapid heart rate   Nursing interventions  Nurse provided patient education   Is the pateint /caregiver able to teach back the importance of cardiac rehab?  Yes   Nursing interventions  Provided education on importance of cardiac rehab   Is the patient/caregiver able to  teach back lifestyle changes to help prevent MIs  Regular exercise as approved by provider, Heart healthy diet, Reducing stress, Maintaining a healthy weight   Is the patient/caregiver able to teach back ways to prevent a second heart attack:  Take medications, Follow up with MD   Is the patient/caregiver able to teach back the hierarchy of who to call/visit for symptoms/problems? PCP, Specialist, Home health nurse, Urgent Care, ED, 911  Yes   Week 2 call completed?  Yes          Andres Sweeney RN

## 2019-10-31 ENCOUNTER — READMISSION MANAGEMENT (OUTPATIENT)
Dept: CALL CENTER | Facility: HOSPITAL | Age: 54
End: 2019-10-31

## 2019-10-31 NOTE — OUTREACH NOTE
AMI Week 3 Survey      Responses   Facility patient discharged from?  [facility]   Does the patient have one of the following disease processes/diagnoses(primary or secondary)?  Acute MI (STEMI,NSTEMI)   Week 3 attempt successful?  No   Unsuccessful attempts  Attempt 1          Melany Hearn RN

## 2019-11-04 ENCOUNTER — READMISSION MANAGEMENT (OUTPATIENT)
Dept: CALL CENTER | Facility: HOSPITAL | Age: 54
End: 2019-11-04

## 2019-11-04 DIAGNOSIS — M54.50 CHRONIC BILATERAL LOW BACK PAIN WITHOUT SCIATICA: ICD-10-CM

## 2019-11-04 DIAGNOSIS — G89.29 CHRONIC BILATERAL LOW BACK PAIN WITHOUT SCIATICA: ICD-10-CM

## 2019-11-04 RX ORDER — METHOCARBAMOL 750 MG/1
TABLET, FILM COATED ORAL
Qty: 90 TABLET | Refills: 0 | Status: SHIPPED | OUTPATIENT
Start: 2019-11-04 | End: 2019-12-04 | Stop reason: SDUPTHER

## 2019-11-04 NOTE — OUTREACH NOTE
AMI Week 3 Survey      Responses   Facility patient discharged from?  [facility]   Does the patient have one of the following disease processes/diagnoses(primary or secondary)?  Acute MI (STEMI,NSTEMI)   Week 3 attempt successful?  Yes   Call start time  1320   Call end time  1321   Discharge diagnosis  NSTEMI-left hear cath   Meds reviewed with patient/caregiver?  Yes   Is the patient taking all medications as directed (includes completed medication regime)?  Yes   Has the patient kept scheduled appointments due by today?  Yes   Psychosocial issues?  No   What is the patient's perception of their health status since discharge?  Improving   Is the patient/caregiver able to teach back signs and symptoms of when to call for help immediately:  Sudden chest discomfort, Sudden discomfort in arms, back, neck or jaw, Shortness of breath at any time, Sudden sweating or clammy skin, Nausea or vomiting   Is the patient/caregiver able to teach back lifestyle changes to help prevent MIs  Quit smoking, Heart healthy diet, Managing diabetes   Is the patient/caregiver able to teach back ways to prevent a second heart attack:  Take medications   Is the patient/caregiver able to teach back the hierarchy of who to call/visit for symptoms/problems? PCP, Specialist, Home health nurse, Urgent Care, ED, 911  Yes   Week 3 call completed?  Yes   Revoked  No further contact(revokes)-requires comment   Graduated/Revoked comments  pt reports he is doing well no further needs          Asha Herrera RN

## 2019-11-26 ENCOUNTER — OFFICE VISIT (OUTPATIENT)
Dept: FAMILY MEDICINE CLINIC | Facility: CLINIC | Age: 54
End: 2019-11-26

## 2019-11-26 ENCOUNTER — RESULTS ENCOUNTER (OUTPATIENT)
Dept: FAMILY MEDICINE CLINIC | Facility: CLINIC | Age: 54
End: 2019-11-26

## 2019-11-26 VITALS
HEIGHT: 70 IN | WEIGHT: 155.4 LBS | SYSTOLIC BLOOD PRESSURE: 106 MMHG | BODY MASS INDEX: 22.25 KG/M2 | DIASTOLIC BLOOD PRESSURE: 74 MMHG

## 2019-11-26 DIAGNOSIS — G89.29 CHRONIC BILATERAL LOW BACK PAIN WITHOUT SCIATICA: ICD-10-CM

## 2019-11-26 DIAGNOSIS — F17.200 TOBACCO DEPENDENCE SYNDROME: ICD-10-CM

## 2019-11-26 DIAGNOSIS — E78.2 MIXED HYPERLIPIDEMIA: Primary | ICD-10-CM

## 2019-11-26 DIAGNOSIS — Z12.11 SCREENING FOR COLON CANCER: ICD-10-CM

## 2019-11-26 DIAGNOSIS — M54.50 CHRONIC BILATERAL LOW BACK PAIN WITHOUT SCIATICA: ICD-10-CM

## 2019-11-26 PROCEDURE — 99213 OFFICE O/P EST LOW 20 MIN: CPT | Performed by: NURSE PRACTITIONER

## 2019-11-27 ENCOUNTER — OFFICE VISIT (OUTPATIENT)
Dept: CARDIOLOGY | Facility: CLINIC | Age: 54
End: 2019-11-27

## 2019-11-27 VITALS
WEIGHT: 156 LBS | OXYGEN SATURATION: 98 % | HEART RATE: 72 BPM | BODY MASS INDEX: 22.33 KG/M2 | SYSTOLIC BLOOD PRESSURE: 100 MMHG | HEIGHT: 70 IN | DIASTOLIC BLOOD PRESSURE: 70 MMHG

## 2019-11-27 DIAGNOSIS — E78.2 MIXED HYPERLIPIDEMIA: ICD-10-CM

## 2019-11-27 DIAGNOSIS — I71.21 ASCENDING AORTIC ANEURYSM (HCC): ICD-10-CM

## 2019-11-27 DIAGNOSIS — I25.10 CORONARY ARTERY DISEASE INVOLVING NATIVE CORONARY ARTERY OF NATIVE HEART WITHOUT ANGINA PECTORIS: ICD-10-CM

## 2019-11-27 DIAGNOSIS — Q23.1 BICUSPID AORTIC VALVE: ICD-10-CM

## 2019-11-27 DIAGNOSIS — F17.200 TOBACCO DEPENDENCE SYNDROME: Primary | ICD-10-CM

## 2019-11-27 DIAGNOSIS — Q23.1 BICUSPID AORTIC VALVE: Primary | ICD-10-CM

## 2019-11-27 PROCEDURE — 99214 OFFICE O/P EST MOD 30 MIN: CPT | Performed by: INTERNAL MEDICINE

## 2019-11-27 RX ORDER — SODIUM CHLORIDE 9 MG/ML
50 INJECTION, SOLUTION INTRAVENOUS CONTINUOUS
Status: CANCELLED | OUTPATIENT
Start: 2019-11-27

## 2019-11-27 NOTE — PROGRESS NOTES
Tre Nielsen  54 y.o. male    11/27/2019  1. Tobacco dependence syndrome    2. Coronary artery disease involving native coronary artery of native heart without angina pectoris    3. Mixed hyperlipidemia    4. Ascending aortic aneurysm (CMS/HCC)    5. Bicuspid aortic valve        History of Present Illness  Mr. Nielsen is a 54-year-old  male with multiple risk factors for coronary artery disease including tobacco use, hyperlipidemia, positive family history for CAD who has presented with a rather unusual combination of vague chest discomfort, nausea, back pain associated with jerky movements in the left upper and lower extremities. EKG showed no acute ST-T wave changes but cardiac enzymes were elevated suggesting a non-ST segment elevation myocardial infarction.  Catheterization was hence recommended.      Procedures Performed:  1. Coronary Angiogram  2.  PCI to 99% stenosis in the proximal/mid Left Circumflex coronary artery  3. PCI to eccentric 80% stenosis in the mid Ramus Intermedius coronary artery.     Procedure Details  The risks, benefits, complications, treatment options, and expected outcomes were discussed with the patient. The patient and/or family concurred with the proposed plan, giving informed consent. Patient was brought to the cath lab after IV hydration was begun and oral premedication was given.    He was prepped and draped in the usual manner. Using the modified Seldinger access technique, a 6 Liberian sheath was placed in the right common femoral artery under ultrasound guidance. Diagnostic catheterization was performed using 6 Liberian FL 5, FR 4 catheters. Standard images of the coronary arteries were first obtained followed by PCI to proximal/mid left circumflex coronary artery followed by PCI to mid ramus intermedius coronary artery.      Vascular Access: Puncture site was in the common femoral artery above the bifurcation.     Findings:  Left main coronary artery: This vessel was  engaged using a F L5 catheter.  The ascending aorta appeared to be dilated/aneurysmal.  Left main coronary artery was a patent vessel and this divided into a left anterior descending coronary artery, ramus intermedius coronary artery and left circumflex coronary artery.  Left anterior descending coronary artery: This was a medium size patent vessel which is widely patent.  Diagonal 1 coronary artery was a medium sized vessel which was patent.  Diagonal 2 was a smaller vessel.  Distal LAD wrapped around the apex of the heart.  Ramus intermedius coronary artery: This was a medium to large caliber tortuous vessel and there was an eccentric 80% stenosis followed by an ectatic segment in the midsegment of the vessel.  Distally the ramus gave rise to multiple subbranches which were patent.  Left circumflex coronary artery: This was a medium sized vessel with a long area of 90+%  stenosis the proximal/mid segment.   Right coronary artery: This is a medium sized, dominant, tortuous vessel with minor luminal irregularities noted in the distal segment.  No flow-limiting lesions identified.     Interventions: PCI to >90+% lesion in the Left Circumflex Coronary Artery:  The patient was given Integrilin bolus and Angiomax as per protocol and a 6 Greek XB LAD 4 guide was advanced to the ostium of the left main coronary artery.  A 0.014 BMW wire was then advanced into the circumflex vessel past the lesion to the distal part of the vessel.  The lesion was predilated using a 2.0 x 12 mm Trek balloon followed by placement of a 2.5 x 18 mm Xience Breann stent which was deployed at nominal pressures.  Subsequently a 2.5 x 12 mm NC Trek balloon used to post dilate the stent to 18 breann.  Final result was excellent with a residual of 0%.  400 mcg of intracoronary nitroglycerin was used postprocedure.     PCI to 80% lesion in the Mid Ramus Intermedius Coronary Artery:  The 0.014 BMW wire was then redirected into the tortuous ramus  intermedius coronary artery past the lesion into the distal part of the vessel. Lesion was predilated using a 2.0 x 15 mm Mini Trek balloon. Subsequently a 2.75 x 18 mm Breann stent was appropriately positioned to cover the length of the lesion and deployed at 13 breann.  Final result was excellent with a residual of 0%.  400 mcg of intracoronary nitroglycerin was used postprocedure.  There was CHRISTA-3 flow through the vessel.     Impression: >90+% lesion noted in the left circumflex coronary artery and successful PCI with placement of a 2.5 x 18 mm Xience Breann stent and reduction stenosis to 0%.  80% lesion noted in the mid ramus intermedius coronary artery and successful PCI with placement of a 2.75 x 18 mm Xience Breann stent and reduction stenosis 0%  Left anterior descending coronary artery and right coronary artery were widely patent with minor luminal irregularities.  Left ventriculogram was not performed.  Ascending aorta was ectatic/aneurysmal and to further delineate this a CT of the chest/aorta will be arranged.    · The study is technically difficult for diagnosis.  · Estimated EF = 68%.  · Left ventricular systolic function is normal.  · Right ventricular cavity is mildly dilated.  · Left atrial cavity size is mildly dilated.  · There is calcification of the aortic valve. Not well seen. Cannot exclude Bicuspid AV.  · Mild aortic valve regurgitation is present.    CT FINDINGS: Heart size normal. No evidence of pathologically  enlarged nodes. Subtle fibrotic changes at both lung bases. Lungs  otherwise clear.      Dilated ascending aorta 4.71 cm series 2 image 54. Normal caliber  aortic arch 2.66 cm series 2 image 33. Normal descending aorta.  Normal caliber abdominal aorta at 1.8 cm series 2 image 123.      The patient has done quite well since the procedure and denied any chest pain or shortness of breath.  As described above he possibly has a bicuspid aortic valve with ascending aortic aneurysm measuring  up to 4.71 cm.  To further evaluate this a transesophageal echocardiogram would be appropriate.    SUBJECTIVE    No Known Allergies      Past Medical History:   Diagnosis Date   • Candidiasis of skin and nails 02/03/2016   • Chronic pain disorder    • Depression    • Dyslipidemia 07/28/2015   • Joint pain 02/03/2016    RIGHT SHOULDER   • Low back pain 05/10/2016   • Muscle weakness 12/07/2015   • Pure hypercholesterolemia 07/28/2015   • Tobacco dependence syndrome 06/09/2015         Past Surgical History:   Procedure Laterality Date   • CARDIAC CATHETERIZATION N/A 10/14/2019    Procedure: Left Heart Cath/ PCI  if indicated;  Surgeon: Tobi Gary MD;  Location: [facility] INVASIVE LOCATION;  Service: Cardiovascular         Family History   Problem Relation Age of Onset   • Diabetes Mother    • Migraines Sister    • Diabetes Maternal Grandmother          Social History     Socioeconomic History   • Marital status:      Spouse name: Not on file   • Number of children: Not on file   • Years of education: Not on file   • Highest education level: Not on file   Tobacco Use   • Smoking status: Current Every Day Smoker     Packs/day: 1.00     Types: Cigarettes   • Smokeless tobacco: Never Used   Substance and Sexual Activity   • Alcohol use: Yes   • Drug use: Yes     Types: Marijuana   • Sexual activity: Defer         Current Outpatient Medications   Medication Sig Dispense Refill   • aspirin 81 MG EC tablet Take 1 tablet by mouth Daily. 30 tablet 1   • clobetasol (TEMOVATE) 0.05 % ointment Apply  topically to the appropriate area as directed 2 (Two) Times a Day. 45 g 2   • methocarbamol (ROBAXIN) 750 MG tablet TAKE 1 TABLET BY MOUTH THREE TIMES DAILY AS NEEDED FOR MUSCLE SPASMS 90 tablet 0   • metoprolol tartrate (LOPRESSOR) 25 MG tablet Take 0.5 tablets by mouth Every 12 (Twelve) Hours. 60 tablet 1   • prasugrel (EFFIENT) 10 MG tablet Take 1 tablet by mouth Daily. 30 tablet 1   • simvastatin (ZOCOR)  "20 MG tablet Take 1 tablet by mouth Every Night. 30 tablet 5   • nicotine (NICODERM CQ) 21 MG/24HR patch Place 1 patch on the skin as directed by provider Daily. 30 patch 0     No current facility-administered medications for this visit.          OBJECTIVE    /70   Pulse 72   Ht 177.8 cm (70\")   Wt 70.8 kg (156 lb)   SpO2 98%   BMI 22.38 kg/m²         Review of Systems     Constitutional:  Denies recent weight loss, weight gain, fever or chills, no change in exercise tolerance     HENT:  Denies any hearing loss, epistaxis, hoarseness, or difficulty speaking.     Eyes: Wears eyeglasses or contact lenses     Respiratory:  Denies dyspnea with exertion,no cough, wheezing, or hemoptysis.     Cardiovascular: Negative for palpations, chest pain, orthopnea, PND, peripheral edema, syncope, or claudication.     Gastrointestinal:  Denies change in bowel habits, dyspepsia, ulcer disease, hematochezia, or melena.     Endocrine: Negative for cold intolerance, heat intolerance, polydipsia, polyphagia and polyuria.     Genitourinary: Negative.      Musculoskeletal: DJD    Skin:  Denies any change in hair or nails, rashes, or skin lesions.     Allergic/Immunologic: Negative.  Negative for environmental allergies, food allergies and immunocompromised state.     Neurological:  Denies any history of recurrent headaches, strokes, TIA, or seizure disorder.     Hematological: Denies any food allergies, seasonal allergies, bleeding disorders, or lymphadenopathy.     Psychiatric/Behavioral: Denies any history of depression, substance abuse, or change in cognitive function.         Physical Exam     Constitutional: Cooperative, alert and oriented,  in no acute distress.     HENT:   Head: Normocephalic, normal hair patterns, no masses or tenderness.  Ears, Nose, and Throat: No gross abnormalities. No pallor or cyanosis. Dentition good.   Eyes: EOMS intact, PERRL, conjunctivae and lids unremarkable. Fundoscopic exam and visual " fields not performed.   Neck: No palpable masses or adenopathy, no thyromegaly, no JVD, carotid pulses are full and equal bilaterally and without  Bruits.     Cardiovascular: Regular rhythm, S1 and S2 normal, no S3 or S4.  No murmurs, gallops, or rubs detected.     Pulmonary/Chest: Chest: normal symmetry,  normal respiratory excursion, no intercostal retraction, no use of accessory muscles.            Pulmonary: Normal breath sounds. No rales or ronchi.    Abdominal: Abdomen soft, bowel sounds normoactive, no masses, no hepatosplenomegaly, non-tender, no bruits.     Musculoskeletal: No deformities, clubbing, cyanosis, erythema, or edema observed.     Neurological: No gross motor or sensory deficits noted, affect appropriate, oriented to time, person, place.     Skin: Warm and dry to the touch, no apparent skin lesions or masses noted.     Psychiatric: He has a normal mood and affect. His behavior is normal. Judgment and thought content normal.         Procedures      Lab Results   Component Value Date    WBC 8.08 10/15/2019    HGB 13.9 10/15/2019    HCT 40.8 10/15/2019    MCV 83.1 10/15/2019     10/15/2019     Lab Results   Component Value Date    GLUCOSE 98 10/15/2019    BUN 12 10/15/2019    CREATININE 0.71 (L) 10/15/2019    EGFRIFNONA 116 10/15/2019    BCR 16.9 10/15/2019    CO2 25.0 10/15/2019    CALCIUM 9.3 10/15/2019    ALBUMIN 4.70 10/12/2019    AST 33 10/12/2019    ALT 24 10/12/2019     Lab Results   Component Value Date    CHOL 182 10/15/2019    CHOL 224 (H) 05/29/2019    CHOL 255 (H) 11/30/2018     Lab Results   Component Value Date    TRIG 104 10/15/2019    TRIG 109 05/29/2019    TRIG 103 11/30/2018     Lab Results   Component Value Date    HDL 42 10/15/2019    HDL 43 05/29/2019    HDL 55 (L) 11/30/2018     No components found for: LDLCALC  Lab Results   Component Value Date     (H) 10/15/2019     (H) 05/29/2019     (H) 11/30/2018     No results found for: HDLLDLRATIO  No  components found for: CHOLHDL  Lab Results   Component Value Date    HGBA1C 5.40 10/15/2019     Lab Results   Component Value Date    TSH 1.220 10/12/2019           ASSESSMENT AND PLAN  Mr. Nielsen is progressing well clinically with no evidence of angina, arrhythmia or congestive heart failure.  As described above he does have evidence of ascending aortic aneurysm and possibly a bicuspid aortic valve.  To further delineate this a transesophageal echocardiogram is being arranged.  All risks and benefits have been explained to him in detail and he has been scheduled for the procedure on 5 December.  Further recommendations will follow.  I will continue antiplatelet therapy with aspirin and Effient, lipid-lowering therapy with simvastatin.  Patient unfortunately continues to smoke and aggressive risk factor modification and smoking cessation has been recommended.    Tre was seen today for follow-up.    Diagnoses and all orders for this visit:    Tobacco dependence syndrome    Coronary artery disease involving native coronary artery of native heart without angina pectoris    Mixed hyperlipidemia    Ascending aortic aneurysm (CMS/HCC)    Bicuspid aortic valve        Patient's Body mass index is 22.38 kg/m². BMI is within normal parameters. No follow-up required..      Tre Nielsen is a current cigarettes user.  He currently smokes 1 pack of cigarettes per day for a duration of 35 years. I have educated him on the risk of diseases from using tobacco products such as cancer, COPD and heart diease.       I spent 3  minutes counseling the patient.          Tobi Gary MD  11/27/2019  12:13 PM

## 2019-12-04 DIAGNOSIS — M54.50 CHRONIC BILATERAL LOW BACK PAIN WITHOUT SCIATICA: ICD-10-CM

## 2019-12-04 DIAGNOSIS — G89.29 CHRONIC BILATERAL LOW BACK PAIN WITHOUT SCIATICA: ICD-10-CM

## 2019-12-04 RX ORDER — METHOCARBAMOL 750 MG/1
TABLET, FILM COATED ORAL
Qty: 90 TABLET | Refills: 0 | Status: SHIPPED | OUTPATIENT
Start: 2019-12-04 | End: 2020-01-03

## 2019-12-05 ENCOUNTER — HOSPITAL ENCOUNTER (OUTPATIENT)
Dept: CARDIOLOGY | Facility: HOSPITAL | Age: 54
Discharge: HOME | End: 2019-12-05
Admitting: INTERNAL MEDICINE

## 2019-12-05 VITALS
RESPIRATION RATE: 20 BRPM | WEIGHT: 156.53 LBS | BODY MASS INDEX: 22.41 KG/M2 | HEIGHT: 70 IN | HEART RATE: 63 BPM | SYSTOLIC BLOOD PRESSURE: 110 MMHG | TEMPERATURE: 97 F | OXYGEN SATURATION: 97 % | DIASTOLIC BLOOD PRESSURE: 70 MMHG

## 2019-12-05 DIAGNOSIS — Q23.1 BICUSPID AORTIC VALVE: ICD-10-CM

## 2019-12-05 DIAGNOSIS — I71.20 THORACIC AORTIC ANEURYSM WITHOUT RUPTURE (HCC): ICD-10-CM

## 2019-12-05 LAB
ANION GAP SERPL CALCULATED.3IONS-SCNC: 7 MMOL/L (ref 5–15)
BUN BLD-MCNC: 18 MG/DL (ref 6–20)
BUN/CREAT SERPL: 25 (ref 7–25)
CALCIUM SPEC-SCNC: 9.5 MG/DL (ref 8.6–10.5)
CHLORIDE SERPL-SCNC: 103 MMOL/L (ref 98–107)
CO2 SERPL-SCNC: 26 MMOL/L (ref 22–29)
CREAT BLD-MCNC: 0.72 MG/DL (ref 0.76–1.27)
DEPRECATED RDW RBC AUTO: 40.6 FL (ref 37–54)
ERYTHROCYTE [DISTWIDTH] IN BLOOD BY AUTOMATED COUNT: 13.2 % (ref 12.3–15.4)
GFR SERPL CREATININE-BSD FRML MDRD: 114 ML/MIN/1.73
GLUCOSE BLD-MCNC: 106 MG/DL (ref 65–99)
HCT VFR BLD AUTO: 41.8 % (ref 37.5–51)
HGB BLD-MCNC: 14.2 G/DL (ref 13–17.7)
INR PPP: 0.96 (ref 0.8–1.2)
MCH RBC QN AUTO: 28.7 PG (ref 26.6–33)
MCHC RBC AUTO-ENTMCNC: 34 G/DL (ref 31.5–35.7)
MCV RBC AUTO: 84.4 FL (ref 79–97)
PLATELET # BLD AUTO: 248 10*3/MM3 (ref 140–450)
PMV BLD AUTO: 8.7 FL (ref 6–12)
POTASSIUM BLD-SCNC: 3.9 MMOL/L (ref 3.5–5.2)
PROTHROMBIN TIME: 12.6 SECONDS (ref 11.1–15.3)
RBC # BLD AUTO: 4.95 10*6/MM3 (ref 4.14–5.8)
SODIUM BLD-SCNC: 136 MMOL/L (ref 136–145)
WBC NRBC COR # BLD: 9.43 10*3/MM3 (ref 3.4–10.8)

## 2019-12-05 PROCEDURE — 93312 ECHO TRANSESOPHAGEAL: CPT

## 2019-12-05 PROCEDURE — 80048 BASIC METABOLIC PNL TOTAL CA: CPT | Performed by: INTERNAL MEDICINE

## 2019-12-05 PROCEDURE — 85610 PROTHROMBIN TIME: CPT | Performed by: INTERNAL MEDICINE

## 2019-12-05 PROCEDURE — 25010000002 MIDAZOLAM PER 1 MG: Performed by: INTERNAL MEDICINE

## 2019-12-05 PROCEDURE — 93312 ECHO TRANSESOPHAGEAL: CPT | Performed by: INTERNAL MEDICINE

## 2019-12-05 PROCEDURE — 85027 COMPLETE CBC AUTOMATED: CPT | Performed by: INTERNAL MEDICINE

## 2019-12-05 PROCEDURE — 93325 DOPPLER ECHO COLOR FLOW MAPG: CPT | Performed by: INTERNAL MEDICINE

## 2019-12-05 PROCEDURE — 93325 DOPPLER ECHO COLOR FLOW MAPG: CPT

## 2019-12-05 PROCEDURE — 25010000002 FENTANYL CITRATE (PF) 100 MCG/2ML SOLUTION: Performed by: INTERNAL MEDICINE

## 2019-12-05 RX ORDER — MIDAZOLAM HYDROCHLORIDE 1 MG/ML
INJECTION INTRAMUSCULAR; INTRAVENOUS
Status: COMPLETED | OUTPATIENT
Start: 2019-12-05 | End: 2019-12-05

## 2019-12-05 RX ORDER — SODIUM CHLORIDE 9 MG/ML
50 INJECTION, SOLUTION INTRAVENOUS CONTINUOUS
Status: DISCONTINUED | OUTPATIENT
Start: 2019-12-05 | End: 2019-12-06 | Stop reason: HOSPADM

## 2019-12-05 RX ORDER — FENTANYL CITRATE 50 UG/ML
INJECTION, SOLUTION INTRAMUSCULAR; INTRAVENOUS
Status: COMPLETED | OUTPATIENT
Start: 2019-12-05 | End: 2019-12-05

## 2019-12-05 RX ADMIN — MIDAZOLAM HYDROCHLORIDE 2 MG: 2 INJECTION, SOLUTION INTRAMUSCULAR; INTRAVENOUS at 09:25

## 2019-12-05 RX ADMIN — FENTANYL CITRATE 25 MCG: 50 INJECTION, SOLUTION INTRAMUSCULAR; INTRAVENOUS at 09:25

## 2019-12-05 RX ADMIN — MIDAZOLAM HYDROCHLORIDE 2 MG: 2 INJECTION, SOLUTION INTRAMUSCULAR; INTRAVENOUS at 09:23

## 2019-12-05 RX ADMIN — SODIUM CHLORIDE 50 ML/HR: 9 INJECTION, SOLUTION INTRAVENOUS at 08:21

## 2019-12-05 RX ADMIN — MIDAZOLAM HYDROCHLORIDE 1 MG: 2 INJECTION, SOLUTION INTRAMUSCULAR; INTRAVENOUS at 09:28

## 2019-12-05 RX ADMIN — FENTANYL CITRATE 25 MCG: 50 INJECTION, SOLUTION INTRAMUSCULAR; INTRAVENOUS at 09:23

## 2019-12-17 RX ORDER — ASPIRIN 81 MG/1
81 TABLET ORAL DAILY
Qty: 30 TABLET | Refills: 3 | Status: SHIPPED | OUTPATIENT
Start: 2019-12-17 | End: 2020-05-26

## 2019-12-17 RX ORDER — PRASUGREL 10 MG/1
10 TABLET, FILM COATED ORAL DAILY
Qty: 30 TABLET | Refills: 3 | Status: SHIPPED | OUTPATIENT
Start: 2019-12-17 | End: 2020-05-26

## 2020-01-03 DIAGNOSIS — E78.2 MIXED HYPERLIPIDEMIA: ICD-10-CM

## 2020-01-03 DIAGNOSIS — M54.50 CHRONIC BILATERAL LOW BACK PAIN WITHOUT SCIATICA: ICD-10-CM

## 2020-01-03 DIAGNOSIS — G89.29 CHRONIC BILATERAL LOW BACK PAIN WITHOUT SCIATICA: ICD-10-CM

## 2020-01-03 RX ORDER — SIMVASTATIN 20 MG
TABLET ORAL
Qty: 30 TABLET | Refills: 5 | Status: SHIPPED | OUTPATIENT
Start: 2020-01-03 | End: 2020-07-17

## 2020-01-03 RX ORDER — METHOCARBAMOL 750 MG/1
TABLET, FILM COATED ORAL
Qty: 90 TABLET | Refills: 0 | Status: SHIPPED | OUTPATIENT
Start: 2020-01-03 | End: 2020-02-03

## 2020-02-02 DIAGNOSIS — M54.50 CHRONIC BILATERAL LOW BACK PAIN WITHOUT SCIATICA: ICD-10-CM

## 2020-02-02 DIAGNOSIS — G89.29 CHRONIC BILATERAL LOW BACK PAIN WITHOUT SCIATICA: ICD-10-CM

## 2020-02-03 RX ORDER — METHOCARBAMOL 750 MG/1
TABLET, FILM COATED ORAL
Qty: 90 TABLET | Refills: 0 | Status: SHIPPED | OUTPATIENT
Start: 2020-02-03 | End: 2020-03-20

## 2020-03-10 NOTE — TELEPHONE ENCOUNTER
Patient called back and states he can not come in earlier.  Patient will try to come in by 3:40pm Per LINDA Posadas, Mr. Nielsen has been called with his recent lab results & recommendations.  Continue his current medications and follow-up as planned or sooner if any problems.    New Script sent.    ----- Message from LINDA Milan sent at 12/3/2018  7:26 AM CST -----  Please call and let him know that his blood work came back and everything was good with the exception of his cholesterol.  His total cholesterol has now risen to 255 and his bad cholesterol is now elevated at 171.  Need to increase his Zocor from 5 mg to 10 mg to be taken at night and he needs to adhere to low-fat diet.  He also needs to increase his exercise regimen which will help increase his good cholesterol.  Thank you.

## 2020-03-20 DIAGNOSIS — M54.50 CHRONIC BILATERAL LOW BACK PAIN WITHOUT SCIATICA: ICD-10-CM

## 2020-03-20 DIAGNOSIS — G89.29 CHRONIC BILATERAL LOW BACK PAIN WITHOUT SCIATICA: ICD-10-CM

## 2020-03-20 RX ORDER — METHOCARBAMOL 750 MG/1
TABLET, FILM COATED ORAL
Qty: 90 TABLET | Refills: 0 | Status: SHIPPED | OUTPATIENT
Start: 2020-03-20 | End: 2020-04-14

## 2020-04-14 DIAGNOSIS — G89.29 CHRONIC BILATERAL LOW BACK PAIN WITHOUT SCIATICA: ICD-10-CM

## 2020-04-14 DIAGNOSIS — M54.50 CHRONIC BILATERAL LOW BACK PAIN WITHOUT SCIATICA: ICD-10-CM

## 2020-04-14 RX ORDER — METHOCARBAMOL 750 MG/1
TABLET, FILM COATED ORAL
Qty: 90 TABLET | Refills: 0 | Status: SHIPPED | OUTPATIENT
Start: 2020-04-14 | End: 2020-05-14

## 2020-05-14 ENCOUNTER — OFFICE VISIT (OUTPATIENT)
Dept: FAMILY MEDICINE CLINIC | Facility: CLINIC | Age: 55
End: 2020-05-14

## 2020-05-14 VITALS
HEIGHT: 70 IN | SYSTOLIC BLOOD PRESSURE: 114 MMHG | BODY MASS INDEX: 22.48 KG/M2 | DIASTOLIC BLOOD PRESSURE: 76 MMHG | WEIGHT: 157 LBS

## 2020-05-14 DIAGNOSIS — G89.29 CHRONIC BILATERAL LOW BACK PAIN WITHOUT SCIATICA: ICD-10-CM

## 2020-05-14 DIAGNOSIS — F17.200 SMOKING: ICD-10-CM

## 2020-05-14 DIAGNOSIS — T14.8XXA BRUISE: Primary | ICD-10-CM

## 2020-05-14 DIAGNOSIS — M54.50 CHRONIC BILATERAL LOW BACK PAIN WITHOUT SCIATICA: ICD-10-CM

## 2020-05-14 PROCEDURE — 99213 OFFICE O/P EST LOW 20 MIN: CPT | Performed by: NURSE PRACTITIONER

## 2020-05-14 RX ORDER — HYDROCODONE BITARTRATE AND ACETAMINOPHEN 5; 325 MG/1; MG/1
TABLET ORAL
COMMUNITY
End: 2020-08-14

## 2020-05-14 RX ORDER — TACROLIMUS 1 MG/G
OINTMENT TOPICAL
COMMUNITY
End: 2020-08-14

## 2020-05-14 RX ORDER — METHOCARBAMOL 750 MG/1
TABLET, FILM COATED ORAL
Qty: 90 TABLET | Refills: 0 | Status: SHIPPED | OUTPATIENT
Start: 2020-05-14 | End: 2020-06-18

## 2020-05-14 NOTE — PROGRESS NOTES
"Subjective   Tre Maren Nielsen is a 54 y.o. male.  1 week ago felt \"a lump in my breast that was pretty sore at first.  Seems to feel better now but I still want to get it checked out.\"  Also concerned as his brother was recently diagnosed with lung cancer.  \"He is got 10 more years of smoking on me but I still been smoking for over 30 years.  I really think I should get my lungs checked.\"    Breast Problem   This is a new problem. The current episode started in the past 7 days. The problem occurs constantly. The problem has been gradually improving. Pertinent negatives include no chills, diaphoresis, fatigue or fever. Nothing aggravates the symptoms. He has tried nothing for the symptoms. The treatment provided moderate relief.   Nicotine Dependence   Presents for follow-up visit. Symptoms are negative for fatigue. His urge triggers include company of smokers. The symptoms have been stable. His first smoke is from 6 to 8 AM. He smokes 1 pack of cigarettes per day. Compliance with prior treatments has been variable.        The following portions of the patient's history were reviewed and updated as appropriate:     Current Outpatient Medications   Medication Sig Dispense Refill   • aspirin 81 MG EC tablet Take 1 tablet by mouth Daily. 30 tablet 3   • clobetasol (TEMOVATE) 0.05 % ointment Apply  topically to the appropriate area as directed 2 (Two) Times a Day. 45 g 2   • methocarbamol (ROBAXIN) 750 MG tablet TAKE 1 TABLET BY MOUTH THREE TIMES DAILY AS NEEDED FOR MUSCLE SPASMS 90 tablet 0   • metoprolol tartrate (LOPRESSOR) 25 MG tablet Take 0.5 tablets by mouth Every 12 (Twelve) Hours. 90 tablet 3   • prasugrel (EFFIENT) 10 MG tablet Take 1 tablet by mouth Daily. 30 tablet 3   • simvastatin (ZOCOR) 20 MG tablet TAKE 1 TABLET BY MOUTH EVERY NIGHT AT BEDTIME 30 tablet 5   • tacrolimus (PROTOPIC) 0.1 % ointment tacrolimus 0.1 % topical ointment   APPLY A THIN LAYER TO THE AFFECTED AREAS BY TOPICAL ROUTE 2 TIMES PER " DAY ; RUB IN GENTLY AND COMPLETELY. FOR HAND ONLY.     • HYDROcodone-acetaminophen (NORCO) 5-325 MG per tablet hydrocodone 5 mg-acetaminophen 325 mg tablet   TK 1 T PO  Q 4-6 H PRN P       No current facility-administered medications for this visit.      Current Outpatient Medications on File Prior to Visit   Medication Sig   • aspirin 81 MG EC tablet Take 1 tablet by mouth Daily.   • clobetasol (TEMOVATE) 0.05 % ointment Apply  topically to the appropriate area as directed 2 (Two) Times a Day.   • metoprolol tartrate (LOPRESSOR) 25 MG tablet Take 0.5 tablets by mouth Every 12 (Twelve) Hours.   • prasugrel (EFFIENT) 10 MG tablet Take 1 tablet by mouth Daily.   • simvastatin (ZOCOR) 20 MG tablet TAKE 1 TABLET BY MOUTH EVERY NIGHT AT BEDTIME   • tacrolimus (PROTOPIC) 0.1 % ointment tacrolimus 0.1 % topical ointment   APPLY A THIN LAYER TO THE AFFECTED AREAS BY TOPICAL ROUTE 2 TIMES PER DAY ; RUB IN GENTLY AND COMPLETELY. FOR HAND ONLY.   • HYDROcodone-acetaminophen (NORCO) 5-325 MG per tablet hydrocodone 5 mg-acetaminophen 325 mg tablet   TK 1 T PO  Q 4-6 H PRN P   • [DISCONTINUED] methocarbamol (ROBAXIN) 750 MG tablet TAKE 1 TABLET BY MOUTH THREE TIMES DAILY AS NEEDED FOR MUSCLE SPASMS     No current facility-administered medications on file prior to visit.      He has No Known Allergies..    Review of Systems   Constitutional: Negative.  Negative for chills, diaphoresis, fatigue and fever.   Respiratory: Negative.    Cardiovascular: Negative.    Gastrointestinal: Negative.    Musculoskeletal: Negative.    Skin: Negative.    Psychiatric/Behavioral: Negative for confusion.       Objective   Physical Exam   Constitutional: He is oriented to person, place, and time. He appears well-developed and well-nourished.   HENT:   Head: Normocephalic.   Right Ear: External ear normal.   Left Ear: External ear normal.   Eyes: Pupils are equal, round, and reactive to light. EOM are normal.   Neck: Normal range of motion. Neck  supple.   Cardiovascular: Normal rate, regular rhythm and normal heart sounds.   Pulmonary/Chest: Effort normal and breath sounds normal.   Bilateral manual breast exam performed and no dimpling, puckering, masses or nodules palpated       Abdominal: Soft. Bowel sounds are normal.   Musculoskeletal: Normal range of motion.   Neurological: He is alert and oriented to person, place, and time.   Skin: Skin is warm. Capillary refill takes less than 2 seconds.   Psychiatric: He has a normal mood and affect. His behavior is normal.   Nursing note and vitals reviewed.      Assessment/Plan   Problems Addressed this Visit     None      Visit Diagnoses     Bruise    -  Primary    Smoking        Relevant Orders    CT Chest Low Dose Wo      No orders of the defined types were placed in this encounter.    1.  Bruise:  Discussed at length no palpable masses but to continue with periodic breast checks as men can develop breast cancer  After exam remember striking his chest on something last week prior to onset of pain    2.  Smoking:  Radiology will call to schedule low-dose chest CT and will notify of results when available  Tre Nielsen  reports that he has been smoking cigarettes. He has been smoking about 1.00 pack per day. He has never used smokeless tobacco.. I have educated him on the risk of diseases from using tobacco products such as cancer, COPD and heart diease.   I advised him to quit and he is not willing to quit.  I spent 3  minutes counseling the patient.    Continue on current medications as previously prescribed   I spent 21 minutes in direct face to face contact with patient.  Greater than 50% of this time was spent counseling patient and discussing plan of care.  Return in about 3 months (around 8/14/2020) for Annual physical.        This document has been electronically signed by LINDA Milan on May 14, 2020 09:59

## 2020-05-26 RX ORDER — ASPIRIN 81 MG/1
TABLET, COATED ORAL
Qty: 30 TABLET | Refills: 3 | Status: SHIPPED | OUTPATIENT
Start: 2020-05-26 | End: 2020-06-10

## 2020-05-26 RX ORDER — PRASUGREL 10 MG/1
10 TABLET, FILM COATED ORAL DAILY
Qty: 30 TABLET | Refills: 3 | Status: SHIPPED | OUTPATIENT
Start: 2020-05-26 | End: 2020-06-10

## 2020-06-10 ENCOUNTER — OFFICE VISIT (OUTPATIENT)
Dept: CARDIOLOGY | Facility: CLINIC | Age: 55
End: 2020-06-10

## 2020-06-10 VITALS
BODY MASS INDEX: 22.28 KG/M2 | DIASTOLIC BLOOD PRESSURE: 72 MMHG | OXYGEN SATURATION: 97 % | SYSTOLIC BLOOD PRESSURE: 100 MMHG | WEIGHT: 155.6 LBS | HEIGHT: 70 IN | HEART RATE: 73 BPM

## 2020-06-10 DIAGNOSIS — Q23.1 BICUSPID AORTIC VALVE: ICD-10-CM

## 2020-06-10 DIAGNOSIS — I71.21 ASCENDING AORTIC ANEURYSM (HCC): ICD-10-CM

## 2020-06-10 DIAGNOSIS — E78.2 MIXED HYPERLIPIDEMIA: ICD-10-CM

## 2020-06-10 DIAGNOSIS — I25.10 CORONARY ARTERY DISEASE INVOLVING NATIVE CORONARY ARTERY OF NATIVE HEART WITHOUT ANGINA PECTORIS: Primary | ICD-10-CM

## 2020-06-10 PROCEDURE — 93000 ELECTROCARDIOGRAM COMPLETE: CPT | Performed by: INTERNAL MEDICINE

## 2020-06-10 PROCEDURE — 99214 OFFICE O/P EST MOD 30 MIN: CPT | Performed by: NURSE PRACTITIONER

## 2020-06-10 RX ORDER — PRASUGREL 10 MG/1
10 TABLET, FILM COATED ORAL DAILY
Qty: 90 TABLET | Refills: 3 | Status: SHIPPED | OUTPATIENT
Start: 2020-06-10 | End: 2020-09-25

## 2020-06-10 RX ORDER — ASPIRIN 81 MG/1
81 TABLET ORAL DAILY
Qty: 90 TABLET | Refills: 3 | Status: SHIPPED | OUTPATIENT
Start: 2020-06-10 | End: 2021-06-23

## 2020-06-10 NOTE — PROGRESS NOTES
Coronary Artery Disease      History of Present Illness      Mr. Nielsen is a 54-year-old  male with multiple risk factors for coronary artery disease including tobacco use, hyperlipidemia, positive family history for CAD who has presented with a rather unusual combination of vague chest discomfort, nausea, back pain associated with jerky movements in the left upper and lower extremities. EKG showed no acute ST-T wave changes but cardiac enzymes were elevated suggesting a non-ST segment elevation myocardial infarction.  Catheterization was hence recommended.      Procedures Performed:  1. Coronary Angiogram  2.  PCI to 99% stenosis in the proximal/mid Left Circumflex coronary artery  3. PCI to eccentric 80% stenosis in the mid Ramus Intermedius coronary artery.     Procedure Details  The risks, benefits, complications, treatment options, and expected outcomes were discussed with the patient. The patient and/or family concurred with the proposed plan, giving informed consent. Patient was brought to the cath lab after IV hydration was begun and oral premedication was given.    He was prepped and draped in the usual manner. Using the modified Seldinger access technique, a 6 Samoan sheath was placed in the right common femoral artery under ultrasound guidance. Diagnostic catheterization was performed using 6 Samoan FL 5, FR 4 catheters. Standard images of the coronary arteries were first obtained followed by PCI to proximal/mid left circumflex coronary artery followed by PCI to mid ramus intermedius coronary artery.      Vascular Access: Puncture site was in the common femoral artery above the bifurcation.     Findings:  Left main coronary artery: This vessel was engaged using a F L5 catheter.  The ascending aorta appeared to be dilated/aneurysmal.  Left main coronary artery was a patent vessel and this divided into a left anterior descending coronary artery, ramus intermedius coronary artery and left circumflex  coronary artery.  Left anterior descending coronary artery: This was a medium size patent vessel which is widely patent.  Diagonal 1 coronary artery was a medium sized vessel which was patent.  Diagonal 2 was a smaller vessel.  Distal LAD wrapped around the apex of the heart.  Ramus intermedius coronary artery: This was a medium to large caliber tortuous vessel and there was an eccentric 80% stenosis followed by an ectatic segment in the midsegment of the vessel.  Distally the ramus gave rise to multiple subbranches which were patent.  Left circumflex coronary artery: This was a medium sized vessel with a long area of 90+%  stenosis the proximal/mid segment.   Right coronary artery: This is a medium sized, dominant, tortuous vessel with minor luminal irregularities noted in the distal segment.  No flow-limiting lesions identified.     Interventions: PCI to >90+% lesion in the Left Circumflex Coronary Artery:  The patient was given Integrilin bolus and Angiomax as per protocol and a 6 English XB LAD 4 guide was advanced to the ostium of the left main coronary artery.  A 0.014 BMW wire was then advanced into the circumflex vessel past the lesion to the distal part of the vessel.  The lesion was predilated using a 2.0 x 12 mm Trek balloon followed by placement of a 2.5 x 18 mm Xience Breann stent which was deployed at nominal pressures.  Subsequently a 2.5 x 12 mm NC Trek balloon used to post dilate the stent to 18 breann.  Final result was excellent with a residual of 0%.  400 mcg of intracoronary nitroglycerin was used postprocedure.     PCI to 80% lesion in the Mid Ramus Intermedius Coronary Artery:  The 0.014 BMW wire was then redirected into the tortuous ramus intermedius coronary artery past the lesion into the distal part of the vessel. Lesion was predilated using a 2.0 x 15 mm Mini Trek balloon. Subsequently a 2.75 x 18 mm Breann stent was appropriately positioned to cover the length of the lesion and deployed at  13 breann.  Final result was excellent with a residual of 0%.  400 mcg of intracoronary nitroglycerin was used postprocedure.  There was CHRISTA-3 flow through the vessel.     Impression: >90+% lesion noted in the left circumflex coronary artery and successful PCI with placement of a 2.5 x 18 mm Xience Breann stent and reduction stenosis to 0%.  80% lesion noted in the mid ramus intermedius coronary artery and successful PCI with placement of a 2.75 x 18 mm Xience Breann stent and reduction stenosis 0%  Left anterior descending coronary artery and right coronary artery were widely patent with minor luminal irregularities.  Left ventriculogram was not performed.  Ascending aorta was ectatic/aneurysmal and to further delineate this a CT of the chest/aorta will be arranged.     · The study is technically difficult for diagnosis.  · Estimated EF = 68%.  · Left ventricular systolic function is normal.  · Right ventricular cavity is mildly dilated.  · Left atrial cavity size is mildly dilated.  · There is calcification of the aortic valve. Not well seen. Cannot exclude Bicuspid AV.  · Mild aortic valve regurgitation is present.     CT FINDINGS: Heart size normal. No evidence of pathologically  enlarged nodes. Subtle fibrotic changes at both lung bases. Lungs  otherwise clear.      Dilated ascending aorta 4.71 cm series 2 image 54. Normal caliber  aortic arch 2.66 cm series 2 image 33. Normal descending aorta.  Normal caliber abdominal aorta at 1.8 cm series 2 image 123.      The patient has done quite well since the procedure and denied any chest pain or shortness of breath.  As described above he possibly has a bicuspid aortic valve with ascending aortic aneurysm measuring up to 4.71 cm.  To further evaluate this a transesophageal echocardiogram would be appropriate.    6/10/20: 6 month follow. Doing well. No chest pain.       Past Medical History:   Diagnosis Date   • Aortic aneurysm (CMS/HCC)    • Candidiasis of skin and  nails 02/03/2016   • Chronic pain disorder    • Coronary artery disease    • Depression    • Dyslipidemia 07/28/2015   • Joint pain 02/03/2016    RIGHT SHOULDER   • Low back pain 05/10/2016   • Muscle weakness 12/07/2015   • Pure hypercholesterolemia 07/28/2015   • Tobacco dependence syndrome 06/09/2015     Past Surgical History:   Procedure Laterality Date   • CARDIAC CATHETERIZATION N/A 10/14/2019    Procedure: Left Heart Cath/ PCI  if indicated;  Surgeon: Tobi Gary MD;  Location: [facility] INVASIVE LOCATION;  Service: Cardiovascular     Social History     Socioeconomic History   • Marital status:      Spouse name: Not on file   • Number of children: Not on file   • Years of education: Not on file   • Highest education level: Not on file   Tobacco Use   • Smoking status: Current Every Day Smoker     Packs/day: 1.00     Types: Cigarettes   • Smokeless tobacco: Never Used   Substance and Sexual Activity   • Alcohol use: Yes     Alcohol/week: 5.0 standard drinks     Types: 5 Cans of beer per week   • Drug use: Yes     Types: Marijuana   • Sexual activity: Defer     Family History   Problem Relation Age of Onset   • Diabetes Mother    • Migraines Sister    • Diabetes Maternal Grandmother        ALLERGIES:  No Known Allergies      Review of Systems   Constitution: Negative for chills, decreased appetite and fever.   HENT: Negative.    Eyes: Negative.    Cardiovascular: Negative for chest pain, claudication, dyspnea on exertion, irregular heartbeat, leg swelling and palpitations.   Respiratory: Negative for cough, shortness of breath and wheezing.    Endocrine: Negative.    Skin: Negative for dry skin, flushing and rash.   Musculoskeletal: Negative for falls and myalgias.   Gastrointestinal: Negative for abdominal pain, change in bowel habit and melena.   Genitourinary: Negative for frequency and hematuria.   Neurological: Negative for dizziness, light-headedness, loss of balance and weakness.    Psychiatric/Behavioral: Negative for altered mental status and memory loss. The patient is not nervous/anxious.        Current Outpatient Medications   Medication Sig Dispense Refill   • clobetasol (TEMOVATE) 0.05 % ointment Apply  topically to the appropriate area as directed 2 (Two) Times a Day. 45 g 2   • HYDROcodone-acetaminophen (NORCO) 5-325 MG per tablet hydrocodone 5 mg-acetaminophen 325 mg tablet   TK 1 T PO  Q 4-6 H PRN P     • methocarbamol (ROBAXIN) 750 MG tablet TAKE 1 TABLET BY MOUTH THREE TIMES DAILY AS NEEDED FOR MUSCLE SPASMS 90 tablet 0   • metoprolol tartrate (LOPRESSOR) 25 MG tablet Take 0.5 tablets by mouth Every 12 (Twelve) Hours. 90 tablet 3   • simvastatin (ZOCOR) 20 MG tablet TAKE 1 TABLET BY MOUTH EVERY NIGHT AT BEDTIME 30 tablet 5   • tacrolimus (PROTOPIC) 0.1 % ointment tacrolimus 0.1 % topical ointment   APPLY A THIN LAYER TO THE AFFECTED AREAS BY TOPICAL ROUTE 2 TIMES PER DAY ; RUB IN GENTLY AND COMPLETELY. FOR HAND ONLY.     • aspirin (Aspirin Low Dose) 81 MG EC tablet Take 1 tablet by mouth Daily. 90 tablet 3   • prasugrel (EFFIENT) 10 MG tablet Take 1 tablet by mouth Daily. 90 tablet 3     No current facility-administered medications for this visit.        OBJECTIVE:    Physical Exam:   Physical Exam   Constitutional: He is oriented to person, place, and time. He appears well-developed and well-nourished. No distress.   HENT:   Head: Normocephalic and atraumatic.   Neck: Normal range of motion. No JVD present.   Cardiovascular: Normal rate, regular rhythm, S1 normal, S2 normal, normal heart sounds and intact distal pulses.   No murmur heard.  Pulmonary/Chest: Effort normal and breath sounds normal. No respiratory distress. He has no wheezes. He has no rales.   Abdominal: Soft. Bowel sounds are normal.   Musculoskeletal: Normal range of motion. He exhibits no edema.   Neurological: He is alert and oriented to person, place, and time.   Skin: Skin is warm and dry. No erythema.    "  Psychiatric: He has a normal mood and affect. His behavior is normal. Judgment and thought content normal.     Vitals:    06/10/20 1002   BP: 100/72   BP Location: Left arm   Patient Position: Sitting   Cuff Size: Adult   Pulse: 73   SpO2: 97%   Weight: 70.6 kg (155 lb 9.6 oz)   Height: 177.8 cm (70\")       DATA REVIEWED:   Results for orders placed during the hospital encounter of 12/05/19   Adult Transesophageal Echo (LAURENCE) W/ Cont if Necessary Per Protocol    Narrative Transesophageal echocardiogram:   Mr. Nielsen is a 54-year-old  male with multiple risk factors for   coronary artery disease including tobacco use, hyperlipidemia, positive   family history for CAD who underwent PCI to left circumflex coronary   artery and ramus intermedius coronary artery in October 2019 when he   presented with non-ST segment elevation myocardial infarction.  He was   also noted to have a bicuspid aortic valve which was calcific with a   sending aortic aneurysm measuring 4.7 cm by CT angiogram of the aorta.  LAURENCE was recommended to assess the aortic valve    The patient was brought to the cardiac catheterization lab in the   posterior pharynx was anesthetized using Cetacaine.  Versed and fentanyl   was used for sedation.  Transesophageal probe was advanced into the esophagus without   complications.  Standard images were obtained.    Result: Overall LV systolic contractile he was normal with an ejection   fraction of 55 to 60% with no wall motion abnormality.  No LV mass or   thrombus was noted.  Aortic root was mildly dilated at 4.3 cm.  Left atrium was normal in size.  Left atrial appendage was intact.  No   left atrial mass or thrombus was noted.  Right atrium was normal in size.    Inter atrial septum was intact and saline contrast studies did not show   any PFO or ASD  Mitral valve was structurally normal with trace to mild mitral   regurgitation.  Aortic valve was bicuspid with mild calcification noted.  The valve " seems   to open quite well.  Trace aortic insufficiency was noted.    Impression: Bicuspid aortic valve with mild calcification with no evidence   of aortic stenosis.  There was trace aortic valve insufficiency.  Mitral valve and tricuspid valve appeared structurally normal.  Normal   left ventricular systolic function with no wall motion abnormalities.  Aortic root/ascending aorta was mildly dilated.  Aortic root measured 4.3   cm.       No radiology results for the last 30 days.     Labs: BMP, CBC, LIPID, TSH  Lab Results   Component Value Date    GLUCOSE 106 (H) 12/05/2019    CALCIUM 9.5 12/05/2019     12/05/2019    K 3.9 12/05/2019    CO2 26.0 12/05/2019     12/05/2019    BUN 18 12/05/2019    CREATININE 0.72 (L) 12/05/2019    EGFRIFNONA 114 12/05/2019    BCR 25.0 12/05/2019    ANIONGAP 7.0 12/05/2019     Lab Results   Component Value Date    WBC 9.43 12/05/2019    HGB 14.2 12/05/2019    HCT 41.8 12/05/2019    MCV 84.4 12/05/2019     12/05/2019     Lab Results   Component Value Date    CHOL 182 10/15/2019    CHOL 224 (H) 05/29/2019    CHOL 255 (H) 11/30/2018     Lab Results   Component Value Date    TRIG 104 10/15/2019    TRIG 109 05/29/2019    TRIG 103 11/30/2018     Lab Results   Component Value Date    HDL 42 10/15/2019    HDL 43 05/29/2019    HDL 55 (L) 11/30/2018     No components found for: LDLCALC  Lab Results   Component Value Date     (H) 10/15/2019     (H) 05/29/2019     (H) 11/30/2018     No results found for: HDLLDLRATIO  No components found for: CHOLHDL  Lab Results   Component Value Date    TSH 1.220 10/12/2019     Lab Results   Component Value Date    PROBNP 39.6 10/12/2019       The following portions of the patient's history were reviewed and updated as appropriate: allergies, current medications, past family history, past medical history, past social history, past surgical history and problem list.  Old records reviewed and pertinent information is  included in the above objective data.     ASSESSMENT/PLAN:       Diagnosis Plan   1. Coronary artery disease involving native coronary artery of native heart without angina pectoris  - continues to smoke  Continue current treatment regimen.  Dietary sodium restriction.  Weight loss.  Regular aerobic exercise.  Stop smoking.  Continue current medications.  Antiplatelet therapy: Effient 10mg    Beta-blocker: 12.5mg Lopressor    Statin: Zocor 20mg. Lipid panel soon to see if LDL <70    ASA: 81mg    Further evaluation per orders.    CCS Functional Classification of Angina   Class Activity Evoking Angina Limits to Physical Activity   I Prolonged exertion None   II Walking > 2 blocks or > 1 flight of stairs Slight   III Walking < 2 blocks or < 1 flight of stairs Marked   IV Minimal or at rest Severe        2. Bicuspid aortic valve  Echo surveillance       3. Ascending aortic aneurysm (CMS/HCC)  CT chest surveillance with PCP       4. Mixed hyperlipidemia  .Zocor 20mg nightly. Will need lipid soon to see if LDL <70.          Patient's Body mass index is 22.33 kg/m². BMI is within normal parameters. No follow-up required..    Follow up 6 months with Dr. Howard           This document has been electronically signed by LINDA Romero on Roya 10, 2020 11:40

## 2020-06-18 DIAGNOSIS — M54.50 CHRONIC BILATERAL LOW BACK PAIN WITHOUT SCIATICA: ICD-10-CM

## 2020-06-18 DIAGNOSIS — G89.29 CHRONIC BILATERAL LOW BACK PAIN WITHOUT SCIATICA: ICD-10-CM

## 2020-06-18 RX ORDER — METHOCARBAMOL 750 MG/1
TABLET, FILM COATED ORAL
Qty: 90 TABLET | Refills: 0 | Status: SHIPPED | OUTPATIENT
Start: 2020-06-18 | End: 2020-07-17

## 2020-07-17 DIAGNOSIS — G89.29 CHRONIC BILATERAL LOW BACK PAIN WITHOUT SCIATICA: ICD-10-CM

## 2020-07-17 DIAGNOSIS — E78.2 MIXED HYPERLIPIDEMIA: ICD-10-CM

## 2020-07-17 DIAGNOSIS — M54.50 CHRONIC BILATERAL LOW BACK PAIN WITHOUT SCIATICA: ICD-10-CM

## 2020-07-17 RX ORDER — SIMVASTATIN 20 MG
TABLET ORAL
Qty: 30 TABLET | Refills: 5 | Status: SHIPPED | OUTPATIENT
Start: 2020-07-17 | End: 2021-01-25

## 2020-07-17 RX ORDER — METHOCARBAMOL 750 MG/1
TABLET, FILM COATED ORAL
Qty: 90 TABLET | Refills: 0 | Status: SHIPPED | OUTPATIENT
Start: 2020-07-17 | End: 2020-08-14

## 2020-08-14 ENCOUNTER — OFFICE VISIT (OUTPATIENT)
Dept: FAMILY MEDICINE CLINIC | Facility: CLINIC | Age: 55
End: 2020-08-14

## 2020-08-14 VITALS
DIASTOLIC BLOOD PRESSURE: 74 MMHG | WEIGHT: 154.6 LBS | BODY MASS INDEX: 22.13 KG/M2 | HEIGHT: 70 IN | SYSTOLIC BLOOD PRESSURE: 102 MMHG

## 2020-08-14 DIAGNOSIS — M54.50 CHRONIC BILATERAL LOW BACK PAIN WITHOUT SCIATICA: Primary | ICD-10-CM

## 2020-08-14 DIAGNOSIS — E78.00 PURE HYPERCHOLESTEROLEMIA: ICD-10-CM

## 2020-08-14 DIAGNOSIS — G89.29 CHRONIC BILATERAL LOW BACK PAIN WITHOUT SCIATICA: Primary | ICD-10-CM

## 2020-08-14 PROCEDURE — 99213 OFFICE O/P EST LOW 20 MIN: CPT | Performed by: NURSE PRACTITIONER

## 2020-08-14 RX ORDER — CYCLOBENZAPRINE HCL 5 MG
10 TABLET ORAL 3 TIMES DAILY PRN
Qty: 90 TABLET | Refills: 2 | Status: SHIPPED | OUTPATIENT
Start: 2020-08-14 | End: 2020-12-02

## 2020-08-14 NOTE — PROGRESS NOTES
"Subjective   Tre Maren Nielsen is a 54 y.o. male.  Three month follow-up for chronic back pain and high cholesterol.  Currently on Robaxin for his back.  \"I cannot even really tell that I am taking anything.  It feels better since the surgery but I still cannot tell the Robaxin has been helping.\"    Back Pain   This is a chronic problem. The current episode started more than 1 year ago. The problem occurs intermittently. The problem is unchanged. The pain is present in the lumbar spine. The pain is at a severity of 3/10. The pain is mild. The symptoms are aggravated by position. Pertinent negatives include no fever or leg pain. He has tried muscle relaxant (Surgical intervention, Ibupofen) for the symptoms. The treatment provided moderate relief.   Hyperlipidemia   This is a chronic problem. The current episode started more than 1 year ago. The problem is controlled. Factors aggravating his hyperlipidemia include fatty foods and smoking. Pertinent negatives include no leg pain or shortness of breath. Current antihyperlipidemic treatment includes statins. The current treatment provides significant improvement of lipids. There are no compliance problems.  Risk factors for coronary artery disease include dyslipidemia, family history and male sex.        The following portions of the patient's history were reviewed and updated as appropriate:   Current Outpatient Medications   Medication Sig Dispense Refill   • aspirin (Aspirin Low Dose) 81 MG EC tablet Take 1 tablet by mouth Daily. 90 tablet 3   • metoprolol tartrate (LOPRESSOR) 25 MG tablet Take 0.5 tablets by mouth Every 12 (Twelve) Hours. 90 tablet 3   • prasugrel (EFFIENT) 10 MG tablet Take 1 tablet by mouth Daily. 90 tablet 3   • simvastatin (ZOCOR) 20 MG tablet TAKE 1 TABLET BY MOUTH EVERY NIGHT AT BEDTIME 30 tablet 5   • cyclobenzaprine (FLEXERIL) 5 MG tablet Take 2 tablets by mouth 3 (Three) Times a Day As Needed for Muscle Spasms. 90 tablet 2     No current " "facility-administered medications for this visit.      Current Outpatient Medications on File Prior to Visit   Medication Sig   • aspirin (Aspirin Low Dose) 81 MG EC tablet Take 1 tablet by mouth Daily.   • metoprolol tartrate (LOPRESSOR) 25 MG tablet Take 0.5 tablets by mouth Every 12 (Twelve) Hours.   • prasugrel (EFFIENT) 10 MG tablet Take 1 tablet by mouth Daily.   • simvastatin (ZOCOR) 20 MG tablet TAKE 1 TABLET BY MOUTH EVERY NIGHT AT BEDTIME   • [DISCONTINUED] clobetasol (TEMOVATE) 0.05 % ointment Apply  topically to the appropriate area as directed 2 (Two) Times a Day.   • [DISCONTINUED] methocarbamol (ROBAXIN) 750 MG tablet TAKE 1 TABLET BY MOUTH THREE TIMES DAILY AS NEEDED MUSCLE SPASMS   • [DISCONTINUED] HYDROcodone-acetaminophen (NORCO) 5-325 MG per tablet hydrocodone 5 mg-acetaminophen 325 mg tablet   TK 1 T PO  Q 4-6 H PRN P   • [DISCONTINUED] tacrolimus (PROTOPIC) 0.1 % ointment tacrolimus 0.1 % topical ointment   APPLY A THIN LAYER TO THE AFFECTED AREAS BY TOPICAL ROUTE 2 TIMES PER DAY ; RUB IN GENTLY AND COMPLETELY. FOR HAND ONLY.     No current facility-administered medications on file prior to visit.      He has No Known Allergies..    Review of Systems   Constitutional: Negative.  Negative for chills, diaphoresis, fatigue and fever.   HENT: Negative.  Negative for sore throat.    Respiratory: Negative.  Negative for cough and shortness of breath.    Cardiovascular: Negative.    Gastrointestinal: Negative.    Genitourinary: Negative.    Musculoskeletal: Positive for back pain.   Skin: Negative.    Neurological: Negative.        Objective    Visit Vitals  /74   Ht 177.8 cm (70\")   Wt 70.1 kg (154 lb 9.6 oz)   BMI 22.18 kg/m²       Physical Exam   Constitutional: He is oriented to person, place, and time. He appears well-developed and well-nourished.   HENT:   Head: Normocephalic.   Neck: Normal range of motion.   Cardiovascular: Normal rate, regular rhythm and normal heart sounds.   "   Pulmonary/Chest: Effort normal and breath sounds normal.   Abdominal: Soft. Bowel sounds are normal.   Musculoskeletal: Normal range of motion.   Neurological: He is alert and oriented to person, place, and time.   Skin: Skin is warm.   Psychiatric: He has a normal mood and affect. His behavior is normal.   Nursing note and vitals reviewed.      Assessment/Plan   Problems Addressed this Visit        Cardiovascular and Mediastinum    Pure hypercholesterolemia       Nervous and Auditory    Chronic bilateral low back pain without sciatica - Primary    Relevant Medications    cyclobenzaprine (FLEXERIL) 5 MG tablet        New Medications Ordered This Visit   Medications   • cyclobenzaprine (FLEXERIL) 5 MG tablet     Sig: Take 2 tablets by mouth 3 (Three) Times a Day As Needed for Muscle Spasms.     Dispense:  90 tablet     Refill:  2     1.  Chronic bilateral low back pain without sciatica:  Discontinue Robaxin as previously prescribed  Begin Flexeril as prescribed  Educated on possible side effects of this medication including not limited to increased risk for drowsiness, sedation constipation and blurred vision  Continue with lumbar PT exercises at home to help strengthen core  Continue on prescription strength ibuprofen as previously prescribed  Educated on possible side effects of this medication including but not limited to increased risk for gastrointestinal bleeding  Encouraged not to take any other NSAIDs including but not limited to Advil, ibuprofen, Motrin, Naprosyn or Aleve while on this medication if may increased risk for gastrointestinal bleeding  Encouraged to take this medication with food in order to reduce GI discomfort  Educated to watch for signs of possible GI bleeding such as dark, black or tarry looking stools    2.  Pure hypercholesterolemia:  Continue on Zocor as previously prescribed  Encouraged to continue to adhere to a low-fat diet  Discussed ways to reduce saturated fats in diet such as  using all of oil as opposed to vegetable oil when cooking  Will complete fasting lipid panel at next appointment    Continue on current medications as previously prescribed   I spent 21 minutes in direct face to face contact with patient.  Greater than 50% of this time was spent counseling patient and discussing plan of care.  Return in about 3 months (around 11/14/2020) for Annual physical.        This document has been electronically signed by LINDA Milan on August 14, 2020 09:17

## 2020-09-25 RX ORDER — PRASUGREL 10 MG/1
TABLET, FILM COATED ORAL
Qty: 30 TABLET | Refills: 6 | Status: SHIPPED | OUTPATIENT
Start: 2020-09-25 | End: 2021-06-23

## 2020-12-02 DIAGNOSIS — M54.50 CHRONIC BILATERAL LOW BACK PAIN WITHOUT SCIATICA: ICD-10-CM

## 2020-12-02 DIAGNOSIS — G89.29 CHRONIC BILATERAL LOW BACK PAIN WITHOUT SCIATICA: ICD-10-CM

## 2020-12-02 RX ORDER — CYCLOBENZAPRINE HCL 5 MG
TABLET ORAL
Qty: 90 TABLET | Refills: 2 | Status: SHIPPED | OUTPATIENT
Start: 2020-12-02 | End: 2021-02-22

## 2020-12-04 DIAGNOSIS — Z72.0 NICOTINE ABUSE: ICD-10-CM

## 2020-12-04 RX ORDER — NICOTINE 21 MG/24HR
PATCH, TRANSDERMAL 24 HOURS TRANSDERMAL
Qty: 28 PATCH | OUTPATIENT
Start: 2020-12-04

## 2020-12-04 NOTE — TELEPHONE ENCOUNTER
Script is not listed as a current medication  Medication has not been filled for 11 mos.   Refill Denied today

## 2021-01-05 ENCOUNTER — OFFICE VISIT (OUTPATIENT)
Dept: CARDIOLOGY | Facility: CLINIC | Age: 56
End: 2021-01-05

## 2021-01-05 ENCOUNTER — LAB (OUTPATIENT)
Dept: LAB | Facility: HOSPITAL | Age: 56
End: 2021-01-05

## 2021-01-05 VITALS
OXYGEN SATURATION: 98 % | HEIGHT: 70 IN | BODY MASS INDEX: 22.02 KG/M2 | SYSTOLIC BLOOD PRESSURE: 102 MMHG | DIASTOLIC BLOOD PRESSURE: 78 MMHG | WEIGHT: 153.8 LBS | HEART RATE: 77 BPM

## 2021-01-05 DIAGNOSIS — I25.10 CORONARY ARTERY DISEASE INVOLVING NATIVE CORONARY ARTERY OF NATIVE HEART WITHOUT ANGINA PECTORIS: ICD-10-CM

## 2021-01-05 DIAGNOSIS — I71.21 ASCENDING AORTIC ANEURYSM (HCC): ICD-10-CM

## 2021-01-05 DIAGNOSIS — Q23.1 BICUSPID AORTIC VALVE: ICD-10-CM

## 2021-01-05 DIAGNOSIS — E78.2 MIXED HYPERLIPIDEMIA: ICD-10-CM

## 2021-01-05 DIAGNOSIS — F17.200 TOBACCO DEPENDENCE SYNDROME: ICD-10-CM

## 2021-01-05 DIAGNOSIS — I71.21 ASCENDING AORTIC ANEURYSM (HCC): Primary | ICD-10-CM

## 2021-01-05 LAB
ALBUMIN SERPL-MCNC: 4.5 G/DL (ref 3.5–5.2)
ALBUMIN/GLOB SERPL: 2.6 G/DL
ALP SERPL-CCNC: 72 U/L (ref 39–117)
ALT SERPL W P-5'-P-CCNC: 12 U/L (ref 1–41)
ANION GAP SERPL CALCULATED.3IONS-SCNC: 7.8 MMOL/L (ref 5–15)
AST SERPL-CCNC: 16 U/L (ref 1–40)
BASOPHILS # BLD AUTO: 0.06 10*3/MM3 (ref 0–0.2)
BASOPHILS NFR BLD AUTO: 0.8 % (ref 0–1.5)
BILIRUB SERPL-MCNC: 0.3 MG/DL (ref 0–1.2)
BUN SERPL-MCNC: 14 MG/DL (ref 6–20)
BUN/CREAT SERPL: 20 (ref 7–25)
CALCIUM SPEC-SCNC: 9 MG/DL (ref 8.6–10.5)
CHLORIDE SERPL-SCNC: 105 MMOL/L (ref 98–107)
CHOLEST SERPL-MCNC: 138 MG/DL (ref 0–200)
CO2 SERPL-SCNC: 27.2 MMOL/L (ref 22–29)
CREAT SERPL-MCNC: 0.7 MG/DL (ref 0.76–1.27)
DEPRECATED RDW RBC AUTO: 39.2 FL (ref 37–54)
EOSINOPHIL # BLD AUTO: 0.19 10*3/MM3 (ref 0–0.4)
EOSINOPHIL NFR BLD AUTO: 2.6 % (ref 0.3–6.2)
ERYTHROCYTE [DISTWIDTH] IN BLOOD BY AUTOMATED COUNT: 12.9 % (ref 12.3–15.4)
GFR SERPL CREATININE-BSD FRML MDRD: 118 ML/MIN/1.73
GLOBULIN UR ELPH-MCNC: 1.7 GM/DL
GLUCOSE SERPL-MCNC: 82 MG/DL (ref 65–99)
HCT VFR BLD AUTO: 44.1 % (ref 37.5–51)
HDLC SERPL-MCNC: 45 MG/DL (ref 40–60)
HGB BLD-MCNC: 15.1 G/DL (ref 13–17.7)
IMM GRANULOCYTES # BLD AUTO: 0.01 10*3/MM3 (ref 0–0.05)
IMM GRANULOCYTES NFR BLD AUTO: 0.1 % (ref 0–0.5)
LDLC SERPL CALC-MCNC: 77 MG/DL (ref 0–100)
LDLC/HDLC SERPL: 1.7 {RATIO}
LYMPHOCYTES # BLD AUTO: 2.24 10*3/MM3 (ref 0.7–3.1)
LYMPHOCYTES NFR BLD AUTO: 31 % (ref 19.6–45.3)
MCH RBC QN AUTO: 29.2 PG (ref 26.6–33)
MCHC RBC AUTO-ENTMCNC: 34.2 G/DL (ref 31.5–35.7)
MCV RBC AUTO: 85.1 FL (ref 79–97)
MONOCYTES # BLD AUTO: 0.5 10*3/MM3 (ref 0.1–0.9)
MONOCYTES NFR BLD AUTO: 6.9 % (ref 5–12)
NEUTROPHILS NFR BLD AUTO: 4.23 10*3/MM3 (ref 1.7–7)
NEUTROPHILS NFR BLD AUTO: 58.6 % (ref 42.7–76)
NRBC BLD AUTO-RTO: 0 /100 WBC (ref 0–0.2)
PLATELET # BLD AUTO: 291 10*3/MM3 (ref 140–450)
PMV BLD AUTO: 9.3 FL (ref 6–12)
POTASSIUM SERPL-SCNC: 4.1 MMOL/L (ref 3.5–5.2)
PROT SERPL-MCNC: 6.2 G/DL (ref 6–8.5)
RBC # BLD AUTO: 5.18 10*6/MM3 (ref 4.14–5.8)
SODIUM SERPL-SCNC: 140 MMOL/L (ref 136–145)
TRIGL SERPL-MCNC: 82 MG/DL (ref 0–150)
VLDLC SERPL-MCNC: 16 MG/DL (ref 5–40)
WBC # BLD AUTO: 7.23 10*3/MM3 (ref 3.4–10.8)

## 2021-01-05 PROCEDURE — 99214 OFFICE O/P EST MOD 30 MIN: CPT | Performed by: INTERNAL MEDICINE

## 2021-01-05 PROCEDURE — 80061 LIPID PANEL: CPT

## 2021-01-05 PROCEDURE — 85025 COMPLETE CBC W/AUTO DIFF WBC: CPT | Performed by: INTERNAL MEDICINE

## 2021-01-05 PROCEDURE — 36415 COLL VENOUS BLD VENIPUNCTURE: CPT | Performed by: INTERNAL MEDICINE

## 2021-01-05 PROCEDURE — 80053 COMPREHEN METABOLIC PANEL: CPT | Performed by: INTERNAL MEDICINE

## 2021-01-05 NOTE — PROGRESS NOTES
Tre Nielsen  55 y.o. male    1. Ascending aortic aneurysm (CMS/HCC)    2. Mixed hyperlipidemia    3. Coronary artery disease involving native coronary artery of native heart without angina pectoris    4. Tobacco dependence syndrome    5. Bicuspid aortic valve        History of Present Illness  Mr. Nielsen is a 55-year-old  male with multiple risk factors for coronary artery disease including tobacco use, hyperlipidemia, positive family history for CAD who presented in 10/ 2019 with a rather unusual combination of vague chest discomfort, nausea, back pain associated with jerky movements in the left upper and lower extremities. EKG showed no acute ST-T wave changes but cardiac enzymes were elevated suggesting a non-ST segment elevation myocardial infarction.  Catheterization was hence recommended.      Procedures Performed:  1. Coronary Angiogram  2.  PCI to 99% stenosis in the proximal/mid Left Circumflex coronary artery  3. PCI to eccentric 80% stenosis in the mid Ramus Intermedius coronary artery.      Findings:  Left main coronary artery: This vessel was engaged using a F L5 catheter.  The ascending aorta appeared to be dilated/aneurysmal.  Left main coronary artery was a patent vessel and this divided into a left anterior descending coronary artery, ramus intermedius coronary artery and left circumflex coronary artery.  Left anterior descending coronary artery: This was a medium size patent vessel which is widely patent.  Diagonal 1 coronary artery was a medium sized vessel which was patent.  Diagonal 2 was a smaller vessel.  Distal LAD wrapped around the apex of the heart.  Ramus intermedius coronary artery: This was a medium to large caliber tortuous vessel and there was an eccentric 80% stenosis followed by an ectatic segment in the midsegment of the vessel.  Distally the ramus gave rise to multiple subbranches which were patent.  Left circumflex coronary artery: This was a medium sized vessel  with a long area of 90+%  stenosis the proximal/mid segment.   Right coronary artery: This is a medium sized, dominant, tortuous vessel with minor luminal irregularities noted in the distal segment.  No flow-limiting lesions identified.     Interventions: PCI to >90+% lesion in the Left Circumflex Coronary Artery:  The patient was given Integrilin bolus and Angiomax as per protocol and a 6 Yi XB LAD 4 guide was advanced to the ostium of the left main coronary artery.  A 0.014 BMW wire was then advanced into the circumflex vessel past the lesion to the distal part of the vessel.  The lesion was predilated using a 2.0 x 12 mm Trek balloon followed by placement of a 2.5 x 18 mm Xience Breann stent which was deployed at nominal pressures.  Subsequently a 2.5 x 12 mm NC Trek balloon used to post dilate the stent to 18 breann.  Final result was excellent with a residual of 0%.  400 mcg of intracoronary nitroglycerin was used postprocedure.     PCI to 80% lesion in the Mid Ramus Intermedius Coronary Artery:  The 0.014 BMW wire was then redirected into the tortuous ramus intermedius coronary artery past the lesion into the distal part of the vessel. Lesion was predilated using a 2.0 x 15 mm Mini Trek balloon. Subsequently a 2.75 x 18 mm Breann stent was appropriately positioned to cover the length of the lesion and deployed at 13 breann.  Final result was excellent with a residual of 0%.  400 mcg of intracoronary nitroglycerin was used postprocedure.  There was CHRISTA-3 flow through the vessel.     Impression: >90+% lesion noted in the left circumflex coronary artery and successful PCI with placement of a 2.5 x 18 mm Xience Breann stent and reduction stenosis to 0%.  80% lesion noted in the mid ramus intermedius coronary artery and successful PCI with placement of a 2.75 x 18 mm Xience Breann stent and reduction stenosis 0%  Left anterior descending coronary artery and right coronary artery were widely patent with minor luminal  irregularities.  Left ventriculogram was not performed.  Ascending aorta was ectatic/aneurysmal and to further delineate this a CT of the chest/aorta will be arranged.    Echocardiogram showed:  · The study is technically difficult for diagnosis.  · Estimated EF = 68%.  · Left ventricular systolic function is normal.  · Right ventricular cavity is mildly dilated.  · Left atrial cavity size is mildly dilated.  · There is calcification of the aortic valve. Not well seen. Cannot exclude Bicuspid AV.  · Mild aortic valve regurgitation is present.    CT FINDINGS: Heart size normal. No evidence of pathologically  enlarged nodes. Subtle fibrotic changes at both lung bases. Lungs  otherwise clear.      Dilated ascending aorta 4.71 cm series 2 image 54. Normal caliber  aortic arch 2.66 cm series 2 image 33. Normal descending aorta.  Normal caliber abdominal aorta at 1.8 cm series 2 image 123.      Transesophageal echocardiogram performed in December 2019 showed:  Impression: Bicuspid aortic valve with mild calcification with no evidence of aortic stenosis.  There was trace aortic valve insufficiency.  Mitral valve and tricuspid valve appeared structurally normal.  Normal left ventricular systolic function with no wall motion abnormalities.  Aortic root/ascending aorta was mildly dilated.  Aortic root measured 4.3 cm.    The patient has progressed well and denied any chest pain, shortness of breath, palpitation, dizziness or syncope.  He has been compliant with his medication but unfortunately continues to smoke.  We had a discussion about smoking cessation.  He had no clinical findings to suggest congestive heart failure.    No Known Allergies      Past Medical History:   Diagnosis Date   • Aortic aneurysm (CMS/HCC)    • Candidiasis of skin and nails 02/03/2016   • Chronic pain disorder    • Coronary artery disease    • Depression    • Dyslipidemia 07/28/2015   • Joint pain 02/03/2016    RIGHT SHOULDER   • Low back pain  "05/10/2016   • Muscle weakness 12/07/2015   • Pure hypercholesterolemia 07/28/2015   • Tobacco dependence syndrome 06/09/2015         Past Surgical History:   Procedure Laterality Date   • CARDIAC CATHETERIZATION N/A 10/14/2019    Procedure: Left Heart Cath/ PCI  if indicated;  Surgeon: Tobi Gary MD;  Location: [facility] CATH INVASIVE LOCATION;  Service: Cardiovascular         Family History   Problem Relation Age of Onset   • Diabetes Mother    • Migraines Sister    • Diabetes Maternal Grandmother          Social History     Socioeconomic History   • Marital status:      Spouse name: Not on file   • Number of children: Not on file   • Years of education: Not on file   • Highest education level: Not on file   Tobacco Use   • Smoking status: Current Every Day Smoker     Packs/day: 1.00     Types: Cigarettes   • Smokeless tobacco: Never Used   Substance and Sexual Activity   • Alcohol use: Yes   • Drug use: Yes     Types: Marijuana   • Sexual activity: Defer         Current Outpatient Medications   Medication Sig Dispense Refill   • aspirin (Aspirin Low Dose) 81 MG EC tablet Take 1 tablet by mouth Daily. 90 tablet 3   • cyclobenzaprine (FLEXERIL) 5 MG tablet TAKE 2 TABLETS BY MOUTH THREE TIMES DAILY AS NEEDED FOR MUSCLE SPASMS 90 tablet 2   • metoprolol tartrate (LOPRESSOR) 25 MG tablet Take 0.5 tablets by mouth Every 12 (Twelve) Hours. 90 tablet 3   • prasugrel (EFFIENT) 10 MG tablet TAKE 1 TABLET BY MOUTH EVERY DAY 30 tablet 6   • simvastatin (ZOCOR) 20 MG tablet TAKE 1 TABLET BY MOUTH EVERY NIGHT AT BEDTIME 30 tablet 5     No current facility-administered medications for this visit.          OBJECTIVE    /78 (BP Location: Left arm, Patient Position: Sitting, Cuff Size: Adult)   Pulse 77   Ht 177.8 cm (70\")   Wt 69.8 kg (153 lb 12.8 oz)   SpO2 98%   BMI 22.07 kg/m²         Review of Systems     Constitutional:  Denies recent weight loss, weight gain, fever or chills, no change in " exercise tolerance     HENT:  Denies any hearing loss, epistaxis, hoarseness, or difficulty speaking.     Eyes: Wears eyeglasses or contact lenses     Respiratory:  Denies dyspnea with exertion,no cough, wheezing, or hemoptysis.     Cardiovascular: Negative for palpations, chest pain, orthopnea, PND, peripheral edema, syncope, or claudication.     Gastrointestinal:  Denies change in bowel habits, dyspepsia, ulcer disease, hematochezia, or melena.     Endocrine: Negative for cold intolerance, heat intolerance, polydipsia, polyphagia and polyuria.     Genitourinary: Negative.      Musculoskeletal: DJD    Skin:  Denies any change in hair or nails, rashes, or skin lesions.     Allergic/Immunologic: Negative.  Negative for environmental allergies, food allergies and immunocompromised state.     Neurological:  Denies any history of recurrent headaches, strokes, TIA, or seizure disorder.     Hematological: Denies any food allergies, seasonal allergies, bleeding disorders, or lymphadenopathy.     Psychiatric/Behavioral: Denies any history of depression, substance abuse, or change in cognitive function.         Physical Exam     Constitutional: Cooperative, alert and oriented,  in no acute distress.     HENT:   Head: Normocephalic, normal hair patterns, no masses or tenderness.  Ears, Nose, and Throat: No gross abnormalities. No pallor or cyanosis. Dentition good.   Eyes: EOMS intact, PERRL, conjunctivae and lids unremarkable. Fundoscopic exam and visual fields not performed.   Neck: No palpable masses or adenopathy, no thyromegaly, no JVD, carotid pulses are full and equal bilaterally and without  Bruits.     Cardiovascular: Regular rhythm, S1 and S2 normal, no S3 or S4.  No murmurs, gallops, or rubs detected.     Pulmonary/Chest: Chest: normal symmetry,  normal respiratory excursion, no intercostal retraction, no use of accessory muscles.            Pulmonary: Normal breath sounds. No rales or ronchi.    Abdominal: Abdomen  soft, bowel sounds normoactive, no masses, no hepatosplenomegaly, non-tender, no bruits.     Musculoskeletal: No deformities, clubbing, cyanosis, erythema, or edema observed.     Neurological: No gross motor or sensory deficits noted, affect appropriate, oriented to time, person, place.     Skin: Warm and dry to the touch, no apparent skin lesions or masses noted.     Psychiatric: He has a normal mood and affect. His behavior is normal. Judgment and thought content normal.         Procedures      Lab Results   Component Value Date    WBC 9.43 12/05/2019    HGB 14.2 12/05/2019    HCT 41.8 12/05/2019    MCV 84.4 12/05/2019     12/05/2019     Lab Results   Component Value Date    GLUCOSE 106 (H) 12/05/2019    BUN 18 12/05/2019    CREATININE 0.72 (L) 12/05/2019    EGFRIFNONA 114 12/05/2019    BCR 25.0 12/05/2019    CO2 26.0 12/05/2019    CALCIUM 9.5 12/05/2019    ALBUMIN 4.70 10/12/2019    AST 33 10/12/2019    ALT 24 10/12/2019     Lab Results   Component Value Date    CHOL 182 10/15/2019    CHOL 224 (H) 05/29/2019    CHOL 255 (H) 11/30/2018     Lab Results   Component Value Date    TRIG 104 10/15/2019    TRIG 109 05/29/2019    TRIG 103 11/30/2018     Lab Results   Component Value Date    HDL 42 10/15/2019    HDL 43 05/29/2019    HDL 55 (L) 11/30/2018     No components found for: LDLCALC  Lab Results   Component Value Date     (H) 10/15/2019     (H) 05/29/2019     (H) 11/30/2018     No results found for: HDLLDLRATIO  No components found for: CHOLHDL  Lab Results   Component Value Date    HGBA1C 5.40 10/15/2019     Lab Results   Component Value Date    TSH 1.220 10/12/2019           ASSESSMENT AND PLAN  Mr. Nielsen is progressing well clinically with no evidence of angina, arrhythmia or congestive heart failure.  I have continued antiplatelet therapy with aspirin and Effient, lipid-lowering therapy with simvastatin.  Patient unfortunately continues to smoke and aggressive risk factor  modification and smoking cessation has been recommended.  To reassess bicuspid aortic valve and ascending aortic aneurysm and echocardiogram is being arranged.  Lab tests indicated below have been ordered.  I will consider referring him to vascular/CT surgery follow-up depending on the size of the ascending aortic aneurysm.    Diagnoses and all orders for this visit:    1. Ascending aortic aneurysm (CMS/HCC) (Primary)  -     Lipid Panel; Future  -     CBC & Differential  -     Comprehensive Metabolic Panel  -     Adult Transthoracic Echo Complete W/ Cont if Necessary Per Protocol; Future    2. Mixed hyperlipidemia  -     Lipid Panel; Future  -     CBC & Differential  -     Comprehensive Metabolic Panel  -     Adult Transthoracic Echo Complete W/ Cont if Necessary Per Protocol; Future    3. Coronary artery disease involving native coronary artery of native heart without angina pectoris  -     Lipid Panel; Future  -     CBC & Differential  -     Comprehensive Metabolic Panel  -     Adult Transthoracic Echo Complete W/ Cont if Necessary Per Protocol; Future    4. Tobacco dependence syndrome  -     Lipid Panel; Future  -     CBC & Differential  -     Comprehensive Metabolic Panel  -     Adult Transthoracic Echo Complete W/ Cont if Necessary Per Protocol; Future    5. Bicuspid aortic valve  -     Lipid Panel; Future  -     CBC & Differential  -     Comprehensive Metabolic Panel  -     Adult Transthoracic Echo Complete W/ Cont if Necessary Per Protocol; Future        Patient's Body mass index is 22.07 kg/m². BMI is within normal parameters. No follow-up required..      Tre Nielsen is a current cigarettes user.  He currently smokes 1 pack of cigarettes per day for a duration of 35 years. I have educated him on the risk of diseases from using tobacco products such as cancer, COPD and heart diease.       I spent 3  minutes counseling the patient.          Tobi Gary MD  1/5/2021  09:26 CST

## 2021-01-14 ENCOUNTER — OFFICE VISIT (OUTPATIENT)
Dept: FAMILY MEDICINE CLINIC | Facility: CLINIC | Age: 56
End: 2021-01-14

## 2021-01-14 ENCOUNTER — LAB (OUTPATIENT)
Dept: LAB | Facility: HOSPITAL | Age: 56
End: 2021-01-14

## 2021-01-14 ENCOUNTER — TELEPHONE (OUTPATIENT)
Dept: FAMILY MEDICINE CLINIC | Facility: CLINIC | Age: 56
End: 2021-01-14

## 2021-01-14 VITALS
OXYGEN SATURATION: 98 % | SYSTOLIC BLOOD PRESSURE: 110 MMHG | BODY MASS INDEX: 21.6 KG/M2 | WEIGHT: 150.9 LBS | HEART RATE: 72 BPM | DIASTOLIC BLOOD PRESSURE: 76 MMHG | HEIGHT: 70 IN

## 2021-01-14 DIAGNOSIS — M25.552 LEFT HIP PAIN: ICD-10-CM

## 2021-01-14 DIAGNOSIS — Z00.00 ANNUAL PHYSICAL EXAM: Primary | ICD-10-CM

## 2021-01-14 DIAGNOSIS — Z11.59 NEED FOR HEPATITIS C SCREENING TEST: ICD-10-CM

## 2021-01-14 DIAGNOSIS — E78.00 PURE HYPERCHOLESTEROLEMIA: ICD-10-CM

## 2021-01-14 DIAGNOSIS — F17.200 TOBACCO DEPENDENCE SYNDROME: ICD-10-CM

## 2021-01-14 DIAGNOSIS — Z23 NEED FOR INFLUENZA VACCINATION: ICD-10-CM

## 2021-01-14 DIAGNOSIS — Z23 NEED FOR SHINGLES VACCINE: ICD-10-CM

## 2021-01-14 DIAGNOSIS — Z13.29 SCREENING FOR HYPOTHYROIDISM: ICD-10-CM

## 2021-01-14 LAB
HCV AB SER DONR QL: NORMAL
TSH SERPL DL<=0.05 MIU/L-ACNC: 1.96 UIU/ML (ref 0.27–4.2)

## 2021-01-14 PROCEDURE — 99396 PREV VISIT EST AGE 40-64: CPT | Performed by: NURSE PRACTITIONER

## 2021-01-14 PROCEDURE — 90686 IIV4 VACC NO PRSV 0.5 ML IM: CPT | Performed by: NURSE PRACTITIONER

## 2021-01-14 PROCEDURE — 90472 IMMUNIZATION ADMIN EACH ADD: CPT | Performed by: NURSE PRACTITIONER

## 2021-01-14 PROCEDURE — 90471 IMMUNIZATION ADMIN: CPT | Performed by: NURSE PRACTITIONER

## 2021-01-14 PROCEDURE — 90750 HZV VACC RECOMBINANT IM: CPT | Performed by: NURSE PRACTITIONER

## 2021-01-14 PROCEDURE — 84443 ASSAY THYROID STIM HORMONE: CPT

## 2021-01-14 PROCEDURE — 86803 HEPATITIS C AB TEST: CPT

## 2021-01-14 RX ORDER — NICOTINE 21 MG/24HR
1 PATCH, TRANSDERMAL 24 HOURS TRANSDERMAL EVERY 24 HOURS
Qty: 30 PATCH | Refills: 0 | Status: SHIPPED | OUTPATIENT
Start: 2021-01-14 | End: 2021-05-20

## 2021-01-14 NOTE — PROGRESS NOTES
"Subjective   Tre Maren Nielsen is a 55 y.o. male.  Annual physical exam.  Has cholesterol and chronic back pain.  Back pain has improved since his surgery \"but my left hip is starting to hurt bad now.  There is something going on with my hip.\"  He smokes and would like to discuss possible smoking cessation options.  \"I have been smoking way too much.  I need to quit.\"    Back Pain  This is a chronic problem. The current episode started more than 1 year ago. The problem occurs intermittently. The problem is unchanged. The pain is present in the lumbar spine. The pain is at a severity of 3/10. The pain is mild. The symptoms are aggravated by position. Pertinent negatives include no fever, leg pain, numbness or tingling. He has tried muscle relaxant (Surgical intervention, Ibupofen) for the symptoms. The treatment provided moderate relief.   Hyperlipidemia  This is a chronic problem. The current episode started more than 1 year ago. The problem is controlled. Factors aggravating his hyperlipidemia include fatty foods and smoking. Pertinent negatives include no leg pain or shortness of breath. Current antihyperlipidemic treatment includes statins. The current treatment provides significant improvement of lipids. There are no compliance problems.  Risk factors for coronary artery disease include dyslipidemia, family history and male sex.   Hip Pain   There was no injury mechanism. The pain is present in the left hip. The quality of the pain is described as aching and shooting. The pain is at a severity of 9/10. The pain is severe. The pain has been worsening since onset. Pertinent negatives include no inability to bear weight, numbness or tingling. The symptoms are aggravated by weight bearing and movement. He has tried rest for the symptoms. The treatment provided mild relief.   Nicotine Dependence  Presents for follow-up visit. Symptoms are negative for fatigue and sore throat. His urge triggers include company of " smokers. The symptoms have been worsening. He smokes 2 packs of cigarettes per day. Compliance with prior treatments has been poor.        The following portions of the patient's history were reviewed and updated as appropriate:   He  has a past medical history of Aortic aneurysm (CMS/HCC), Candidiasis of skin and nails (02/03/2016), Chronic pain disorder, Coronary artery disease, Depression, Dyslipidemia (07/28/2015), Joint pain (02/03/2016), Low back pain (05/10/2016), Muscle weakness (12/07/2015), Pure hypercholesterolemia (07/28/2015), and Tobacco dependence syndrome (06/09/2015).  He does not have any pertinent problems on file.  He  has a past surgical history that includes Cardiac catheterization (N/A, 10/14/2019).  His family history includes Diabetes in his maternal grandmother and mother; Migraines in his sister.  He  reports that he has been smoking cigarettes. He has been smoking about 1.00 pack per day. He has never used smokeless tobacco. He reports current alcohol use. He reports current drug use. Drug: Marijuana.  Current Outpatient Medications   Medication Sig Dispense Refill   • aspirin (Aspirin Low Dose) 81 MG EC tablet Take 1 tablet by mouth Daily. 90 tablet 3   • cyclobenzaprine (FLEXERIL) 5 MG tablet TAKE 2 TABLETS BY MOUTH THREE TIMES DAILY AS NEEDED FOR MUSCLE SPASMS 90 tablet 2   • metoprolol tartrate (LOPRESSOR) 25 MG tablet Take 0.5 tablets by mouth Every 12 (Twelve) Hours. 90 tablet 3   • prasugrel (EFFIENT) 10 MG tablet TAKE 1 TABLET BY MOUTH EVERY DAY 30 tablet 6   • simvastatin (ZOCOR) 20 MG tablet TAKE 1 TABLET BY MOUTH EVERY NIGHT AT BEDTIME 30 tablet 5   • nicotine (NICODERM CQ) 21 MG/24HR patch Place 1 patch on the skin as directed by provider Daily. 30 patch 0     No current facility-administered medications for this visit.      Current Outpatient Medications on File Prior to Visit   Medication Sig   • aspirin (Aspirin Low Dose) 81 MG EC tablet Take 1 tablet by mouth Daily.   •  "cyclobenzaprine (FLEXERIL) 5 MG tablet TAKE 2 TABLETS BY MOUTH THREE TIMES DAILY AS NEEDED FOR MUSCLE SPASMS   • metoprolol tartrate (LOPRESSOR) 25 MG tablet Take 0.5 tablets by mouth Every 12 (Twelve) Hours.   • prasugrel (EFFIENT) 10 MG tablet TAKE 1 TABLET BY MOUTH EVERY DAY   • simvastatin (ZOCOR) 20 MG tablet TAKE 1 TABLET BY MOUTH EVERY NIGHT AT BEDTIME     No current facility-administered medications on file prior to visit.      He has No Known Allergies..    Review of Systems   Constitutional: Negative.  Negative for chills, diaphoresis, fatigue and fever.   HENT: Negative.  Negative for sore throat.    Eyes: Negative.    Respiratory: Negative.  Negative for cough and shortness of breath.    Cardiovascular: Negative.    Gastrointestinal: Negative.    Genitourinary: Negative.    Musculoskeletal: Positive for back pain.   Skin: Negative.    Neurological: Negative.  Negative for tingling and numbness.   Psychiatric/Behavioral: Negative.  Negative for confusion.       Objective    Visit Vitals  /76   Pulse 72   Ht 177.8 cm (70\")   Wt 68.4 kg (150 lb 14.4 oz)   SpO2 98%   BMI 21.65 kg/m²       Physical Exam  Vitals signs and nursing note reviewed.   Constitutional:       Appearance: Normal appearance. He is well-developed and normal weight.   HENT:      Head: Normocephalic and atraumatic.      Right Ear: Tympanic membrane, ear canal and external ear normal.      Left Ear: Tympanic membrane, ear canal and external ear normal.      Nose: Nose normal.      Mouth/Throat:      Mouth: Mucous membranes are moist.      Pharynx: Oropharynx is clear.   Eyes:      Extraocular Movements: Extraocular movements intact.      Conjunctiva/sclera: Conjunctivae normal.      Pupils: Pupils are equal, round, and reactive to light.   Neck:      Musculoskeletal: Normal range of motion and neck supple.   Cardiovascular:      Rate and Rhythm: Normal rate and regular rhythm.      Heart sounds: Normal heart sounds.   Pulmonary:      " Effort: Pulmonary effort is normal.      Breath sounds: Normal breath sounds.   Abdominal:      General: Bowel sounds are normal.      Palpations: Abdomen is soft.   Musculoskeletal: Normal range of motion.   Skin:     General: Skin is warm.      Capillary Refill: Capillary refill takes less than 2 seconds.   Neurological:      Mental Status: He is alert and oriented to person, place, and time.   Psychiatric:         Behavior: Behavior normal.         Assessment/Plan   Problems Addressed this Visit        Cardiac and Vasculature    Pure hypercholesterolemia       Musculoskeletal and Injuries    Left hip pain    Relevant Orders    XR Hip With or Without Pelvis 2 - 3 View Left       Tobacco    Tobacco dependence syndrome    Relevant Medications    nicotine (NICODERM CQ) 21 MG/24HR patch      Other Visit Diagnoses     Annual physical exam    -  Primary    Need for hepatitis C screening test        Relevant Orders    Hepatitis C Antibody    Screening for hypothyroidism        Relevant Orders    TSH    Need for shingles vaccine        Relevant Orders    Shingrix Vaccine (Completed)    Need for influenza vaccination        Relevant Orders    FluLaval Quad >6 Months (3357-2358) (Completed)      Diagnoses       Codes Comments    Annual physical exam    -  Primary ICD-10-CM: Z00.00  ICD-9-CM: V70.0     Pure hypercholesterolemia     ICD-10-CM: E78.00  ICD-9-CM: 272.0     Left hip pain     ICD-10-CM: M25.552  ICD-9-CM: 719.45     Tobacco dependence syndrome     ICD-10-CM: F17.200  ICD-9-CM: 305.1     Need for hepatitis C screening test     ICD-10-CM: Z11.59  ICD-9-CM: V73.89     Screening for hypothyroidism     ICD-10-CM: Z13.29  ICD-9-CM: V77.0     Need for shingles vaccine     ICD-10-CM: Z23  ICD-9-CM: V04.89     Need for influenza vaccination     ICD-10-CM: Z23  ICD-9-CM: V04.81         New Medications Ordered This Visit   Medications   • nicotine (NICODERM CQ) 21 MG/24HR patch     Sig: Place 1 patch on the skin as directed  by provider Daily.     Dispense:  30 patch     Refill:  0     1.  Annual physical exam:  Continue on current medications as previously prescribed   Anticipatory guidance and counseling discussed with patient including importance of regular physical activity regimen and healthy eating habits  Return in about 6 months (around 7/14/2021) for Recheck.    2.  Pure hypercholesterolemia:  Continue on Zocor as previously prescribed  Continue to adhere to a diet low in saturated fats    3.  Left hip pain:  Complete x-ray of the left hip as ordered and will notify of results when available  May use Tylenol OTC according to package directions for pain    4.  Tobacco dependence syndrome:  Begin nicotine patches as prescribed  Educated on possible side effects of this medication  Educated on importance of changing sites of patch at each application and never to apply more than one patch at a time  Strongly encouraged not to smoke while wearing the patch as this could result in a nicotine overdose  Tre Nielsen  reports that he has been smoking cigarettes. He has been smoking about 1.00 pack per day. He has never used smokeless tobacco.. I have educated him on the risk of diseases from using tobacco products such as cancer, COPD and heart disease.   I advised him to quit and he is willing to quit. We have discussed the following method/s for tobacco cessation:  Education Material OTC Cessation Products.  Together we have set a quit date for 1 month from today.  He will follow up with me in 6 months or sooner to check on his progress.  I spent 4.5 minutes counseling the patient.    5.  Need for hepatitis C screening test:  Pleat hepatitis C antibody as ordered and will notify of results when available    6.  Screening for hypothyroidism:  Complete TSH as ordered and will notify of results when available    7.  Need for shingles vaccine:  Shingrix vaccine given IM in office  Educated on possible side effects of this  medication including not limited to increased risk for pain, swelling and redness of injection site  Educated on importance of completing second dose of vaccine therapy in no sooner than 2 months but no greater than 6 months    8.  Need for influenza vaccination:  Influenza vaccine given IM in office  Educated on possible side effects of this medication including but not limited to pain, swelling and redness of injection site as well as flulike symptoms        This document has been electronically signed by LINDA Milan on January 14, 2021 12:16 CST

## 2021-01-14 NOTE — TELEPHONE ENCOUNTER
Per LINDA Raman, Mr. Nielsen has been called with recent Bilateral Hip x-ray  results & recommendations.  Continue current medications and follow-up as planned or sooner if any problems.     Danisha,      He states he has been taking Ibuprofen for the pain and it has helped some.      ----- Message from LINDA Milan sent at 1/14/2021 12:55 PM CST -----  X-ray does show mild degenerative changes of both hips.  Could you ask her what he has been taking for pain?  Has it been helping?

## 2021-01-20 ENCOUNTER — DOCUMENTATION (OUTPATIENT)
Dept: CARDIOLOGY | Facility: CLINIC | Age: 56
End: 2021-01-20

## 2021-01-20 ENCOUNTER — TELEPHONE (OUTPATIENT)
Dept: FAMILY MEDICINE CLINIC | Facility: CLINIC | Age: 56
End: 2021-01-20

## 2021-01-20 DIAGNOSIS — I71.21 ASCENDING AORTIC ANEURYSM (HCC): Primary | ICD-10-CM

## 2021-01-21 NOTE — TELEPHONE ENCOUNTER
Per LINDA Raman, Mr Nielsen has been called with recent lab results and recommendations  Continue current medications and follow-up as planned or sooner if any problems.      .  ----- Message from LINDA Milan sent at 1/15/2021  7:50 AM CST -----  Labs normal, please call or send card!

## 2021-01-21 NOTE — TELEPHONE ENCOUNTER
Per LINDA Raman, Mr Nielsen has been called with new recommendations to switch his medication to Tylenol Arthritis   Continue current medications and follow-up as planned or sooner if any problems.    He does not wish to see Ortho at this time      ----- Message from LINDA Milan sent at 1/15/2021  7:05 AM CST -----  Please have him switch over to Tylenol arthritis for his pain.  That will probably help better for the bone pain.  If he would like I can send in a referral to orthopedics for further evaluation.  ----- Message -----  From: Catie Velasco LPN  Sent: 1/14/2021   4:39 PM CST  To: LINDA Milan    Per LINDA Raman, Mr. Nielsen has been called with recent Bilateral Hip x-ray  results & recommendations.  Continue current medications and follow-up as planned or sooner if any problems.    Danisha,     He states he has been taking Ibuprofen for the pain and it has helped some.

## 2021-01-23 DIAGNOSIS — E78.2 MIXED HYPERLIPIDEMIA: ICD-10-CM

## 2021-01-25 RX ORDER — SIMVASTATIN 20 MG
TABLET ORAL
Qty: 30 TABLET | Refills: 5 | Status: SHIPPED | OUTPATIENT
Start: 2021-01-25 | End: 2021-07-22

## 2021-01-26 DIAGNOSIS — I71.21 ASCENDING AORTIC ANEURYSM (HCC): Primary | ICD-10-CM

## 2021-01-27 ENCOUNTER — HOSPITAL ENCOUNTER (OUTPATIENT)
Dept: CT IMAGING | Facility: HOSPITAL | Age: 56
Discharge: HOME | End: 2021-01-27
Admitting: INTERNAL MEDICINE

## 2021-01-27 PROCEDURE — 0 IOPAMIDOL PER 1 ML: Performed by: INTERNAL MEDICINE

## 2021-01-27 PROCEDURE — 71275 CT ANGIOGRAPHY CHEST: CPT

## 2021-01-27 RX ADMIN — IOPAMIDOL 90 ML: 755 INJECTION, SOLUTION INTRAVENOUS at 08:42

## 2021-01-29 ENCOUNTER — DOCUMENTATION (OUTPATIENT)
Dept: CARDIOLOGY | Facility: CLINIC | Age: 56
End: 2021-01-29

## 2021-01-29 NOTE — PROGRESS NOTES
CTA chest results per Dr Howard    Aorta measuring smaller than on echo    Recommendations per Dr Howard    Optimal Blood pressure monitoring and control    Patient notified. Proper blood pressure monitoring and reporting discussed.

## 2021-02-21 DIAGNOSIS — G89.29 CHRONIC BILATERAL LOW BACK PAIN WITHOUT SCIATICA: ICD-10-CM

## 2021-02-21 DIAGNOSIS — M54.50 CHRONIC BILATERAL LOW BACK PAIN WITHOUT SCIATICA: ICD-10-CM

## 2021-02-22 RX ORDER — CYCLOBENZAPRINE HCL 5 MG
TABLET ORAL
Qty: 90 TABLET | Refills: 2 | OUTPATIENT
Start: 2021-02-22 | End: 2021-04-26

## 2021-03-06 DIAGNOSIS — I25.10 CORONARY ARTERY DISEASE INVOLVING NATIVE CORONARY ARTERY OF NATIVE HEART WITHOUT ANGINA PECTORIS: Primary | ICD-10-CM

## 2021-04-01 ENCOUNTER — CLINICAL SUPPORT (OUTPATIENT)
Dept: FAMILY MEDICINE CLINIC | Facility: CLINIC | Age: 56
End: 2021-04-01

## 2021-04-01 DIAGNOSIS — Z23 NEED FOR SHINGLES VACCINE: Primary | ICD-10-CM

## 2021-04-01 PROCEDURE — 90750 HZV VACC RECOMBINANT IM: CPT | Performed by: NURSE PRACTITIONER

## 2021-04-01 PROCEDURE — 96372 THER/PROPH/DIAG INJ SC/IM: CPT | Performed by: NURSE PRACTITIONER

## 2021-05-03 DIAGNOSIS — M54.50 CHRONIC BILATERAL LOW BACK PAIN WITHOUT SCIATICA: ICD-10-CM

## 2021-05-03 DIAGNOSIS — G89.29 CHRONIC BILATERAL LOW BACK PAIN WITHOUT SCIATICA: ICD-10-CM

## 2021-05-03 RX ORDER — CYCLOBENZAPRINE HCL 5 MG
TABLET ORAL
Qty: 90 TABLET | Refills: 2 | Status: SHIPPED | OUTPATIENT
Start: 2021-05-03 | End: 2021-07-08 | Stop reason: SDUPTHER

## 2021-05-03 NOTE — TELEPHONE ENCOUNTER
Danisha's patient    Last OV 1/14/2021    Next [facility] 05/20/2021 & 07/14/2021    He had been on Flexeril 5 mg but was recently changed to Flexeril 10 mg #21 Take 1 tab TID PRN.   Change was made by Dr. Bhakta.    Should he go back to the Flexeril 5 mg TID, PRN ans DC the Flexeril 10 mg??

## 2021-05-20 ENCOUNTER — OFFICE VISIT (OUTPATIENT)
Dept: FAMILY MEDICINE CLINIC | Facility: CLINIC | Age: 56
End: 2021-05-20

## 2021-05-20 VITALS
WEIGHT: 144 LBS | SYSTOLIC BLOOD PRESSURE: 108 MMHG | BODY MASS INDEX: 20.62 KG/M2 | HEIGHT: 70 IN | DIASTOLIC BLOOD PRESSURE: 76 MMHG

## 2021-05-20 DIAGNOSIS — M54.41 ACUTE BILATERAL LOW BACK PAIN WITH RIGHT-SIDED SCIATICA: Primary | ICD-10-CM

## 2021-05-20 PROCEDURE — 99213 OFFICE O/P EST LOW 20 MIN: CPT | Performed by: NURSE PRACTITIONER

## 2021-05-20 PROCEDURE — 96372 THER/PROPH/DIAG INJ SC/IM: CPT | Performed by: NURSE PRACTITIONER

## 2021-05-20 RX ORDER — KETOROLAC TROMETHAMINE 30 MG/ML
60 INJECTION, SOLUTION INTRAMUSCULAR; INTRAVENOUS ONCE
Status: COMPLETED | OUTPATIENT
Start: 2021-05-20 | End: 2021-05-20

## 2021-05-20 RX ORDER — TRIAMCINOLONE ACETONIDE 40 MG/ML
80 INJECTION, SUSPENSION INTRA-ARTICULAR; INTRAMUSCULAR ONCE
Status: COMPLETED | OUTPATIENT
Start: 2021-05-20 | End: 2021-05-20

## 2021-05-20 RX ADMIN — TRIAMCINOLONE ACETONIDE 80 MG: 40 INJECTION, SUSPENSION INTRA-ARTICULAR; INTRAMUSCULAR at 16:17

## 2021-05-20 RX ADMIN — KETOROLAC TROMETHAMINE 60 MG: 30 INJECTION, SOLUTION INTRAMUSCULAR; INTRAVENOUS at 16:17

## 2021-05-25 ENCOUNTER — TELEPHONE (OUTPATIENT)
Dept: FAMILY MEDICINE CLINIC | Facility: CLINIC | Age: 56
End: 2021-05-25

## 2021-05-25 NOTE — TELEPHONE ENCOUNTER
Per LINDA Raman, Mr. Nielsen has been called with recent Lumbar Spine x-ray results & recommendations.  Continue current medications and follow-up as planned or sooner if any problems.     ----- Message from LINDA Milan sent at 5/24/2021  7:03 AM CDT -----  X-ray of the lumbar spine showed moderate L1-L2 and mild L4-L5 degenerative disc disease with space narrowing.  If pain continues he needs to contact his neurosurgeon that did his surgery and schedule an appointment.

## 2021-06-23 RX ORDER — PRASUGREL 10 MG/1
TABLET, FILM COATED ORAL
Qty: 90 TABLET | Refills: 3 | Status: SHIPPED | OUTPATIENT
Start: 2021-06-23 | End: 2022-04-04

## 2021-06-23 RX ORDER — ASPIRIN 81 MG/1
TABLET ORAL
Qty: 90 TABLET | Refills: 3 | Status: SHIPPED | OUTPATIENT
Start: 2021-06-23 | End: 2022-08-08

## 2021-07-02 ENCOUNTER — OFFICE VISIT (OUTPATIENT)
Dept: CARDIOLOGY | Facility: CLINIC | Age: 56
End: 2021-07-02

## 2021-07-02 VITALS
SYSTOLIC BLOOD PRESSURE: 120 MMHG | WEIGHT: 143.8 LBS | TEMPERATURE: 98 F | DIASTOLIC BLOOD PRESSURE: 72 MMHG | HEIGHT: 70 IN | HEART RATE: 82 BPM | BODY MASS INDEX: 20.59 KG/M2 | OXYGEN SATURATION: 99 %

## 2021-07-02 DIAGNOSIS — I25.10 CORONARY ARTERY DISEASE INVOLVING NATIVE CORONARY ARTERY OF NATIVE HEART WITHOUT ANGINA PECTORIS: Primary | ICD-10-CM

## 2021-07-02 DIAGNOSIS — Q23.1 BICUSPID AORTIC VALVE: ICD-10-CM

## 2021-07-02 DIAGNOSIS — I71.21 ASCENDING AORTIC ANEURYSM (HCC): ICD-10-CM

## 2021-07-02 DIAGNOSIS — E78.5 DYSLIPIDEMIA: ICD-10-CM

## 2021-07-02 DIAGNOSIS — E78.2 MIXED HYPERLIPIDEMIA: ICD-10-CM

## 2021-07-02 PROCEDURE — 99213 OFFICE O/P EST LOW 20 MIN: CPT | Performed by: INTERNAL MEDICINE

## 2021-07-02 NOTE — PROGRESS NOTES
Tre Nielsen  55 y.o. male    1. Coronary artery disease involving native coronary artery of native heart without angina pectoris    2. Dyslipidemia    3. Bicuspid aortic valve    4. Ascending aortic aneurysm (CMS/HCC)    5. Mixed hyperlipidemia        History of Present Illness  Mr. Nielsen is a 55-year-old  male with multiple risk factors for coronary artery disease including tobacco use, hyperlipidemia, positive family history for CAD who presented in 10/ 2019 with a rather unusual combination of vague chest discomfort, nausea, back pain associated with jerky movements in the left upper and lower extremities. EKG showed no acute ST-T wave changes but cardiac enzymes were elevated suggesting a non-ST segment elevation myocardial infarction.  Catheterization was performed and interventions performed as described below   >90+% lesion noted in the left circumflex coronary artery and successful PCI with placement of a 2.5 x 18 mm Xience Breann stent and reduction stenosis to 0%.  80% lesion noted in the mid ramus intermedius coronary artery and successful PCI with placement of a 2.75 x 18 mm Xience Breann stent and reduction stenosis 0%  Left anterior descending coronary artery and right coronary artery were widely patent with minor luminal irregularities.  Left ventriculogram was not performed.  Ascending aorta was ectatic/aneurysmal and to further evaluation by CT scan of the chest showed:    CT FINDINGS: Heart size normal. No evidence of pathologically  enlarged nodes. Subtle fibrotic changes at both lung bases. Lungs  otherwise clear.   Dilated ascending aorta 4.71 cm series 2 image 54. Normal caliber  aortic arch 2.66 cm series 2 image 33. Normal descending aorta.  Normal caliber abdominal aorta at 1.8 cm series 2 image 123.      Transthoracic echocardiogram raised the question of bicuspid aortic valve and hence a LAURENCE was performed in December 2019  Impression: Bicuspid aortic valve with mild  calcification with no evidence of aortic stenosis.  There was trace aortic valve insufficiency.  Mitral valve and tricuspid valve appeared structurally normal.  Normal left ventricular systolic function with no wall motion abnormalities.  Aortic root/ascending aorta was mildly dilated.  Aortic root measured 4.3 cm.    Echocardiogram in January 2021 showed:  · Estimated left ventricular EF = 64% Left ventricular ejection fraction appears to be 61 - 65%. Left ventricular systolic function is normal.  · Left ventricular diastolic function is consistent with (grade I) impaired relaxation.  · The aortic valve is abnormal in structure. A bicuspid aortic valve. There is mild calcification of the aortic valve. Mild aortic valve regurgitation is present. No hemodynamically significant aortic valve stenosis is present.  · Estimated right ventricular systolic pressure from tricuspid regurgitation is normal (<35 mmHg).  · Moderate dilation of the proximal aorta is present (4.8-4.9 cms)    Subsequent CT angiogram of the chest showed:  Aneurysmal dilatation of the ascending thoracic aorta measuring  4.4 cm in diameter, unchanged compared to the prior study from  10/14/2019.     The patient has progressed well and denied any chest pain, shortness of breath, palpitation or dizziness.  Unfortunately continues to smoke and we had a discussion about smoking cessation.  Clinical exam today did not reveal any signs of congestive heart failure or arrhythmia.  Heart rate and blood pressure were in the normal range.    No Known Allergies      Past Medical History:   Diagnosis Date   • Aortic aneurysm (CMS/HCC)    • Candidiasis of skin and nails 02/03/2016   • Chronic pain disorder    • Coronary artery disease    • Depression    • Dyslipidemia 07/28/2015   • Joint pain 02/03/2016    RIGHT SHOULDER   • Low back pain 05/10/2016   • Muscle weakness 12/07/2015   • Pure hypercholesterolemia 07/28/2015   • Tobacco dependence syndrome 06/09/2015  "        Past Surgical History:   Procedure Laterality Date   • CARDIAC CATHETERIZATION N/A 10/14/2019    Procedure: Left Heart Cath/ PCI  if indicated;  Surgeon: Tobi Gary MD;  Location: [facility] CATH INVASIVE LOCATION;  Service: Cardiovascular         Family History   Problem Relation Age of Onset   • Diabetes Mother    • Migraines Sister    • Diabetes Maternal Grandmother          Social History     Socioeconomic History   • Marital status:      Spouse name: Not on file   • Number of children: Not on file   • Years of education: Not on file   • Highest education level: Not on file   Tobacco Use   • Smoking status: Current Every Day Smoker     Packs/day: 1.00     Types: Cigarettes   • Smokeless tobacco: Never Used   Substance and Sexual Activity   • Alcohol use: Yes   • Drug use: Yes     Types: Marijuana   • Sexual activity: Defer         Current Outpatient Medications   Medication Sig Dispense Refill   • aspirin 81 MG EC tablet TAKE 1 TABLET BY MOUTH EVERY DAY 90 tablet 3   • cyclobenzaprine (FLEXERIL) 5 MG tablet TAKE 2 TABLETS BY MOUTH THREE TIMES DAILY AS NEEDED FOR MUSCLE SPASMS 90 tablet 2   • metoprolol tartrate (LOPRESSOR) 25 MG tablet TAKE 1/2 TABLET BY MOUTH EVERY 12 HOURS 90 tablet 3   • prasugrel (EFFIENT) 10 MG tablet TAKE 1 TABLET BY MOUTH EVERY DAY 90 tablet 3   • simvastatin (ZOCOR) 20 MG tablet TAKE 1 TABLET BY MOUTH EVERY NIGHT AT BEDTIME 30 tablet 5     No current facility-administered medications for this visit.         OBJECTIVE    /72 (BP Location: Left arm, Patient Position: Sitting, Cuff Size: Adult)   Pulse 82   Temp 98 °F (36.7 °C)   Ht 177.8 cm (70\")   Wt 65.2 kg (143 lb 12.8 oz)   SpO2 99%   BMI 20.63 kg/m²         Review of Systems : The following systems are reviewed and no changes noted    Constitutional:  Denies recent weight loss, weight gain, fever or chills, no change in exercise tolerance     HENT:  Denies any hearing loss, epistaxis, " hoarseness, or difficulty speaking.     Eyes: Wears eyeglasses or contact lenses     Respiratory:  Denies dyspnea with exertion,no cough, wheezing, or hemoptysis.     Cardiovascular: Negative for palpations, chest pain, orthopnea, PND, peripheral edema, syncope, or claudication.     Gastrointestinal:  Denies change in bowel habits, dyspepsia, ulcer disease, hematochezia, or melena.     Endocrine: Negative for cold intolerance, heat intolerance, polydipsia, polyphagia and polyuria.     Genitourinary: Negative.      Musculoskeletal: DJD    Neurological:  Denies any history of recurrent headaches, strokes, TIA, or seizure disorder.     Hematological: Denies any food allergies, seasonal allergies, bleeding disorders, or lymphadenopathy.     Psychiatric/Behavioral: Denies any history of depression, substance abuse, or change in cognitive function.       Physical Exam : The following systems were reassessed and no changes noted    Constitutional: Cooperative, alert and oriented,  in no acute distress.     HENT:   Head: Normocephalic, normal hair patterns, no masses or tenderness.  Ears, Nose, and Throat: No gross abnormalities. No pallor or cyanosis. Dentition good.   Eyes: EOMS intact, PERRL, conjunctivae and lids unremarkable. Fundoscopic exam and visual fields not performed.   Neck: No palpable masses or adenopathy, no thyromegaly, no JVD, carotid pulses are full and equal bilaterally and without  Bruits.     Cardiovascular: Regular rhythm, S1 and S2 normal, no S3 or S4.  No murmurs, gallops, or rubs detected.     Pulmonary/Chest: Chest: normal symmetry,  normal respiratory excursion, no intercostal retraction, no use of accessory muscles.            Pulmonary: Normal breath sounds. No rales or ronchi.    Abdominal: Abdomen soft, bowel sounds normoactive, no masses, no hepatosplenomegaly, non-tender, no bruits.     Musculoskeletal: No deformities, clubbing, cyanosis, erythema, or edema observed.     Neurological: No  gross motor or sensory deficits noted, affect appropriate, oriented to time, person, place.     Skin: Warm and dry to the touch, no apparent skin lesions or masses noted.     Psychiatric: He has a normal mood and affect. His behavior is normal. Judgment and thought content normal.         Procedures      Lab Results   Component Value Date    WBC 7.23 01/05/2021    HGB 15.1 01/05/2021    HCT 44.1 01/05/2021    MCV 85.1 01/05/2021     01/05/2021     Lab Results   Component Value Date    GLUCOSE 82 01/05/2021    BUN 14 01/05/2021    CREATININE 0.70 (L) 01/05/2021    EGFRIFNONA 118 01/05/2021    BCR 20.0 01/05/2021    CO2 27.2 01/05/2021    CALCIUM 9.0 01/05/2021    ALBUMIN 4.50 01/05/2021    AST 16 01/05/2021    ALT 12 01/05/2021     Lab Results   Component Value Date    CHOL 138 01/05/2021    CHOL 182 10/15/2019    CHOL 224 (H) 05/29/2019     Lab Results   Component Value Date    TRIG 82 01/05/2021    TRIG 104 10/15/2019    TRIG 109 05/29/2019     Lab Results   Component Value Date    HDL 45 01/05/2021    HDL 42 10/15/2019    HDL 43 05/29/2019     No components found for: LDLCALC  Lab Results   Component Value Date    LDL 77 01/05/2021     (H) 10/15/2019     (H) 05/29/2019     No results found for: HDLLDLRATIO  No components found for: CHOLHDL  Lab Results   Component Value Date    HGBA1C 5.40 10/15/2019     Lab Results   Component Value Date    TSH 1.960 01/14/2021           ASSESSMENT AND PLAN  Mr. Nielsen is progressing well clinically with no evidence of angina, arrhythmia or congestive heart failure.   I have continued antiplatelet therapy with aspirin and Effient, lipid-lowering therapy with simvastatin.   Patient unfortunately continues to smoke and aggressive risk factor modification and smoking cessation has been recommended.  The patient had several questions with regards to his aortic valve issue and aortic aneurysm and these were discussed with him in detail.  They appear clinically  stable at this time.  He will be followed closely.      Diagnoses and all orders for this visit:    1. Coronary artery disease involving native coronary artery of native heart without angina pectoris (Primary)    2. Dyslipidemia    3. Bicuspid aortic valve    4. Ascending aortic aneurysm (CMS/HCC)    5. Mixed hyperlipidemia        Patient's Body mass index is 20.63 kg/m². BMI is within normal parameters. No follow-up required..      Tre Nielsen is a current cigarettes user.  He currently smokes 1 pack of cigarettes per day for a duration of 35 years. I have educated him on the risk of diseases from using tobacco products such as cancer, COPD and heart diease.       I spent 3  minutes counseling the patient.          Tobi Gary MD  7/2/2021  09:42 CDT

## 2021-07-08 DIAGNOSIS — G89.29 CHRONIC BILATERAL LOW BACK PAIN WITHOUT SCIATICA: ICD-10-CM

## 2021-07-08 DIAGNOSIS — M54.50 CHRONIC BILATERAL LOW BACK PAIN WITHOUT SCIATICA: ICD-10-CM

## 2021-07-08 RX ORDER — CYCLOBENZAPRINE HCL 5 MG
TABLET ORAL
Qty: 90 TABLET | Refills: 2 | Status: SHIPPED | OUTPATIENT
Start: 2021-07-08 | End: 2021-07-28 | Stop reason: SDUPTHER

## 2021-07-22 DIAGNOSIS — E78.2 MIXED HYPERLIPIDEMIA: ICD-10-CM

## 2021-07-22 RX ORDER — SIMVASTATIN 20 MG
TABLET ORAL
Qty: 90 TABLET | Refills: 3 | Status: SHIPPED | OUTPATIENT
Start: 2021-07-22 | End: 2022-07-29 | Stop reason: SDUPTHER

## 2021-07-28 ENCOUNTER — OFFICE VISIT (OUTPATIENT)
Dept: FAMILY MEDICINE CLINIC | Facility: CLINIC | Age: 56
End: 2021-07-28

## 2021-07-28 VITALS
BODY MASS INDEX: 21.1 KG/M2 | HEIGHT: 70 IN | WEIGHT: 147.4 LBS | DIASTOLIC BLOOD PRESSURE: 68 MMHG | SYSTOLIC BLOOD PRESSURE: 110 MMHG

## 2021-07-28 DIAGNOSIS — Z13.29 SCREENING FOR HYPOTHYROIDISM: ICD-10-CM

## 2021-07-28 DIAGNOSIS — I25.10 CORONARY ARTERY DISEASE INVOLVING NATIVE CORONARY ARTERY OF NATIVE HEART WITHOUT ANGINA PECTORIS: Primary | ICD-10-CM

## 2021-07-28 DIAGNOSIS — M54.50 CHRONIC BILATERAL LOW BACK PAIN WITHOUT SCIATICA: ICD-10-CM

## 2021-07-28 DIAGNOSIS — G89.29 CHRONIC BILATERAL LOW BACK PAIN WITHOUT SCIATICA: ICD-10-CM

## 2021-07-28 DIAGNOSIS — Z23 NEED FOR PROPHYLACTIC VACCINATION WITH COMBINED DIPHTHERIA-TETANUS-PERTUSSIS (DTP) VACCINE: ICD-10-CM

## 2021-07-28 DIAGNOSIS — E78.00 PURE HYPERCHOLESTEROLEMIA: ICD-10-CM

## 2021-07-28 PROCEDURE — 99213 OFFICE O/P EST LOW 20 MIN: CPT | Performed by: NURSE PRACTITIONER

## 2021-07-28 PROCEDURE — 90715 TDAP VACCINE 7 YRS/> IM: CPT | Performed by: NURSE PRACTITIONER

## 2021-07-28 PROCEDURE — 90471 IMMUNIZATION ADMIN: CPT | Performed by: NURSE PRACTITIONER

## 2021-07-28 RX ORDER — CYCLOBENZAPRINE HCL 5 MG
TABLET ORAL
Qty: 90 TABLET | Refills: 2 | Status: SHIPPED | OUTPATIENT
Start: 2021-07-28 | End: 2021-10-04 | Stop reason: SDUPTHER

## 2021-07-28 NOTE — PROGRESS NOTES
"Chief Complaint  Back Pain (2 month check up, low back pain)    Subjective          Tre Nielsen presents to [facility]  Back Pain  This is a chronic problem. The current episode started more than 1 year ago. The problem occurs intermittently. The problem is unchanged. The pain is present in the lumbar spine. The pain is at a severity of 3/10. The pain is mild. The symptoms are aggravated by position. Pertinent negatives include no chest pain or leg pain. He has tried muscle relaxant (Surgical intervention, Ibupofen) for the symptoms. The treatment provided moderate relief.   Hyperlipidemia  This is a chronic problem. The current episode started more than 1 year ago. The problem is controlled. Factors aggravating his hyperlipidemia include fatty foods and smoking. Pertinent negatives include no chest pain, leg pain or shortness of breath. Current antihyperlipidemic treatment includes statins. The current treatment provides significant improvement of lipids. There are no compliance problems.  Risk factors for coronary artery disease include dyslipidemia, family history and male sex.   Coronary Artery Disease  Presents for follow-up visit. Pertinent negatives include no chest pain, chest pressure, chest tightness or shortness of breath. Risk factors include hyperlipidemia. The symptoms have been stable. Compliance with diet is good. Compliance with exercise is good. Compliance with medications is good.       Objective   Vital Signs:   /68   Ht 177.8 cm (70\")   Wt 66.9 kg (147 lb 6.4 oz)   BMI 21.15 kg/m²     Physical Exam  Vitals and nursing note reviewed.   Constitutional:       Appearance: Normal appearance. He is well-developed.   Eyes:      Pupils: Pupils are equal, round, and reactive to light.   Cardiovascular:      Rate and Rhythm: Normal rate and regular rhythm.      Heart sounds: Normal heart sounds.   Pulmonary:      Effort: Pulmonary effort is normal.      " Breath sounds: Normal breath sounds.   Abdominal:      General: Bowel sounds are normal.      Palpations: Abdomen is soft.   Musculoskeletal:         General: Normal range of motion.      Cervical back: Normal range of motion and neck supple.   Skin:     General: Skin is warm.      Capillary Refill: Capillary refill takes less than 2 seconds.   Neurological:      Mental Status: He is alert and oriented to person, place, and time.   Psychiatric:         Behavior: Behavior normal.        Result Review :     Common labs    Common Labsle 1/5/21 1/5/21 1/5/21    [id number]   Glucose  82    BUN  14    Creatinine  0.70 (A)    eGFR Non African Am  118    Sodium  140    Potassium  4.1    Chloride  105    Calcium  9.0    Albumin  4.50    Total Bilirubin  0.3    Alkaline Phosphatase  72    AST (SGOT)  16    ALT (SGPT)  12    WBC 7.23     Hemoglobin 15.1     Hematocrit 44.1     Platelets 291     Total Cholesterol   138   Triglycerides   82   HDL Cholesterol   45   LDL Cholesterol    77   (A) Abnormal value                      Assessment and Plan    Diagnoses and all orders for this visit:    1. Coronary artery disease involving native coronary artery of native heart without angina pectoris (Primary)  -     CBC & Differential; Future  -     Comprehensive Metabolic Panel; Future    2. Pure hypercholesterolemia  -     Lipid Panel; Future    3. Chronic bilateral low back pain without sciatica  -     cyclobenzaprine (FLEXERIL) 5 MG tablet; TAKE 2 TABLETS BY MOUTH THREE TIMES DAILY AS NEEDED FOR MUSCLE SPASMS  Dispense: 90 tablet; Refill: 2    4. Need for prophylactic vaccination with combined diphtheria-tetanus-pertussis (DTP) vaccine  -     Tdap Vaccine Greater Than or Equal To 8yo IM    5. Screening for hypothyroidism  -     TSH; Future      1.  Coronary artery disease involving native coronary artery of native heart without angina pectoris:  Complete CBC and chemistry panel as ordered and will notify of results when  available  Continue on baby aspirin and Effient as previously prescribed  Reeducated on possible side effects of this medication including not limited to increased risk for spontaneous bleeding  Continue cardiology follow-up with Dr. Gary as scheduled in January 2022  Seek emergency medical treatment for any new or worsening chest pain, chest tightness or palpitations    2.  Pure hypercholesterolemia:  Complete fasting lipid panel as ordered and notify of results when available.  Continue to adhere to a low-fat diet    3.  Chronic bilateral low back pain without sciatica:  Continue on Flexeril as previously prescribed and refill prescription sent to pharmacy  Reeducated on possible side effects of this medication including not limited to increased risk for drowsiness  Encouraged not to take this medication operate motor vehicle as this may impair reaction time    4.  Need for prophylactic vaccination with combined diphtheria tetanus pertussis vaccine:  Tdap vaccine given IM in office  Educated on possible side effects of this medication including not limited to increased risk for pain, swelling and redness of injection site  Educated that vaccine will last approximately 10 years    5.  Screening for hypothyroidism:  Complete TSH as ordered and will notify of results when available    I spent 24 minutes caring for Tre on this date of service. This time includes time spent by me in the following activities:preparing for the visit, reviewing tests, obtaining and/or reviewing a separately obtained history, performing a medically appropriate examination and/or evaluation , counseling and educating the patient/family/caregiver, ordering medications, tests, or procedures and documenting information in the medical record  Follow Up   Return in about 6 months (around 1/28/2022) for Annual physical.  Patient was given instructions and counseling regarding his condition or for health maintenance advice. Please see  specific information pulled into the AVS if appropriate.         This document has been electronically signed by LINDA Milan on July 28, 2021 13:09 CDT

## 2021-10-04 DIAGNOSIS — M54.50 CHRONIC BILATERAL LOW BACK PAIN WITHOUT SCIATICA: ICD-10-CM

## 2021-10-04 DIAGNOSIS — G89.29 CHRONIC BILATERAL LOW BACK PAIN WITHOUT SCIATICA: ICD-10-CM

## 2021-10-04 RX ORDER — CYCLOBENZAPRINE HCL 5 MG
TABLET ORAL
Qty: 90 TABLET | Refills: 2 | Status: SHIPPED | OUTPATIENT
Start: 2021-10-04 | End: 2021-12-15 | Stop reason: SDUPTHER

## 2021-10-20 ENCOUNTER — TELEPHONE (OUTPATIENT)
Dept: FAMILY MEDICINE CLINIC | Facility: CLINIC | Age: 56
End: 2021-10-20

## 2021-10-20 ENCOUNTER — HOSPITAL ENCOUNTER (EMERGENCY)
Facility: HOSPITAL | Age: 56
Discharge: HOME | End: 2021-10-20
Attending: EMERGENCY MEDICINE | Admitting: EMERGENCY MEDICINE

## 2021-10-20 VITALS
HEIGHT: 70 IN | TEMPERATURE: 97.7 F | DIASTOLIC BLOOD PRESSURE: 70 MMHG | SYSTOLIC BLOOD PRESSURE: 95 MMHG | HEART RATE: 92 BPM | BODY MASS INDEX: 21.11 KG/M2 | RESPIRATION RATE: 18 BRPM | OXYGEN SATURATION: 100 % | WEIGHT: 147.49 LBS

## 2021-10-20 DIAGNOSIS — K92.2 GASTROINTESTINAL HEMORRHAGE, UNSPECIFIED GASTROINTESTINAL HEMORRHAGE TYPE: Primary | ICD-10-CM

## 2021-10-20 LAB
ABO GROUP BLD: NORMAL
ALBUMIN SERPL-MCNC: 4.8 G/DL (ref 3.5–5.2)
ALBUMIN/GLOB SERPL: 2.3 G/DL
ALP SERPL-CCNC: 79 U/L (ref 39–117)
ALT SERPL W P-5'-P-CCNC: 13 U/L (ref 1–41)
ANION GAP SERPL CALCULATED.3IONS-SCNC: 11 MMOL/L (ref 5–15)
APTT PPP: 28.6 SECONDS (ref 20–40.3)
AST SERPL-CCNC: 17 U/L (ref 1–40)
BASOPHILS # BLD AUTO: 0.05 10*3/MM3 (ref 0–0.2)
BASOPHILS NFR BLD AUTO: 0.5 % (ref 0–1.5)
BILIRUB SERPL-MCNC: 0.7 MG/DL (ref 0–1.2)
BILIRUB UR QL STRIP: ABNORMAL
BLD GP AB SCN SERPL QL: NEGATIVE
BUN SERPL-MCNC: 16 MG/DL (ref 6–20)
BUN/CREAT SERPL: 17.8 (ref 7–25)
CALCIUM SPEC-SCNC: 9.4 MG/DL (ref 8.6–10.5)
CHLORIDE SERPL-SCNC: 102 MMOL/L (ref 98–107)
CLARITY UR: CLEAR
CO2 SERPL-SCNC: 26 MMOL/L (ref 22–29)
COLOR UR: ABNORMAL
CREAT SERPL-MCNC: 0.9 MG/DL (ref 0.76–1.27)
DEPRECATED RDW RBC AUTO: 41.1 FL (ref 37–54)
EOSINOPHIL # BLD AUTO: 0.17 10*3/MM3 (ref 0–0.4)
EOSINOPHIL NFR BLD AUTO: 1.7 % (ref 0.3–6.2)
ERYTHROCYTE [DISTWIDTH] IN BLOOD BY AUTOMATED COUNT: 13.1 % (ref 12.3–15.4)
GFR SERPL CREATININE-BSD FRML MDRD: 88 ML/MIN/1.73
GLOBULIN UR ELPH-MCNC: 2.1 GM/DL
GLUCOSE SERPL-MCNC: 125 MG/DL (ref 65–99)
GLUCOSE UR STRIP-MCNC: NEGATIVE MG/DL
HCT VFR BLD AUTO: 43.9 % (ref 37.5–51)
HGB BLD-MCNC: 15 G/DL (ref 13–17.7)
HGB UR QL STRIP.AUTO: NEGATIVE
HOLD SPECIMEN: NORMAL
HOLD SPECIMEN: NORMAL
IMM GRANULOCYTES # BLD AUTO: 0.03 10*3/MM3 (ref 0–0.05)
IMM GRANULOCYTES NFR BLD AUTO: 0.3 % (ref 0–0.5)
INR PPP: 0.96 (ref 0.8–1.2)
KETONES UR QL STRIP: ABNORMAL
LEUKOCYTE ESTERASE UR QL STRIP.AUTO: NEGATIVE
LYMPHOCYTES # BLD AUTO: 2.54 10*3/MM3 (ref 0.7–3.1)
LYMPHOCYTES NFR BLD AUTO: 25.7 % (ref 19.6–45.3)
Lab: NORMAL
MCH RBC QN AUTO: 29.8 PG (ref 26.6–33)
MCHC RBC AUTO-ENTMCNC: 34.2 G/DL (ref 31.5–35.7)
MCV RBC AUTO: 87.1 FL (ref 79–97)
MONOCYTES # BLD AUTO: 0.65 10*3/MM3 (ref 0.1–0.9)
MONOCYTES NFR BLD AUTO: 6.6 % (ref 5–12)
NEUTROPHILS NFR BLD AUTO: 6.46 10*3/MM3 (ref 1.7–7)
NEUTROPHILS NFR BLD AUTO: 65.2 % (ref 42.7–76)
NITRITE UR QL STRIP: NEGATIVE
NRBC BLD AUTO-RTO: 0 /100 WBC (ref 0–0.2)
PH UR STRIP.AUTO: 5.5 [PH] (ref 5–9)
PLATELET # BLD AUTO: 289 10*3/MM3 (ref 140–450)
PMV BLD AUTO: 8.8 FL (ref 6–12)
POTASSIUM SERPL-SCNC: 3.8 MMOL/L (ref 3.5–5.2)
PROT SERPL-MCNC: 6.9 G/DL (ref 6–8.5)
PROT UR QL STRIP: NEGATIVE
PROTHROMBIN TIME: 12.7 SECONDS (ref 11.1–15.3)
RBC # BLD AUTO: 5.04 10*6/MM3 (ref 4.14–5.8)
RH BLD: POSITIVE
SODIUM SERPL-SCNC: 139 MMOL/L (ref 136–145)
SP GR UR STRIP: 1.03 (ref 1–1.03)
T&S EXPIRATION DATE: NORMAL
UROBILINOGEN UR QL STRIP: ABNORMAL
WBC # BLD AUTO: 9.9 10*3/MM3 (ref 3.4–10.8)
WHOLE BLOOD HOLD SPECIMEN: NORMAL
WHOLE BLOOD HOLD SPECIMEN: NORMAL

## 2021-10-20 PROCEDURE — 99283 EMERGENCY DEPT VISIT LOW MDM: CPT

## 2021-10-20 PROCEDURE — 85025 COMPLETE CBC W/AUTO DIFF WBC: CPT | Performed by: FAMILY MEDICINE

## 2021-10-20 PROCEDURE — 80053 COMPREHEN METABOLIC PANEL: CPT | Performed by: FAMILY MEDICINE

## 2021-10-20 PROCEDURE — 86850 RBC ANTIBODY SCREEN: CPT | Performed by: FAMILY MEDICINE

## 2021-10-20 PROCEDURE — 86900 BLOOD TYPING SEROLOGIC ABO: CPT | Performed by: FAMILY MEDICINE

## 2021-10-20 PROCEDURE — 85730 THROMBOPLASTIN TIME PARTIAL: CPT | Performed by: PHYSICIAN ASSISTANT

## 2021-10-20 PROCEDURE — 86901 BLOOD TYPING SEROLOGIC RH(D): CPT | Performed by: FAMILY MEDICINE

## 2021-10-20 PROCEDURE — 85610 PROTHROMBIN TIME: CPT | Performed by: PHYSICIAN ASSISTANT

## 2021-10-20 PROCEDURE — 81003 URINALYSIS AUTO W/O SCOPE: CPT | Performed by: PHYSICIAN ASSISTANT

## 2021-10-20 RX ORDER — SODIUM CHLORIDE 0.9 % (FLUSH) 0.9 %
10 SYRINGE (ML) INJECTION AS NEEDED
Status: DISCONTINUED | OUTPATIENT
Start: 2021-10-20 | End: 2021-10-20 | Stop reason: HOSPADM

## 2021-10-20 NOTE — ED NOTES
Patient presents to ED with c/o bright red bloody stools that occurred last night and around noon today. States he is unsure if his urine is just dark or if he may have blood in urine also. Denies any pain, states he was light headed last night but reports none today.      Leticia Morrison, TREMAINE  10/20/21 2074

## 2021-10-20 NOTE — ED TRIAGE NOTES
Pt reports blood in his stool last pm several times. Last pm he had abdominal cramping but none throughout the day.

## 2021-10-20 NOTE — TELEPHONE ENCOUNTER
Pt called and left a message that he has been peeing and pooping blood and wanted Danisha to call him to see what he needed to do. Phone is [phone]

## 2021-10-21 NOTE — ED PROVIDER NOTES
Subjective   Patient presents to emergency department for blood in stool and in urine since yesterday.   He denies any other symptoms.  He is not on any anticoagulation.  States last night he had some abdominal cramping but none today.        History provided by:  Patient   used: No        Review of Systems   Constitutional: Negative for chills and fever.   HENT: Negative for sore throat and trouble swallowing.    Eyes: Negative for visual disturbance.   Respiratory: Negative for cough and shortness of breath.    Cardiovascular: Negative for chest pain.   Gastrointestinal: Positive for blood in stool. Negative for abdominal pain, nausea and vomiting.   Genitourinary: Positive for hematuria. Negative for dysuria and flank pain.   Allergic/Immunologic: Negative for immunocompromised state.   Neurological: Negative for syncope and weakness.   Hematological: Does not bruise/bleed easily.   Psychiatric/Behavioral: Negative for confusion.       Past Medical History:   Diagnosis Date   • Aortic aneurysm (CMS/HCC)    • Candidiasis of skin and nails 02/03/2016   • Chronic pain disorder    • Coronary artery disease    • Depression    • Dyslipidemia 07/28/2015   • Joint pain 02/03/2016    RIGHT SHOULDER   • Low back pain 05/10/2016   • Muscle weakness 12/07/2015   • Pure hypercholesterolemia 07/28/2015   • Tobacco dependence syndrome 06/09/2015       No Known Allergies    Past Surgical History:   Procedure Laterality Date   • CARDIAC CATHETERIZATION N/A 10/14/2019    Procedure: Left Heart Cath/ PCI  if indicated;  Surgeon: Tobi Gary MD;  Location: [facility] INVASIVE LOCATION;  Service: Cardiovascular       Family History   Problem Relation Age of Onset   • Diabetes Mother    • Migraines Sister    • Diabetes Maternal Grandmother        Social History     Socioeconomic History   • Marital status:    Tobacco Use   • Smoking status: Current Every Day Smoker     Packs/day: 1.00      "Types: Cigarettes   • Smokeless tobacco: Never Used   Substance and Sexual Activity   • Alcohol use: Yes   • Drug use: Yes     Types: Marijuana   • Sexual activity: Defer           Objective      BP 95/70 (Patient Position: Standing)   Pulse 92   Temp 97.7 °F (36.5 °C) (Oral)   Resp 18   Ht 177.8 cm (70\")   Wt 66.9 kg (147 lb 7.8 oz)   SpO2 100%   BMI 21.16 kg/m²     Physical Exam  Vitals and nursing note reviewed.   Constitutional:       Appearance: Normal appearance.   HENT:      Head: Normocephalic and atraumatic.   Eyes:      Conjunctiva/sclera: Conjunctivae normal.   Cardiovascular:      Rate and Rhythm: Normal rate and regular rhythm.      Pulses: Normal pulses.      Heart sounds: Normal heart sounds.   Pulmonary:      Effort: Pulmonary effort is normal. No respiratory distress.      Breath sounds: Normal breath sounds. No wheezing.   Abdominal:      General: Abdomen is flat. Bowel sounds are normal. There is no distension.      Tenderness: There is no abdominal tenderness.   Genitourinary:     Rectum: Normal. Guaiac result positive (microscopic).   Skin:     General: Skin is warm.      Capillary Refill: Capillary refill takes less than 2 seconds.   Neurological:      Mental Status: He is alert. Mental status is at baseline.   Psychiatric:         Mood and Affect: Mood normal.         Behavior: Behavior normal.         Thought Content: Thought content normal.         Procedures           ED Course      Results for orders placed or performed during the hospital encounter of 10/20/21   Comprehensive Metabolic Panel    Specimen: Blood   Result Value Ref Range    Glucose 125 (H) 65 - 99 mg/dL    BUN 16 6 - 20 mg/dL    Creatinine 0.90 0.76 - 1.27 mg/dL    Sodium 139 136 - 145 mmol/L    Potassium 3.8 3.5 - 5.2 mmol/L    Chloride 102 98 - 107 mmol/L    CO2 26.0 22.0 - 29.0 mmol/L    Calcium 9.4 8.6 - 10.5 mg/dL    Total Protein 6.9 6.0 - 8.5 g/dL    Albumin 4.80 3.50 - 5.20 g/dL    ALT (SGPT) 13 1 - 41 U/L    " AST (SGOT) 17 1 - 40 U/L    Alkaline Phosphatase 79 39 - 117 U/L    Total Bilirubin 0.7 0.0 - 1.2 mg/dL    eGFR Non African Amer 88 >60 mL/min/1.73    Globulin 2.1 gm/dL    A/G Ratio 2.3 g/dL    BUN/Creatinine Ratio 17.8 7.0 - 25.0    Anion Gap 11.0 5.0 - 15.0 mmol/L   CBC Auto Differential    Specimen: Blood   Result Value Ref Range    WBC 9.90 3.40 - 10.80 10*3/mm3    RBC 5.04 4.14 - 5.80 10*6/mm3    Hemoglobin 15.0 13.0 - 17.7 g/dL    Hematocrit 43.9 37.5 - 51.0 %    MCV 87.1 79.0 - 97.0 fL    MCH 29.8 26.6 - 33.0 pg    MCHC 34.2 31.5 - 35.7 g/dL    RDW 13.1 12.3 - 15.4 %    RDW-SD 41.1 37.0 - 54.0 fl    MPV 8.8 6.0 - 12.0 fL    Platelets 289 140 - 450 10*3/mm3    Neutrophil % 65.2 42.7 - 76.0 %    Lymphocyte % 25.7 19.6 - 45.3 %    Monocyte % 6.6 5.0 - 12.0 %    Eosinophil % 1.7 0.3 - 6.2 %    Basophil % 0.5 0.0 - 1.5 %    Immature Grans % 0.3 0.0 - 0.5 %    Neutrophils, Absolute 6.46 1.70 - 7.00 10*3/mm3    Lymphocytes, Absolute 2.54 0.70 - 3.10 10*3/mm3    Monocytes, Absolute 0.65 0.10 - 0.90 10*3/mm3    Eosinophils, Absolute 0.17 0.00 - 0.40 10*3/mm3    Basophils, Absolute 0.05 0.00 - 0.20 10*3/mm3    Immature Grans, Absolute 0.03 0.00 - 0.05 10*3/mm3    nRBC 0.0 0.0 - 0.2 /100 WBC   Urinalysis With Microscopic If Indicated (No Culture) - Urine, Clean Catch    Specimen: Urine, Clean Catch   Result Value Ref Range    Color, UA Dark Yellow Yellow, Straw, Dark Yellow, Erendira    Appearance, UA Clear Clear    pH, UA 5.5 5.0 - 9.0    Specific Gravity, UA 1.032 (H) 1.003 - 1.030    Glucose, UA Negative Negative    Ketones, UA Trace (A) Negative    Bilirubin, UA Small (1+) (A) Negative    Blood, UA Negative Negative    Protein, UA Negative Negative    Leuk Esterase, UA Negative Negative    Nitrite, UA Negative Negative    Urobilinogen, UA 1.0 E.U./dL 0.2 - 1.0 E.U./dL   Protime-INR    Specimen: Blood   Result Value Ref Range    Protime 12.7 11.1 - 15.3 Seconds    INR 0.96 0.80 - 1.20   aPTT    Specimen: Blood   Result  Value Ref Range    PTT 28.6 20.0 - 40.3 seconds   Type & Screen    Specimen: Blood   Result Value Ref Range    ABO Type O     RH type Positive     Antibody Screen Negative     T&S Expiration Date 10/23/2021 11:59:59 PM    Green Top (Gel)   Result Value Ref Range    Extra Tube Hold for add-ons.    Lavender Top   Result Value Ref Range    Extra Tube hold for add-on    Gold Top - SST   Result Value Ref Range    Extra Tube Hold for add-ons.    Light Blue Top   Result Value Ref Range    Extra Tube hold for add-on                                           MDM    Final diagnoses:   Gastrointestinal hemorrhage, unspecified gastrointestinal hemorrhage type       ED Disposition  ED Disposition     ED Disposition Condition Comment    Discharge Stable           [facility] GASTROENTEROLOGY  [facility] [address]  [address] [place] [address] [phone]  Call in 1 day      [facility] EMERGENCY DEPARTMENT  [address]  [facility] [address] [phone]  Go to   if symptoms worsen.         Medication List      No changes were made to your prescriptions during this visit.          Danie Espino PA-C  10/20/21 2000

## 2021-10-21 NOTE — ED NOTES
Pt verbalized understanding of discharged instructions and follow-up care. Pt denies any questions at [facility]. Pt ambulatory at [place]. S. NAD noted.     Michael Martin RN  10/20/21 2007

## 2021-12-02 DIAGNOSIS — I25.10 CORONARY ARTERY DISEASE INVOLVING NATIVE CORONARY ARTERY OF NATIVE HEART WITHOUT ANGINA PECTORIS: ICD-10-CM

## 2021-12-14 ENCOUNTER — TELEPHONE (OUTPATIENT)
Dept: FAMILY MEDICINE CLINIC | Facility: CLINIC | Age: 56
End: 2021-12-14

## 2021-12-14 NOTE — TELEPHONE ENCOUNTER
Incoming Refill Request      Medication requested (name and dose): cyclobenzaprine (FLEXERIL) 5 MG tablet    Pharmacy where request should be sent: [facility]    Additional details provided by patient: none    Best call back number: [phone]    Does the patient have less than a 3 day supply:  [x] Yes  [] No    Kayce Kong  12/14/21, 14:47 CST

## 2021-12-15 DIAGNOSIS — G89.29 CHRONIC BILATERAL LOW BACK PAIN WITHOUT SCIATICA: ICD-10-CM

## 2021-12-15 DIAGNOSIS — M54.50 CHRONIC BILATERAL LOW BACK PAIN WITHOUT SCIATICA: ICD-10-CM

## 2021-12-15 RX ORDER — CYCLOBENZAPRINE HCL 5 MG
TABLET ORAL
Qty: 180 TABLET | Refills: 2 | Status: SHIPPED | OUTPATIENT
Start: 2021-12-15 | End: 2022-04-11 | Stop reason: SDUPTHER

## 2022-01-04 ENCOUNTER — OFFICE VISIT (OUTPATIENT)
Dept: CARDIOLOGY | Facility: CLINIC | Age: 57
End: 2022-01-04

## 2022-01-04 VITALS
HEIGHT: 70 IN | HEART RATE: 79 BPM | DIASTOLIC BLOOD PRESSURE: 70 MMHG | TEMPERATURE: 97.8 F | WEIGHT: 152.2 LBS | BODY MASS INDEX: 21.79 KG/M2 | SYSTOLIC BLOOD PRESSURE: 120 MMHG | OXYGEN SATURATION: 99 %

## 2022-01-04 DIAGNOSIS — F17.200 TOBACCO DEPENDENCE SYNDROME: ICD-10-CM

## 2022-01-04 DIAGNOSIS — I71.21 ASCENDING AORTIC ANEURYSM: ICD-10-CM

## 2022-01-04 DIAGNOSIS — I25.10 CORONARY ARTERY DISEASE INVOLVING NATIVE CORONARY ARTERY OF NATIVE HEART WITHOUT ANGINA PECTORIS: Primary | ICD-10-CM

## 2022-01-04 DIAGNOSIS — Q23.1 BICUSPID AORTIC VALVE: ICD-10-CM

## 2022-01-04 LAB
QT INTERVAL: 364 MS
QTC INTERVAL: 417 MS

## 2022-01-04 PROCEDURE — 93000 ELECTROCARDIOGRAM COMPLETE: CPT | Performed by: INTERNAL MEDICINE

## 2022-01-04 PROCEDURE — 99214 OFFICE O/P EST MOD 30 MIN: CPT | Performed by: INTERNAL MEDICINE

## 2022-01-04 NOTE — PROGRESS NOTES
Tre Nielsen  56 y.o. male    1. Coronary artery disease involving native coronary artery of native heart without angina pectoris    2. Ascending aortic aneurysm (HCC)    3. Bicuspid aortic valve    4. Tobacco dependence syndrome        History of Present Illness  Mr. Nielsen is a 56-year-old  male with multiple risk factors for coronary artery disease including tobacco use, hyperlipidemia, positive family history for CAD who presented in 10/ 2019 with a rather unusual combination of vague chest discomfort, nausea, back pain associated with jerky movements in the left upper and lower extremities. EKG showed no acute ST-T wave changes but cardiac enzymes were elevated suggesting a non-ST segment elevation myocardial infarction.  Catheterization was performed and interventions performed as described below   >90+% lesion noted in the left circumflex coronary artery and successful PCI with placement of a 2.5 x 18 mm Xience Breann stent and reduction stenosis to 0%.  80% lesion noted in the mid ramus intermedius coronary artery and successful PCI with placement of a 2.75 x 18 mm Xience Breann stent and reduction stenosis 0%  Left anterior descending coronary artery and right coronary artery were widely patent with minor luminal irregularities.  Left ventriculogram was not performed.  Ascending aorta was ectatic/aneurysmal and to further evaluation by CT scan of the chest showed:    CT FINDINGS: Heart size normal. No evidence of pathologically  enlarged nodes. Subtle fibrotic changes at both lung bases. Lungs  otherwise clear.   Dilated ascending aorta 4.71 cm series 2 image 54. Normal caliber  aortic arch 2.66 cm series 2 image 33. Normal descending aorta.  Normal caliber abdominal aorta at 1.8 cm series 2 image 123.      Transthoracic echocardiogram raised the question of bicuspid aortic valve and hence a LAURENCE was performed in December 2019  Impression: Bicuspid aortic valve with mild calcification with no  evidence of aortic stenosis.  There was trace aortic valve insufficiency.  Mitral valve and tricuspid valve appeared structurally normal.  Normal left ventricular systolic function with no wall motion abnormalities.  Aortic root/ascending aorta was mildly dilated.  Aortic root measured 4.3 cm.    Echocardiogram in January 2021 showed:  · Estimated left ventricular EF = 64% Left ventricular ejection fraction appears to be 61 - 65%. Left ventricular systolic function is normal.  · Left ventricular diastolic function is consistent with (grade I) impaired relaxation.  · The aortic valve is abnormal in structure. A bicuspid aortic valve. There is mild calcification of the aortic valve. Mild aortic valve regurgitation is present. No hemodynamically significant aortic valve stenosis is present.  · Estimated right ventricular systolic pressure from tricuspid regurgitation is normal (<35 mmHg).  · Moderate dilation of the proximal aorta is present (4.8-4.9 cms)    Subsequent CT angiogram of the chest showed:  Aneurysmal dilatation of the ascending thoracic aorta measuring  4.4 cm in diameter, unchanged compared to the prior study from  10/14/2019.     The patient has progressed well since I last saw him and he denied any chest pain, shortness of breath or palpitation.  He has been compliant with his medications.  His heart rate and blood pressure today were in the normal range.    EKG today showed sinus rhythm with heart rate of 79 bpm. Baseline artifacts. Poor R wave progression anteroseptal leads.    No Known Allergies      Past Medical History:   Diagnosis Date   • Aortic aneurysm (HCC)    • Candidiasis of skin and nails 02/03/2016   • Chronic pain disorder    • Coronary artery disease    • Depression    • Dyslipidemia 07/28/2015   • Joint pain 02/03/2016    RIGHT SHOULDER   • Low back pain 05/10/2016   • Muscle weakness 12/07/2015   • Pure hypercholesterolemia 07/28/2015   • Tobacco dependence syndrome 06/09/2015  "        Past Surgical History:   Procedure Laterality Date   • CARDIAC CATHETERIZATION N/A 10/14/2019    Procedure: Left Heart Cath/ PCI  if indicated;  Surgeon: Tobi Gary MD;  Location: [facility] CATH INVASIVE LOCATION;  Service: Cardiovascular         Family History   Problem Relation Age of Onset   • Diabetes Mother    • Migraines Sister    • Diabetes Maternal Grandmother          Social History     Socioeconomic History   • Marital status:    Tobacco Use   • Smoking status: Current Every Day Smoker     Packs/day: 1.00     Types: Cigarettes   • Smokeless tobacco: Never Used   Substance and Sexual Activity   • Alcohol use: Yes   • Drug use: Yes     Types: Marijuana   • Sexual activity: Defer         Current Outpatient Medications   Medication Sig Dispense Refill   • aspirin 81 MG EC tablet TAKE 1 TABLET BY MOUTH EVERY DAY 90 tablet 3   • cyclobenzaprine (FLEXERIL) 5 MG tablet TAKE 2 TABLETS BY MOUTH THREE TIMES DAILY AS NEEDED FOR MUSCLE SPASMS 180 tablet 2   • metoprolol tartrate (LOPRESSOR) 25 MG tablet TAKE 1/2 TABLET BY MOUTH EVERY 12 HOURS 90 tablet 3   • prasugrel (EFFIENT) 10 MG tablet TAKE 1 TABLET BY MOUTH EVERY DAY 90 tablet 3   • simvastatin (ZOCOR) 20 MG tablet TAKE 1 TABLET BY MOUTH EVERY NIGHT AT BEDTIME 90 tablet 3     No current facility-administered medications for this visit.         OBJECTIVE    /70 (BP Location: Left arm, Patient Position: Sitting, Cuff Size: Adult)   Pulse 79   Temp 97.8 °F (36.6 °C)   Ht 177.8 cm (70\")   Wt 69 kg (152 lb 3.2 oz)   SpO2 99%   BMI 21.84 kg/m²         Review of Systems : The following systems are reviewed and no changes noted    Constitutional:  Denies recent weight loss, weight gain, fever or chills, no change in exercise tolerance     HENT:  Denies any hearing loss, epistaxis, hoarseness, or difficulty speaking.     Eyes: Wears eyeglasses or contact lenses     Respiratory:  Denies dyspnea with exertion,no cough, wheezing, or " hemoptysis.     Cardiovascular: Negative for palpations, chest pain, orthopnea, PND, peripheral edema, syncope, or claudication.     Gastrointestinal:  Denies change in bowel habits, dyspepsia, ulcer disease, hematochezia, or melena.     Endocrine: Negative for cold intolerance, heat intolerance, polydipsia, polyphagia and polyuria.     Genitourinary: Negative.      Musculoskeletal: DJD    Neurological:  Denies any history of recurrent headaches, strokes, TIA, or seizure disorder.     Hematological: Denies any food allergies, seasonal allergies, bleeding disorders, or lymphadenopathy.     Psychiatric/Behavioral: Denies any history of depression, substance abuse, or change in cognitive function.       Physical Exam : The following systems were reassessed and no changes noted    Constitutional: Cooperative, alert and oriented,  in no acute distress.     HENT:   Head: Normocephalic, normal hair patterns, no masses or tenderness.  Ears, Nose, and Throat: No gross abnormalities. No pallor or cyanosis. Dentition good.   Eyes: EOMS intact, PERRL, conjunctivae and lids unremarkable. Fundoscopic exam and visual fields not performed.   Neck: No palpable masses or adenopathy, no thyromegaly, no JVD, carotid pulses are full and equal bilaterally and without  Bruits.     Cardiovascular: Regular rhythm, S1 and S2 normal, no S3 or S4.  No murmurs, gallops, or rubs detected.     Pulmonary/Chest: Chest: normal symmetry,  normal respiratory excursion, no intercostal retraction, no use of accessory muscles.            Pulmonary: Normal breath sounds. No rales or ronchi.    Abdominal: Abdomen soft, bowel sounds normoactive, no masses, no hepatosplenomegaly, non-tender, no bruits.     Musculoskeletal: No deformities, clubbing, cyanosis, erythema, or edema observed.     Neurological: No gross motor or sensory deficits noted, affect appropriate, oriented to time, person, place.     Skin: Warm and dry to the touch, no apparent skin lesions  or masses noted.     Psychiatric: He has a normal mood and affect. His behavior is normal. Judgment and thought content normal.         Procedures      Lab Results   Component Value Date    WBC 9.90 10/20/2021    HGB 15.0 10/20/2021    HCT 43.9 10/20/2021    MCV 87.1 10/20/2021     10/20/2021     Lab Results   Component Value Date    GLUCOSE 125 (H) 10/20/2021    BUN 16 10/20/2021    CREATININE 0.90 10/20/2021    EGFRIFNONA 88 10/20/2021    BCR 17.8 10/20/2021    CO2 26.0 10/20/2021    CALCIUM 9.4 10/20/2021    ALBUMIN 4.80 10/20/2021    AST 17 10/20/2021    ALT 13 10/20/2021     Lab Results   Component Value Date    CHOL 138 01/05/2021    CHOL 182 10/15/2019    CHOL 224 (H) 05/29/2019     Lab Results   Component Value Date    TRIG 82 01/05/2021    TRIG 104 10/15/2019    TRIG 109 05/29/2019     Lab Results   Component Value Date    HDL 45 01/05/2021    HDL 42 10/15/2019    HDL 43 05/29/2019     No components found for: LDLCALC  Lab Results   Component Value Date    LDL 77 01/05/2021     (H) 10/15/2019     (H) 05/29/2019     No results found for: HDLLDLRATIO  No components found for: CHOLHDL  Lab Results   Component Value Date    HGBA1C 5.40 10/15/2019     Lab Results   Component Value Date    TSH 1.960 01/14/2021           ASSESSMENT AND PLAN  Mr. Nielsen is progressing well clinically with no evidence of angina, arrhythmia or congestive heart failure.  He unfortunately continues to smoke and we had a discussion about smoking cessation.   I have continued antiplatelet therapy with aspirin and Effient, lipid-lowering therapy with simvastatin.     An echocardiogram to reassess his aortic root and aortic valve will be arranged.  He had questions with regards to his cardiac status and these were addressed.  He will have lab work done by his primary care physician later this month and I understand the lipid profiles will be checked.  His LDL was 77 when checked in January 2021.  His lab results from  October 2021 were reviewed.    Diagnoses and all orders for this visit:    1. Coronary artery disease involving native coronary artery of native heart without angina pectoris (Primary)  -     ECG 12 Lead  -     Adult Transthoracic Echo Complete w/ Color, Spectral and Contrast if Necessary Per Protocol; Future    2. Ascending aortic aneurysm (HCC)  -     Adult Transthoracic Echo Complete w/ Color, Spectral and Contrast if Necessary Per Protocol; Future    3. Bicuspid aortic valve  -     Adult Transthoracic Echo Complete w/ Color, Spectral and Contrast if Necessary Per Protocol; Future    4. Tobacco dependence syndrome  -     Adult Transthoracic Echo Complete w/ Color, Spectral and Contrast if Necessary Per Protocol; Future        Patient's Body mass index is 21.84 kg/m². BMI is within normal parameters. No follow-up required..      Tre Nielsen is a current cigarettes user.  He currently smokes 1 pack of cigarettes per day for a duration of 35 years. I have educated him on the risk of diseases from using tobacco products such as cancer, COPD and heart diease.       I spent 3  minutes counseling the patient.          Tobi Gary MD  1/4/2022  09:54 CST

## 2022-01-17 ENCOUNTER — TELEPHONE (OUTPATIENT)
Dept: CARDIOLOGY | Facility: CLINIC | Age: 57
End: 2022-01-17

## 2022-01-17 NOTE — TELEPHONE ENCOUNTER
Echocardiogram within acceptable limits.  Aorta is mildly dilated.  We will keep an eye on it  Per Dr Howard  Called pt notified and understood

## 2022-01-31 ENCOUNTER — OFFICE VISIT (OUTPATIENT)
Dept: FAMILY MEDICINE CLINIC | Facility: CLINIC | Age: 57
End: 2022-01-31

## 2022-01-31 ENCOUNTER — LAB (OUTPATIENT)
Dept: LAB | Facility: HOSPITAL | Age: 57
End: 2022-01-31

## 2022-01-31 VITALS
WEIGHT: 151.2 LBS | HEIGHT: 70 IN | SYSTOLIC BLOOD PRESSURE: 108 MMHG | BODY MASS INDEX: 21.64 KG/M2 | DIASTOLIC BLOOD PRESSURE: 82 MMHG

## 2022-01-31 DIAGNOSIS — Z00.00 ANNUAL PHYSICAL EXAM: Primary | ICD-10-CM

## 2022-01-31 DIAGNOSIS — E78.00 PURE HYPERCHOLESTEROLEMIA: ICD-10-CM

## 2022-01-31 DIAGNOSIS — F17.200 SMOKING: ICD-10-CM

## 2022-01-31 DIAGNOSIS — I25.10 CORONARY ARTERY DISEASE INVOLVING NATIVE CORONARY ARTERY OF NATIVE HEART WITHOUT ANGINA PECTORIS: ICD-10-CM

## 2022-01-31 DIAGNOSIS — G89.29 CHRONIC MIDLINE LOW BACK PAIN WITHOUT SCIATICA: ICD-10-CM

## 2022-01-31 DIAGNOSIS — M54.50 CHRONIC MIDLINE LOW BACK PAIN WITHOUT SCIATICA: ICD-10-CM

## 2022-01-31 DIAGNOSIS — Z13.29 SCREENING FOR HYPOTHYROIDISM: ICD-10-CM

## 2022-01-31 DIAGNOSIS — Z12.5 SCREENING FOR PROSTATE CANCER: ICD-10-CM

## 2022-01-31 LAB
ALBUMIN SERPL-MCNC: 4.3 G/DL (ref 3.5–5.2)
ALBUMIN/GLOB SERPL: 2 G/DL
ALP SERPL-CCNC: 84 U/L (ref 39–117)
ALT SERPL W P-5'-P-CCNC: 13 U/L (ref 1–41)
ANION GAP SERPL CALCULATED.3IONS-SCNC: 7.4 MMOL/L (ref 5–15)
AST SERPL-CCNC: 16 U/L (ref 1–40)
BASOPHILS # BLD AUTO: 0.05 10*3/MM3 (ref 0–0.2)
BASOPHILS NFR BLD AUTO: 0.9 % (ref 0–1.5)
BILIRUB SERPL-MCNC: 0.3 MG/DL (ref 0–1.2)
BUN SERPL-MCNC: 11 MG/DL (ref 6–20)
BUN/CREAT SERPL: 13.6 (ref 7–25)
CALCIUM SPEC-SCNC: 9.3 MG/DL (ref 8.6–10.5)
CHLORIDE SERPL-SCNC: 103 MMOL/L (ref 98–107)
CHOLEST SERPL-MCNC: 147 MG/DL (ref 0–200)
CO2 SERPL-SCNC: 28.6 MMOL/L (ref 22–29)
CREAT SERPL-MCNC: 0.81 MG/DL (ref 0.76–1.27)
DEPRECATED RDW RBC AUTO: 39.5 FL (ref 37–54)
EOSINOPHIL # BLD AUTO: 0.13 10*3/MM3 (ref 0–0.4)
EOSINOPHIL NFR BLD AUTO: 2.3 % (ref 0.3–6.2)
ERYTHROCYTE [DISTWIDTH] IN BLOOD BY AUTOMATED COUNT: 12.8 % (ref 12.3–15.4)
GFR SERPL CREATININE-BSD FRML MDRD: 99 ML/MIN/1.73
GLOBULIN UR ELPH-MCNC: 2.2 GM/DL
GLUCOSE SERPL-MCNC: 100 MG/DL (ref 65–99)
HCT VFR BLD AUTO: 44.6 % (ref 37.5–51)
HDLC SERPL-MCNC: 45 MG/DL (ref 40–60)
HGB BLD-MCNC: 14.9 G/DL (ref 13–17.7)
IMM GRANULOCYTES # BLD AUTO: 0.01 10*3/MM3 (ref 0–0.05)
IMM GRANULOCYTES NFR BLD AUTO: 0.2 % (ref 0–0.5)
LDLC SERPL CALC-MCNC: 89 MG/DL (ref 0–100)
LDLC/HDLC SERPL: 1.97 {RATIO}
LYMPHOCYTES # BLD AUTO: 2.03 10*3/MM3 (ref 0.7–3.1)
LYMPHOCYTES NFR BLD AUTO: 35.9 % (ref 19.6–45.3)
MCH RBC QN AUTO: 28.9 PG (ref 26.6–33)
MCHC RBC AUTO-ENTMCNC: 33.4 G/DL (ref 31.5–35.7)
MCV RBC AUTO: 86.4 FL (ref 79–97)
MONOCYTES # BLD AUTO: 0.45 10*3/MM3 (ref 0.1–0.9)
MONOCYTES NFR BLD AUTO: 8 % (ref 5–12)
NEUTROPHILS NFR BLD AUTO: 2.99 10*3/MM3 (ref 1.7–7)
NEUTROPHILS NFR BLD AUTO: 52.7 % (ref 42.7–76)
NRBC BLD AUTO-RTO: 0 /100 WBC (ref 0–0.2)
PLATELET # BLD AUTO: 313 10*3/MM3 (ref 140–450)
PMV BLD AUTO: 9.3 FL (ref 6–12)
POTASSIUM SERPL-SCNC: 4.6 MMOL/L (ref 3.5–5.2)
PROT SERPL-MCNC: 6.5 G/DL (ref 6–8.5)
PSA SERPL-MCNC: 2.27 NG/ML (ref 0–4)
RBC # BLD AUTO: 5.16 10*6/MM3 (ref 4.14–5.8)
SODIUM SERPL-SCNC: 139 MMOL/L (ref 136–145)
TRIGL SERPL-MCNC: 67 MG/DL (ref 0–150)
TSH SERPL DL<=0.05 MIU/L-ACNC: 1.33 UIU/ML (ref 0.27–4.2)
VLDLC SERPL-MCNC: 13 MG/DL (ref 5–40)
WBC NRBC COR # BLD: 5.66 10*3/MM3 (ref 3.4–10.8)

## 2022-01-31 PROCEDURE — 80061 LIPID PANEL: CPT

## 2022-01-31 PROCEDURE — 80050 GENERAL HEALTH PANEL: CPT

## 2022-01-31 PROCEDURE — G0103 PSA SCREENING: HCPCS

## 2022-01-31 PROCEDURE — 99396 PREV VISIT EST AGE 40-64: CPT | Performed by: NURSE PRACTITIONER

## 2022-01-31 NOTE — PROGRESS NOTES
Chief Complaint  Annual Exam    Subjective  Annual physical exam.        Tre Nielsen presents to [facility] [place]  Back Pain  This is a chronic problem. The current episode started more than 1 year ago. The problem occurs intermittently. The problem is unchanged. The pain is present in the lumbar spine. The pain is at a severity of 3/10. The pain is mild. The symptoms are aggravated by position. Pertinent negatives include no chest pain or leg pain. He has tried muscle relaxant (Surgical intervention, Ibupofen) for the symptoms. The treatment provided moderate relief.   Hyperlipidemia  This is a chronic problem. The current episode started more than 1 year ago. The problem is controlled. Factors aggravating his hyperlipidemia include fatty foods and smoking. Pertinent negatives include no chest pain, leg pain or shortness of breath. Current antihyperlipidemic treatment includes statins. The current treatment provides significant improvement of lipids. There are no compliance problems.  Risk factors for coronary artery disease include dyslipidemia, family history and male sex.   Coronary Artery Disease  Presents for follow-up visit. Pertinent negatives include no chest pain, chest pressure, chest tightness or shortness of breath. Risk factors include hyperlipidemia. The symptoms have been stable. Compliance with diet is good. Compliance with exercise is good. Compliance with medications is good.   Nicotine Dependence  Presents for follow-up visit. The symptoms have been stable. His first smoke is from 6 to 8 AM. He smokes 1 pack of cigarettes per day.     Current Outpatient Medications on File Prior to Visit   Medication Sig Dispense Refill   • aspirin 81 MG EC tablet TAKE 1 TABLET BY MOUTH EVERY DAY 90 tablet 3   • cyclobenzaprine (FLEXERIL) 5 MG tablet TAKE 2 TABLETS BY MOUTH THREE TIMES DAILY AS NEEDED FOR MUSCLE SPASMS 180 tablet 2   • metoprolol tartrate  "(LOPRESSOR) 25 MG tablet TAKE 1/2 TABLET BY MOUTH EVERY 12 HOURS 90 tablet 3   • prasugrel (EFFIENT) 10 MG tablet TAKE 1 TABLET BY MOUTH EVERY DAY 90 tablet 3   • simvastatin (ZOCOR) 20 MG tablet TAKE 1 TABLET BY MOUTH EVERY NIGHT AT BEDTIME 90 tablet 3     No current facility-administered medications on file prior to visit.     No Known Allergies    Objective   Vital Signs:   /82   Ht 177.8 cm (70\")   Wt 68.6 kg (151 lb 3.2 oz)   BMI 21.69 kg/m²     Physical Exam  Vitals and nursing note reviewed.   Constitutional:       Appearance: Normal appearance. He is well-developed and normal weight.   HENT:      Head: Normocephalic and atraumatic.      Right Ear: Tympanic membrane, ear canal and external ear normal.      Left Ear: Tympanic membrane, ear canal and external ear normal.      Mouth/Throat:      Mouth: Mucous membranes are moist.      Pharynx: Oropharynx is clear.   Eyes:      Extraocular Movements: Extraocular movements intact.      Conjunctiva/sclera: Conjunctivae normal.      Pupils: Pupils are equal, round, and reactive to light.   Cardiovascular:      Rate and Rhythm: Normal rate and regular rhythm.      Pulses: Normal pulses.      Heart sounds: Normal heart sounds.   Pulmonary:      Effort: Pulmonary effort is normal.      Breath sounds: Normal breath sounds.   Abdominal:      General: Bowel sounds are normal.      Palpations: Abdomen is soft.   Musculoskeletal:         General: Normal range of motion.      Cervical back: Normal range of motion and neck supple.   Skin:     General: Skin is warm.      Capillary Refill: Capillary refill takes less than 2 seconds.   Neurological:      Mental Status: He is alert and oriented to person, place, and time.   Psychiatric:         Behavior: Behavior normal.        Result Review :     Common labs    Common Labsle 10/20/21 10/20/21    1817 1817   Glucose  125 (A)   BUN  16   Creatinine  0.90   eGFR Non African Am  88   Sodium  139   Potassium  3.8   Chloride  " 102   Calcium  9.4   Albumin  4.80   Total Bilirubin  0.7   Alkaline Phosphatase  79   AST (SGOT)  17   ALT (SGPT)  13   WBC 9.90    Hemoglobin 15.0    Hematocrit 43.9    Platelets 289    (A) Abnormal value                      Assessment and Plan    Diagnoses and all orders for this visit:    1. Annual physical exam (Primary)    2. Coronary artery disease involving native coronary artery of native heart without angina pectoris  -     CBC & Differential; Future  -     Comprehensive Metabolic Panel; Future    3. Pure hypercholesterolemia  -     Lipid Panel; Future    4. Chronic midline low back pain without sciatica    5. Smoking    6. Screening for hypothyroidism  -     TSH; Future    7. Screening for prostate cancer  -     PSA Screen; Future    1.  Annual physical exam:  Continue on current medications as previously prescribed   Counseling on importance of heathy eating habits and regular physical activity regimen on improving overall physical and mental health.     2.  Coronary artery disease involving native coronary artery of native heart without angina pectoris:  Continue metoprolol tartrate as previously prescribed  Continue baby aspirin daily  Encourage smoking cessation    3.  Pure hypercholesterolemia:  Complete fasting lipid panel as ordered will notify of results when available  Continue on simvastatin as previously prescribed  Reduce saturated fat in diet by baking or air frying food    4.  Chronic midline low back pain without sciatica:  Symptoms improving  Continue Flexeril as previously prescribed    5.  Smoking:  Tre Engel Morales  reports that he has been smoking cigarettes. He has been smoking about 1.00 pack per day. He has never used smokeless tobacco.. I have educated him on the risk of diseases from using tobacco products such as cancer, COPD and heart disease.   I advised him to quit and he is not willing to quit.  I spent 3  minutes counseling the patient.    6.  Screening for  hypothyroidism:  Complete TSH as ordered and will notify results when available    7.  Screening for prostate cancer:  Complete PSA as ordered and will notify results when available     I spent 24 minutes caring for Tre on this date of service. This time includes time spent by me in the following activities:preparing for the visit, reviewing tests, obtaining and/or reviewing a separately obtained history, performing a medically appropriate examination and/or evaluation , counseling and educating the patient/family/caregiver, ordering medications, tests, or procedures and documenting information in the medical record  Follow Up   Return in about 1 year (around 1/31/2023) for Annual physical.  Patient was given instructions and counseling regarding his condition or for health maintenance advice. Please see specific information pulled into the AVS if appropriate.         This document has been electronically signed by LINDA Milan on January 31, 2022 09:40 CST

## 2022-02-01 ENCOUNTER — TELEPHONE (OUTPATIENT)
Dept: FAMILY MEDICINE CLINIC | Facility: CLINIC | Age: 57
End: 2022-02-01

## 2022-02-01 NOTE — PROGRESS NOTES
Per LINDA Raman, Mr. Nielsen has been called with recent lab results & recommendations.  Continue current medications and follow-up as planned or sooner if any problems.

## 2022-02-01 NOTE — TELEPHONE ENCOUNTER
Per LINDA Raman, Mr. Nielsen has been called with recent lab results & recommendations.  Continue current medications and follow-up as planned or sooner if any problems.       ----- Message from LINDA Milan sent at 2/1/2022  6:40 AM CST -----  Labs normal, please call or send card!

## 2022-04-04 RX ORDER — PRASUGREL 10 MG/1
TABLET, FILM COATED ORAL
Qty: 90 TABLET | Refills: 3 | Status: SHIPPED | OUTPATIENT
Start: 2022-04-04

## 2022-04-11 DIAGNOSIS — G89.29 CHRONIC BILATERAL LOW BACK PAIN WITHOUT SCIATICA: ICD-10-CM

## 2022-04-11 DIAGNOSIS — M54.50 CHRONIC BILATERAL LOW BACK PAIN WITHOUT SCIATICA: ICD-10-CM

## 2022-04-11 RX ORDER — CYCLOBENZAPRINE HCL 5 MG
TABLET ORAL
Qty: 180 TABLET | Refills: 2 | Status: SHIPPED | OUTPATIENT
Start: 2022-04-11 | End: 2022-08-02 | Stop reason: SDUPTHER

## 2022-04-11 NOTE — TELEPHONE ENCOUNTER
Incoming Refill Request      Medication requested (name and dose): CYCLOBENZAPRINE     Pharmacy where request should be sent: [facility]     Additional details provided by patient:     Best call back number:     Does the patient have less than a 3 day supply:  [] Yes  [] No    Jona Peck Rep  04/11/22, 15:14 CDT

## 2022-07-05 ENCOUNTER — OFFICE VISIT (OUTPATIENT)
Dept: CARDIOLOGY | Facility: CLINIC | Age: 57
End: 2022-07-05

## 2022-07-05 VITALS
HEART RATE: 74 BPM | BODY MASS INDEX: 21.33 KG/M2 | DIASTOLIC BLOOD PRESSURE: 74 MMHG | TEMPERATURE: 97.8 F | HEIGHT: 70 IN | SYSTOLIC BLOOD PRESSURE: 118 MMHG | OXYGEN SATURATION: 98 % | WEIGHT: 149 LBS

## 2022-07-05 DIAGNOSIS — Q23.1 BICUSPID AORTIC VALVE: ICD-10-CM

## 2022-07-05 DIAGNOSIS — I71.21 ASCENDING AORTIC ANEURYSM: ICD-10-CM

## 2022-07-05 DIAGNOSIS — I25.10 CORONARY ARTERY DISEASE INVOLVING NATIVE CORONARY ARTERY OF NATIVE HEART WITHOUT ANGINA PECTORIS: Primary | ICD-10-CM

## 2022-07-05 DIAGNOSIS — E78.2 MIXED HYPERLIPIDEMIA: ICD-10-CM

## 2022-07-05 DIAGNOSIS — F17.200 TOBACCO DEPENDENCE SYNDROME: ICD-10-CM

## 2022-07-05 PROCEDURE — 99213 OFFICE O/P EST LOW 20 MIN: CPT | Performed by: INTERNAL MEDICINE

## 2022-07-05 NOTE — PROGRESS NOTES
Tre Nielsen  56 y.o. male    1. Coronary artery disease involving native coronary artery of native heart without angina pectoris    2. Bicuspid aortic valve    3. Ascending aortic aneurysm (HCC)    4. Mixed hyperlipidemia    5. Tobacco dependence syndrome        History of Present Illness  Mr. Nielsen is a 56-year-old  male with multiple risk factors for coronary artery disease including tobacco use, hyperlipidemia, positive family history for CAD who presented in 10/ 2019 with a rather unusual combination of vague chest discomfort, nausea, back pain associated with jerky movements in the left upper and lower extremities. EKG showed no acute ST-T wave changes but cardiac enzymes were elevated suggesting a non-ST segment elevation myocardial infarction.  Catheterization was performed and interventions performed as described below   >90+% lesion noted in the left circumflex coronary artery and successful PCI with placement of a 2.5 x 18 mm Xience Breann stent and reduction stenosis to 0%.  80% lesion noted in the mid ramus intermedius coronary artery and successful PCI with placement of a 2.75 x 18 mm Xience Breann stent and reduction stenosis 0%  Left anterior descending coronary artery and right coronary artery were widely patent with minor luminal irregularities.  Left ventriculogram was not performed.  Ascending aorta was ectatic/aneurysmal and to further evaluation by CT scan of the chest showed:    CT FINDINGS: Heart size normal. No evidence of pathologically  enlarged nodes. Subtle fibrotic changes at both lung bases. Lungs  otherwise clear.   Dilated ascending aorta 4.71 cm series 2 image 54. Normal caliber  aortic arch 2.66 cm series 2 image 33. Normal descending aorta.  Normal caliber abdominal aorta at 1.8 cm series 2 image 123.      Transthoracic echocardiogram raised the question of bicuspid aortic valve and hence a LAURENCE was performed in December 2019  Impression: Bicuspid aortic valve with  mild calcification with no evidence of aortic stenosis.  There was trace aortic valve insufficiency.  Mitral valve and tricuspid valve appeared structurally normal.  Normal left ventricular systolic function with no wall motion abnormalities.  Aortic root/ascending aorta was mildly dilated.  Aortic root measured 4.3 cm.    CT angiogram in January 2021 showed:  Aneurysmal dilatation of the ascending thoracic aorta measuring  4.4 cm in diameter, unchanged compared to the prior study from  10/14/2019.     Echocardiogram in January 2022 showed:  · Estimated left ventricular EF = 64% Left ventricular ejection fraction appears to be 61 - 65%. Left ventricular systolic function is normal.  · Left ventricular diastolic function is consistent with (grade I) impaired relaxation.  · The right ventricular cavity is borderline dilated.  · A bicuspid aortic valve. There is calcification of the aortic valve. Mild aortic valve regurgitation is present. No hemodynamically significant aortic valve stenosis is present.  · Estimated right ventricular systolic pressure from tricuspid regurgitation is normal (<35 mmHg).  · Mild dilation of the proximal aorta is present (3.8 cms)     The patient was progressed well and denied any cardiac symptoms with no chest pain, shortness of breath or palpitation.  Is been compliant with all his medications but unfortunately continues to smoke and does not wish to quit.  He is aware of the risks.    No Known Allergies      Past Medical History:   Diagnosis Date   • Aortic aneurysm (HCC)    • Candidiasis of skin and nails 02/03/2016   • Chronic pain disorder    • Coronary artery disease    • Depression    • Dyslipidemia 07/28/2015   • Joint pain 02/03/2016    RIGHT SHOULDER   • Low back pain 05/10/2016   • Muscle weakness 12/07/2015   • Pure hypercholesterolemia 07/28/2015   • Tobacco dependence syndrome 06/09/2015         Past Surgical History:   Procedure Laterality Date   • CARDIAC CATHETERIZATION  "N/A 10/14/2019    Procedure: Left Heart Cath/ PCI  if indicated;  Surgeon: Tobi Gary MD;  Location: [facility] INVASIVE LOCATION;  Service: Cardiovascular         Family History   Problem Relation Age of Onset   • Diabetes Mother    • Migraines Sister    • Diabetes Maternal Grandmother          Social History     Socioeconomic History   • Marital status:    Tobacco Use   • Smoking status: Current Every Day Smoker     Packs/day: 1.00     Types: Cigarettes   • Smokeless tobacco: Never Used   Substance and Sexual Activity   • Alcohol use: Yes   • Drug use: Yes     Types: Marijuana   • Sexual activity: Defer         Current Outpatient Medications   Medication Sig Dispense Refill   • aspirin 81 MG EC tablet TAKE 1 TABLET BY MOUTH EVERY DAY 90 tablet 3   • cyclobenzaprine (FLEXERIL) 5 MG tablet TAKE 2 TABLETS BY MOUTH THREE TIMES DAILY AS NEEDED FOR MUSCLE SPASMS 180 tablet 2   • metoprolol tartrate (LOPRESSOR) 25 MG tablet TAKE 1/2 TABLET BY MOUTH EVERY 12 HOURS 90 tablet 3   • prasugrel (EFFIENT) 10 MG tablet TAKE 1 TABLET BY MOUTH EVERY DAY 90 tablet 3   • simvastatin (ZOCOR) 20 MG tablet TAKE 1 TABLET BY MOUTH EVERY NIGHT AT BEDTIME 90 tablet 3     No current facility-administered medications for this visit.         OBJECTIVE    /74 (BP Location: Left arm, Patient Position: Sitting, Cuff Size: Adult)   Pulse 74   Temp 97.8 °F (36.6 °C)   Ht 177.8 cm (70\")   Wt 67.6 kg (149 lb)   SpO2 98%   BMI 21.38 kg/m²         Review of Systems : The following systems are reviewed and no changes noted    Constitutional:  Denies recent weight loss, weight gain, fever or chills, no change in exercise tolerance     HENT:  Denies any hearing loss, epistaxis, hoarseness, or difficulty speaking.     Eyes: Wears eyeglasses or contact lenses     Respiratory:  Denies dyspnea with exertion,no cough, wheezing, or hemoptysis.     Cardiovascular: Negative for palpations, chest pain, orthopnea, PND, " peripheral edema, syncope, or claudication.     Gastrointestinal:  Denies change in bowel habits, dyspepsia, ulcer disease, hematochezia, or melena.     Endocrine: Negative for cold intolerance, heat intolerance, polydipsia, polyphagia and polyuria.     Genitourinary: Negative.      Musculoskeletal: DJD    Neurological:  Denies any history of recurrent headaches, strokes, TIA, or seizure disorder.     Hematological: Denies any food allergies, seasonal allergies, bleeding disorders, or lymphadenopathy.     Psychiatric/Behavioral: Denies any history of depression, substance abuse, or change in cognitive function.       Physical Exam : The following systems were reassessed and no changes noted    Constitutional: Cooperative, alert and oriented,  in no acute distress.     HENT:   Head: Normocephalic, normal hair patterns, no masses or tenderness.  Ears, Nose, and Throat: No gross abnormalities. No pallor or cyanosis. Dentition good.   Eyes: EOMS intact, PERRL, conjunctivae and lids unremarkable. Fundoscopic exam and visual fields not performed.   Neck: No palpable masses or adenopathy, no thyromegaly, no JVD, carotid pulses are full and equal bilaterally and without  Bruits.     Cardiovascular: Regular rhythm, S1 and S2 normal, no S3 or S4.  No murmurs, gallops, or rubs detected.     Pulmonary/Chest: Chest: normal symmetry,  normal respiratory excursion, no intercostal retraction, no use of accessory muscles.            Pulmonary: Normal breath sounds. No rales or ronchi.    Abdominal: Abdomen soft, bowel sounds normoactive, no masses, no hepatosplenomegaly, non-tender, no bruits.     Musculoskeletal: No deformities, clubbing, cyanosis, erythema, or edema observed.     Neurological: No gross motor or sensory deficits noted, affect appropriate, oriented to time, person, place.     Skin: Warm and dry to the touch, no apparent skin lesions or masses noted.     Psychiatric: He has a normal mood and affect. His behavior is  normal. Judgment and thought content normal.         Procedures      Lab Results   Component Value Date    WBC 5.66 01/31/2022    HGB 14.9 01/31/2022    HCT 44.6 01/31/2022    MCV 86.4 01/31/2022     01/31/2022     Lab Results   Component Value Date    GLUCOSE 100 (H) 01/31/2022    BUN 11 01/31/2022    CREATININE 0.81 01/31/2022    EGFRIFNONA 99 01/31/2022    BCR 13.6 01/31/2022    CO2 28.6 01/31/2022    CALCIUM 9.3 01/31/2022    ALBUMIN 4.30 01/31/2022    AST 16 01/31/2022    ALT 13 01/31/2022     Lab Results   Component Value Date    CHOL 147 01/31/2022    CHOL 138 01/05/2021    CHOL 182 10/15/2019     Lab Results   Component Value Date    TRIG 67 01/31/2022    TRIG 82 01/05/2021    TRIG 104 10/15/2019     Lab Results   Component Value Date    HDL 45 01/31/2022    HDL 45 01/05/2021    HDL 42 10/15/2019     No components found for: LDLCALC  Lab Results   Component Value Date    LDL 89 01/31/2022    LDL 77 01/05/2021     (H) 10/15/2019     No results found for: HDLLDLRATIO  No components found for: CHOLHDL  Lab Results   Component Value Date    HGBA1C 5.40 10/15/2019     Lab Results   Component Value Date    TSH 1.330 01/31/2022           ASSESSMENT AND PLAN  Mr. Nielsen is progressing well clinically with no evidence of angina, arrhythmia or congestive heart failure. I have continued antiplatelet therapy with aspirin and Effient, lipid-lowering therapy with simvastatin.     His lab results from January 2022 were reviewed and LDL 99 and HDL 45.  Cholesterol was 147.  CBC and CMP were within normal limits.    He unfortunately continues to smoke and does not wish to quit.  He is aware of risks.    Diagnoses and all orders for this visit:    1. Coronary artery disease involving native coronary artery of native heart without angina pectoris (Primary)    2. Bicuspid aortic valve    3. Ascending aortic aneurysm (HCC)    4. Mixed hyperlipidemia    5. Tobacco dependence syndrome        Patient's Body mass index  is 21.38 kg/m². BMI is within normal parameters. No follow-up required..      Tre Nielsen is a current cigarettes user.  He currently smokes 1 pack of cigarettes per day for a duration of 35 years. I have educated him on the risk of diseases from using tobacco products such as cancer, COPD and heart diease.       I spent 3  minutes counseling the patient.          Tobi Gary MD  7/5/2022  10:54 CDT

## 2022-07-29 DIAGNOSIS — E78.2 MIXED HYPERLIPIDEMIA: ICD-10-CM

## 2022-07-29 RX ORDER — SIMVASTATIN 20 MG
20 TABLET ORAL
Qty: 90 TABLET | Refills: 3 | Status: SHIPPED | OUTPATIENT
Start: 2022-07-29

## 2022-08-02 DIAGNOSIS — M54.50 CHRONIC BILATERAL LOW BACK PAIN WITHOUT SCIATICA: ICD-10-CM

## 2022-08-02 DIAGNOSIS — G89.29 CHRONIC BILATERAL LOW BACK PAIN WITHOUT SCIATICA: ICD-10-CM

## 2022-08-02 RX ORDER — CYCLOBENZAPRINE HCL 5 MG
TABLET ORAL
Qty: 180 TABLET | Refills: 2 | Status: SHIPPED | OUTPATIENT
Start: 2022-08-02 | End: 2022-12-02 | Stop reason: SDUPTHER

## 2022-08-02 NOTE — TELEPHONE ENCOUNTER
Incoming Refill Request      Medication requested (name and dose):   cyclobenzaprine (FLEXERIL) 5 MG tablet    Pharmacy where request should be sent:   [facility]    Additional details provided by patient:   NONE    Best call back number: [phone]    Does the patient have less than a 3 day supply:  [x] Yes  [] No    Jona Gilliland Rep  08/02/22, 14:49 CDT

## 2022-08-05 ENCOUNTER — OFFICE VISIT (OUTPATIENT)
Dept: ORTHOPEDIC SURGERY | Facility: CLINIC | Age: 57
End: 2022-08-05

## 2022-08-05 VITALS — WEIGHT: 142.3 LBS | BODY MASS INDEX: 20.37 KG/M2 | HEIGHT: 70 IN

## 2022-08-05 DIAGNOSIS — M79.641 RIGHT HAND PAIN: Primary | ICD-10-CM

## 2022-08-05 DIAGNOSIS — M79.641 RIGHT HAND PAIN: ICD-10-CM

## 2022-08-05 DIAGNOSIS — S62.231A CLOSED DISPLACED FRACTURE OF BASE OF FIRST METACARPAL BONE OF RIGHT HAND, UNSPECIFIED FRACTURE MORPHOLOGY, INITIAL ENCOUNTER: Primary | ICD-10-CM

## 2022-08-05 DIAGNOSIS — S69.91XA INJURY OF RIGHT HAND, INITIAL ENCOUNTER: ICD-10-CM

## 2022-08-05 PROCEDURE — 99214 OFFICE O/P EST MOD 30 MIN: CPT | Performed by: NURSE PRACTITIONER

## 2022-08-05 RX ORDER — IBUPROFEN 200 MG
200 TABLET ORAL EVERY 6 HOURS PRN
COMMUNITY

## 2022-08-05 NOTE — PROGRESS NOTES
Tre Nielsen is a 56 y.o. male   Primary provider:  Danisha Segundo APRN       Chief Complaint   Patient presents with   • Right Hand - Pain   • Establish Care       HISTORY OF PRESENT ILLNESS: This 56-year-old male patient presents today with complaints of right hand pain after injury.  Date of injury 7/30/2022.  Patient reports he was walking, tripped landing on his hand.  The patient reports he heard a pop.  The patient followed up with urgent care on 7/30/2022. X-rays were performed and the patient was placed in a Velcro wrist.  Patient reports his pain is mild, constant with occasional aching.  Other associated symptoms include swelling.  The patient has tried NSAIDs, Velcro wrist splint, rest and ice.  Denies numbness and tingling.  Denies fever and chills.  Sent for repeat x-ray upon arrival.      Pain  This is a new problem. The current episode started in the past 7 days.        CONCURRENT MEDICAL HISTORY:    Past Medical History:   Diagnosis Date   • Aortic aneurysm (HCC)    • Candidiasis of skin and nails 02/03/2016   • Chronic pain disorder    • Coronary artery disease    • Depression    • Dyslipidemia 07/28/2015   • Joint pain 02/03/2016    RIGHT SHOULDER   • Low back pain 05/10/2016   • Muscle weakness 12/07/2015   • Pure hypercholesterolemia 07/28/2015   • Tobacco dependence syndrome 06/09/2015       No Known Allergies      Current Outpatient Medications:   •  cyclobenzaprine (FLEXERIL) 5 MG tablet, TAKE 2 TABLETS BY MOUTH THREE TIMES DAILY AS NEEDED FOR MUSCLE SPASMS, Disp: 180 tablet, Rfl: 2  •  ibuprofen (ADVIL,MOTRIN) 200 MG tablet, Take 200 mg by mouth Every 6 (Six) Hours As Needed for Mild Pain ., Disp: , Rfl:   •  metoprolol tartrate (LOPRESSOR) 25 MG tablet, TAKE 1/2 TABLET BY MOUTH EVERY 12 HOURS, Disp: 90 tablet, Rfl: 3  •  prasugrel (EFFIENT) 10 MG tablet, TAKE 1 TABLET BY MOUTH EVERY DAY, Disp: 90 tablet, Rfl: 3  •  simvastatin (ZOCOR) 20 MG tablet, Take 1 tablet by mouth every night  "at bedtime., Disp: 90 tablet, Rfl: 3  •  aspirin 81 MG EC tablet, TAKE 1 TABLET BY MOUTH EVERY DAY, Disp: 90 tablet, Rfl: 3    Past Surgical History:   Procedure Laterality Date   • CARDIAC CATHETERIZATION N/A 10/14/2019    Procedure: Left Heart Cath/ PCI  if indicated;  Surgeon: Tobi Gary MD;  Location: [facility] INVASIVE LOCATION;  Service: Cardiovascular       Family History   Problem Relation Age of Onset   • Diabetes Mother    • Migraines Sister    • Diabetes Maternal Grandmother    • Cancer Other    • Hypertension Other         Social History     Socioeconomic History   • Marital status:    Tobacco Use   • Smoking status: Current Every Day Smoker     Packs/day: 1.00     Years: 38.00     Pack years: 38.00     Types: Cigarettes   • Smokeless tobacco: Never Used   Substance and Sexual Activity   • Alcohol use: Yes     Alcohol/week: 8.0 standard drinks     Types: 8 Cans of beer per week   • Drug use: Yes     Types: Marijuana   • Sexual activity: Defer        Review of Systems   Constitutional: Negative.    HENT: Negative.    Eyes: Negative.    Respiratory: Negative.    Cardiovascular: Negative.    Gastrointestinal: Negative.    Endocrine: Negative.    Genitourinary: Negative.    Musculoskeletal: Negative.    Skin: Negative.    Allergic/Immunologic: Negative.    Neurological: Negative.    Hematological: Negative.    Psychiatric/Behavioral: Negative.        PHYSICAL EXAMINATION:       Ht 177.8 cm (70\")   Wt 64.5 kg (142 lb 4.8 oz)   BMI 20.42 kg/m²     Physical Exam    GAIT:     [x]  Normal  []  Antalgic    Assistive device: [x]  None  []  Walker     []  Crutches  []  Cane     []  Wheelchair  []  Stretcher    Right Hand Exam     Tenderness   Right hand tenderness location: Mild tenderness near the CMC joint.    Comments:  Good range of motion and strength noted.  Patient able to make a fist and okay sign.  Skin warm, dry and intact.  Mild swelling.  Limited extension due to pain with arc " of motion.  Neurovascular intact.              XR Hand 3+ View Right    Result Date: 8/7/2022  Narrative: Three view right hand HISTORY: Pain. Fracture follow-up. AP, lateral and oblique views obtained. COMPARISON: July 30, 2022 FINDINGS: No change comminuted impacted fracture base of the metacarpal of the thumb. No dislocation. No other osseous or articular abnormality.     Impression: CONCLUSION: No change comminuted impacted fracture base of the metacarpal of the thumb. [id number] Electronically signed by:  Shiva Smith MD  8/7/2022 7:45 PM CDT Workstation: [id number]    XR Hand 3+ View Right    Result Date: 7/30/2022  Narrative: PROCEDURE: XR HAND 3+ VW RIGHT VIEWS: 3 INDICATION: Pain after fall COMPARISON: None FINDINGS:   - fracture: Comminuted fracture base of the first metacarpal   - alignment: Ulnar displacement of the distal fracture fragment, and mild impaction at the fracture site   - misc: Soft tissue swelling overlies the fracture     Impression: Comminuted fracture of the base of the first metacarpal as above. Electronically signed by:  Shraddha Trejo MD  7/30/2022 9:46 AM CDT Workstation: [id number]          ASSESSMENT:    Diagnoses and all orders for this visit:    Closed displaced fracture of base of first metacarpal bone of right hand, unspecified fracture morphology, initial encounter    Right hand pain    Injury of right hand, initial encounter    Other orders  -     ibuprofen (ADVIL,MOTRIN) 200 MG tablet; Take 200 mg by mouth Every 6 (Six) Hours As Needed for Mild Pain .          PLAN  Sent for x-ray upon arrival, reviewed and discussed x-ray with patient.  No changes noted to the comminuted fracture at the base of the first metacarpal with mild impaction.  Recommended patient continue to wear the Velcro wrist splint.  Rest, ice and elevate to reduce pain, swelling and inflammation.  Continue ibuprofen as previously taken.  Modify activity avoid heavy lifting and painful activity.  Follow-up in 1 week  for repeat x-ray.  May return sooner as needed if symptoms change or worsen.    All questions and concerns are addressed with understanding noted. They are aware and are in agreement to this plan.    Return in about 1 week (around 8/12/2022) for Recheck, repeat right hand x-ray..    LINDA Bishop    This document has been electronically signed by LINDA Bishop on August 9, 2022 11:26 CDT      EMR Dragon/Transciption Disclaimer: Some of this note may be an electronic transcription/translation of spoken language to printed text using the Dragon Dictation System.     Time spent of a minimum of 30 minutes including the face to face evaluation, reviewing of medical history and prior medial records, reviewing of diagnostic studies, documentation, patient education and coordination of care.

## 2022-08-08 RX ORDER — ASPIRIN 81 MG/1
TABLET ORAL
Qty: 90 TABLET | Refills: 3 | Status: SHIPPED | OUTPATIENT
Start: 2022-08-08

## 2022-08-10 DIAGNOSIS — M79.641 RIGHT HAND PAIN: Primary | ICD-10-CM

## 2022-08-12 ENCOUNTER — OFFICE VISIT (OUTPATIENT)
Dept: ORTHOPEDIC SURGERY | Facility: CLINIC | Age: 57
End: 2022-08-12

## 2022-08-12 VITALS — WEIGHT: 142 LBS | BODY MASS INDEX: 20.33 KG/M2 | HEIGHT: 70 IN

## 2022-08-12 DIAGNOSIS — S62.231D CLOSED DISPLACED FRACTURE OF BASE OF FIRST METACARPAL BONE OF RIGHT HAND WITH ROUTINE HEALING, UNSPECIFIED FRACTURE MORPHOLOGY, SUBSEQUENT ENCOUNTER: Primary | ICD-10-CM

## 2022-08-12 DIAGNOSIS — S69.91XD INJURY OF RIGHT HAND, SUBSEQUENT ENCOUNTER: ICD-10-CM

## 2022-08-12 DIAGNOSIS — M79.641 RIGHT HAND PAIN: ICD-10-CM

## 2022-08-12 PROCEDURE — 99213 OFFICE O/P EST LOW 20 MIN: CPT | Performed by: NURSE PRACTITIONER

## 2022-08-12 NOTE — PROGRESS NOTES
"Tre Nielsen is a 56 y.o. male returns for     Chief Complaint   Patient presents with   • Right Knee - Follow-up       HISTORY OF PRESENT ILLNESS: This 56-year-old male patient presents today for a repeat x-ray of the right hand.  This patient injured his right hand on 7/30/2022.  The patient is currently wearing a Velcro thumb spica splint for protection.  The patient reports his pain and swelling have improved significantly since last week.  Denies numbness and tingling.  Denies fever or chills.  Sent for x-ray upon arrival.       CONCURRENT MEDICAL HISTORY:    The following portions of the patient's history were reviewed and updated as appropriate: allergies, current medications, past family history, past medical history, past social history, past surgical history and problem list.     ROS  No fevers or chills.  No chest pain or shortness of air.  No GI or  disturbances.    PHYSICAL EXAMINATION:       Ht 177.8 cm (70\")   Wt 64.4 kg (142 lb)   BMI 20.37 kg/m²     Physical Exam    GAIT:     []  Normal  []  Antalgic    Assistive device: [x]  None  []  Walker     []  Crutches  []  Cane     []  Wheelchair  []  Stretcher    Ortho Exam      XR Hand 3+ View Right    Result Date: 8/7/2022  Narrative: Three view right hand HISTORY: Pain. Fracture follow-up. AP, lateral and oblique views obtained. COMPARISON: July 30, 2022 FINDINGS: No change comminuted impacted fracture base of the metacarpal of the thumb. No dislocation. No other osseous or articular abnormality.     Impression: CONCLUSION: No change comminuted impacted fracture base of the metacarpal of the thumb. [id number] Electronically signed by:  Shiva Smith MD  8/7/2022 7:45 PM CDT Workstation: [id number]    XR Hand 3+ View Right    Result Date: 7/30/2022  Narrative: PROCEDURE: XR HAND 3+ VW RIGHT VIEWS: 3 INDICATION: Pain after fall COMPARISON: None FINDINGS:   - fracture: Comminuted fracture base of the first metacarpal   - alignment: Ulnar displacement of " the distal fracture fragment, and mild impaction at the fracture site   - misc: Soft tissue swelling overlies the fracture     Impression: Comminuted fracture of the base of the first metacarpal as above. Electronically signed by:  Shraddha Trejo MD  7/30/2022 9:46 AM CDT Workstation: [id number]            ASSESSMENT:    Diagnoses and all orders for this visit:    Closed displaced fracture of base of first metacarpal bone of right hand with routine healing, unspecified fracture morphology, subsequent encounter    Right hand pain    Injury of right hand, subsequent encounter          PLAN  Repeat x-ray of right hand performed this visit, reviewed and discussed with patient.  Comminuted fracture at the base of the first metacarpal still present.  Displacement and impaction similar to previous x-ray performed on 7/30/2022.  Advised patient to continue wearing the thumb spica wrist splint and remove only for hygiene and gentle range of motion of his wrist and digits 2 through 5, otherwise he should be wearing at all times.  No lifting with the right hand.  Rest, elevate and ice as needed for pain and swelling.  Modify activity based on pain.  Follow-up in 3 weeks for repeat x-ray.  May follow-up sooner as needed if symptoms change or worsen.    All questions and concerns are addressed with understanding noted. They are aware and are in agreement to this plan.    Return in about 3 weeks (around 9/2/2022) for Repeat right hand x-ray.    LINDA Bishop     This document has been electronically signed by LINDA Bishop on August 12, 2022 15:16 CDT      EMR Dragon/Transciption Disclaimer: Some of this note may be an electronic transcription/translation of spoken language to printed text using the Dragon Dictation System.

## 2022-12-02 DIAGNOSIS — G89.29 CHRONIC BILATERAL LOW BACK PAIN WITHOUT SCIATICA: ICD-10-CM

## 2022-12-02 DIAGNOSIS — M54.50 CHRONIC BILATERAL LOW BACK PAIN WITHOUT SCIATICA: ICD-10-CM

## 2022-12-02 RX ORDER — CYCLOBENZAPRINE HCL 5 MG
TABLET ORAL
Qty: 180 TABLET | Refills: 2 | Status: SHIPPED | OUTPATIENT
Start: 2022-12-02 | End: 2023-04-03 | Stop reason: SDUPTHER

## 2022-12-02 NOTE — TELEPHONE ENCOUNTER
Incoming Refill Request        Medication requested (name and dose):   cyclobenzaprine (FLEXERIL) 5 MG tablet    Pharmacy where request should be sent:   [facility]    Additional details provided by patient:   None    Best call back number:   [phone]    Does the patient have less than a 3 day supply:  [x] Yes  [] No    Jona Gilliland Rep  12/02/22, 14:51 CST

## 2022-12-22 DIAGNOSIS — I25.10 CORONARY ARTERY DISEASE INVOLVING NATIVE CORONARY ARTERY OF NATIVE HEART WITHOUT ANGINA PECTORIS: ICD-10-CM

## 2023-01-09 ENCOUNTER — OFFICE VISIT (OUTPATIENT)
Dept: CARDIOLOGY | Facility: CLINIC | Age: 58
End: 2023-01-09
Payer: COMMERCIAL

## 2023-01-09 VITALS
SYSTOLIC BLOOD PRESSURE: 116 MMHG | HEART RATE: 74 BPM | BODY MASS INDEX: 20.47 KG/M2 | WEIGHT: 143 LBS | OXYGEN SATURATION: 99 % | DIASTOLIC BLOOD PRESSURE: 74 MMHG | HEIGHT: 70 IN

## 2023-01-09 DIAGNOSIS — E78.2 MIXED HYPERLIPIDEMIA: ICD-10-CM

## 2023-01-09 DIAGNOSIS — I71.21 ANEURYSM OF ASCENDING AORTA WITHOUT RUPTURE: ICD-10-CM

## 2023-01-09 DIAGNOSIS — Q23.1 BICUSPID AORTIC VALVE: ICD-10-CM

## 2023-01-09 DIAGNOSIS — I25.10 CORONARY ARTERY DISEASE INVOLVING NATIVE CORONARY ARTERY OF NATIVE HEART WITHOUT ANGINA PECTORIS: Primary | ICD-10-CM

## 2023-01-09 LAB
QT INTERVAL: 368 MS
QTC INTERVAL: 408 MS

## 2023-01-09 PROCEDURE — 99214 OFFICE O/P EST MOD 30 MIN: CPT | Performed by: INTERNAL MEDICINE

## 2023-01-09 PROCEDURE — 93000 ELECTROCARDIOGRAM COMPLETE: CPT | Performed by: INTERNAL MEDICINE

## 2023-01-09 NOTE — PROGRESS NOTES
Tre Nielsen  57 y.o. male    1. Coronary artery disease involving native coronary artery of native heart without angina pectoris    2. Bicuspid aortic valve    3. Aneurysm of ascending aorta without rupture    4. Mixed hyperlipidemia        History of Present Illness  Mr. Nielsen is a 57-year-old  male with multiple risk factors for coronary artery disease including tobacco use, hyperlipidemia, positive family history for CAD who presented in 10/ 2019 with a rather unusual combination of vague chest discomfort, nausea, back pain associated with jerky movements in the left upper and lower extremities. EKG showed no acute ST-T wave changes but cardiac enzymes were elevated suggesting a non-ST segment elevation myocardial infarction.  Catheterization was performed and interventions performed as described below   >90+% lesion noted in the left circumflex coronary artery and successful PCI with placement of a 2.5 x 18 mm Xience Breann stent and reduction stenosis to 0%.  80% lesion noted in the mid ramus intermedius coronary artery and successful PCI with placement of a 2.75 x 18 mm Xience Breann stent and reduction stenosis 0%  Left anterior descending coronary artery and right coronary artery were widely patent with minor luminal irregularities.  Left ventriculogram was not performed.  Ascending aorta was ectatic/aneurysmal and to further evaluation by CT scan of the chest showed:    CT FINDINGS: Heart size normal. No evidence of pathologically  enlarged nodes. Subtle fibrotic changes at both lung bases. Lungs  otherwise clear.   Dilated ascending aorta 4.71 cm series 2 image 54. Normal caliber  aortic arch 2.66 cm series 2 image 33. Normal descending aorta.  Normal caliber abdominal aorta at 1.8 cm series 2 image 123.      Transthoracic echocardiogram raised the question of bicuspid aortic valve and hence a LAURENCE was performed in December 2019  Impression: Bicuspid aortic valve with mild calcification with  no evidence of aortic stenosis.  There was trace aortic valve insufficiency.  Mitral valve and tricuspid valve appeared structurally normal.  Normal left ventricular systolic function with no wall motion abnormalities.  Aortic root/ascending aorta was mildly dilated.  Aortic root measured 4.3 cm.    CT angiogram in January 2021 showed:  Aneurysmal dilatation of the ascending thoracic aorta measuring  4.4 cm in diameter, unchanged compared to the prior study from  10/14/2019.     Echocardiogram in January 2022 showed:  · Estimated left ventricular EF = 64% Left ventricular ejection fraction appears to be 61 - 65%. Left ventricular systolic function is normal.  · Left ventricular diastolic function is consistent with (grade I) impaired relaxation.  · The right ventricular cavity is borderline dilated.  · A bicuspid aortic valve. There is calcification of the aortic valve. Mild aortic valve regurgitation is present. No hemodynamically significant aortic valve stenosis is present.  · Estimated right ventricular systolic pressure from tricuspid regurgitation is normal (<35 mmHg).  · Mild dilation of the proximal aorta is present (3.8 cms)     Patient denied any cardiac symptoms at this time and no chest pain, shortness of breath or palpitation was reported.  He is able to perform his activities of daily living.  Unfortunately continues to smoke and does not wish to quit.    EKG today showed sinus rhythm with heart rate of 74 bpm.  No ST-T wave changes diagnostic of ischemia.  QTc interval 408 ms.    No Known Allergies      Past Medical History:   Diagnosis Date   • Aortic aneurysm (HCC)    • Candidiasis of skin and nails 02/03/2016   • Chronic pain disorder    • Coronary artery disease    • Depression    • Dyslipidemia 07/28/2015   • Joint pain 02/03/2016    RIGHT SHOULDER   • Low back pain 05/10/2016   • Muscle weakness 12/07/2015   • Pure hypercholesterolemia 07/28/2015   • Tobacco dependence syndrome 06/09/2015          Past Surgical History:   Procedure Laterality Date   • CARDIAC CATHETERIZATION N/A 10/14/2019    Procedure: Left Heart Cath/ PCI  if indicated;  Surgeon: Tobi Gary MD;  Location: [facility] INVASIVE LOCATION;  Service: Cardiovascular         Family History   Problem Relation Age of Onset   • Diabetes Mother    • Migraines Sister    • Diabetes Maternal Grandmother    • Cancer Other    • Hypertension Other          Social History     Socioeconomic History   • Marital status:    Tobacco Use   • Smoking status: Every Day     Packs/day: 1.00     Years: 38.00     Pack years: 38.00     Types: Cigarettes   • Smokeless tobacco: Never   Substance and Sexual Activity   • Alcohol use: Yes     Alcohol/week: 8.0 standard drinks     Types: 8 Cans of beer per week   • Drug use: Yes     Types: Marijuana   • Sexual activity: Defer         Current Outpatient Medications   Medication Sig Dispense Refill   • aspirin 81 MG EC tablet TAKE 1 TABLET BY MOUTH EVERY DAY 90 tablet 3   • cyclobenzaprine (FLEXERIL) 5 MG tablet TAKE 2 TABLETS BY MOUTH THREE TIMES DAILY AS NEEDED FOR MUSCLE SPASMS 180 tablet 2   • ibuprofen (ADVIL,MOTRIN) 200 MG tablet Take 200 mg by mouth Every 6 (Six) Hours As Needed for Mild Pain .     • metoprolol tartrate (LOPRESSOR) 25 MG tablet TAKE 1/2 TABLET BY MOUTH EVERY 12 HOURS 90 tablet 3   • prasugrel (EFFIENT) 10 MG tablet TAKE 1 TABLET BY MOUTH EVERY DAY 90 tablet 3   • simvastatin (ZOCOR) 20 MG tablet Take 1 tablet by mouth every night at bedtime. 90 tablet 3     No current facility-administered medications for this visit.         OBJECTIVE    /74   Pulse 74   Ht 177.8 cm (70\")   Wt 64.9 kg (143 lb)   SpO2 99%   BMI 20.52 kg/m²         Review of Systems : The following systems are reviewed and no changes noted    Constitutional:  Denies recent weight loss, weight gain, fever or chills, no change in exercise tolerance     HENT:  Denies any hearing loss, epistaxis,  hoarseness, or difficulty speaking.     Eyes: Wears eyeglasses or contact lenses     Respiratory:  Denies dyspnea with exertion,no cough, wheezing, or hemoptysis.     Cardiovascular: Negative for palpations, chest pain, orthopnea, PND, peripheral edema, syncope, or claudication.     Gastrointestinal:  Denies change in bowel habits, dyspepsia, ulcer disease, hematochezia, or melena.     Endocrine: Negative for cold intolerance, heat intolerance, polydipsia, polyphagia and polyuria.     Genitourinary: Negative.      Musculoskeletal: DJD    Neurological:  Denies any history of recurrent headaches, strokes, TIA, or seizure disorder.     Hematological: Denies any food allergies, seasonal allergies, bleeding disorders, or lymphadenopathy.     Psychiatric/Behavioral: Denies any history of depression, substance abuse, or change in cognitive function.       Physical Exam : The following systems were reassessed and no changes noted    Constitutional: Cooperative, alert and oriented,  in no acute distress.     HENT:   Head: Normocephalic, normal hair patterns, no masses or tenderness.  Ears, Nose, and Throat: No gross abnormalities. No pallor or cyanosis. Dentition good.   Eyes: EOMS intact, PERRL, conjunctivae and lids unremarkable. Fundoscopic exam and visual fields not performed.   Neck: No palpable masses or adenopathy, no thyromegaly, no JVD, carotid pulses are full and equal bilaterally and without  Bruits.     Cardiovascular: Regular rhythm, S1 and S2 normal, no S3 or S4.  No murmurs, gallops, or rubs detected.     Pulmonary/Chest: Chest: normal symmetry,  normal respiratory excursion, no intercostal retraction, no use of accessory muscles.            Pulmonary: Normal breath sounds. No rales or ronchi.    Abdominal: Abdomen soft, bowel sounds normoactive, no masses, no hepatosplenomegaly, non-tender, no bruits.     Musculoskeletal: No deformities, clubbing, cyanosis, erythema, or edema observed.     Neurological: No  gross motor or sensory deficits noted, affect appropriate, oriented to time, person, place.     Skin: Warm and dry to the touch, no apparent skin lesions or masses noted.     Psychiatric: He has a normal mood and affect. His behavior is normal. Judgment and thought content normal.         Procedures      Lab Results   Component Value Date    WBC 5.66 01/31/2022    HGB 14.9 01/31/2022    HCT 44.6 01/31/2022    MCV 86.4 01/31/2022     01/31/2022     Lab Results   Component Value Date    GLUCOSE 100 (H) 01/31/2022    BUN 11 01/31/2022    CREATININE 0.81 01/31/2022    EGFRIFNONA 99 01/31/2022    BCR 13.6 01/31/2022    CO2 28.6 01/31/2022    CALCIUM 9.3 01/31/2022    ALBUMIN 4.30 01/31/2022    AST 16 01/31/2022    ALT 13 01/31/2022     Lab Results   Component Value Date    CHOL 147 01/31/2022    CHOL 138 01/05/2021    CHOL 182 10/15/2019     Lab Results   Component Value Date    TRIG 67 01/31/2022    TRIG 82 01/05/2021    TRIG 104 10/15/2019     Lab Results   Component Value Date    HDL 45 01/31/2022    HDL 45 01/05/2021    HDL 42 10/15/2019     No components found for: LDLCALC  Lab Results   Component Value Date    LDL 89 01/31/2022    LDL 77 01/05/2021     (H) 10/15/2019     No results found for: HDLLDLRATIO  No components found for: CHOLHDL  Lab Results   Component Value Date    HGBA1C 5.40 10/15/2019     Lab Results   Component Value Date    TSH 1.330 01/31/2022           ASSESSMENT AND PLAN  Mr. Nielsen is progressing well clinically with no evidence of angina, arrhythmia or congestive heart failure.  I have continued antiplatelet therapy with aspirin and Effient, lipid-lowering therapy with simvastatin.  To reassess his ascending aortic aneurysm a CT angiogram of the chest with contrast is being arranged.  Lab tests indicated below have been ordered.    He unfortunately continues to smoke and does not wish to quit.  He is aware of risks.    Diagnoses and all orders for this visit:    1. Coronary artery  disease involving native coronary artery of native heart without angina pectoris (Primary)  -     ECG 12 Lead  -     CT Angiogram Chest; Future  -     Lipid Panel; Future  -     Comprehensive Metabolic Panel; Future  -     CBC & Differential; Future    2. Bicuspid aortic valve  -     ECG 12 Lead  -     CT Angiogram Chest; Future  -     Lipid Panel; Future  -     Comprehensive Metabolic Panel; Future  -     CBC & Differential; Future    3. Aneurysm of ascending aorta without rupture  -     ECG 12 Lead  -     CT Angiogram Chest; Future  -     Lipid Panel; Future  -     Comprehensive Metabolic Panel; Future  -     CBC & Differential; Future    4. Mixed hyperlipidemia  -     ECG 12 Lead  -     CT Angiogram Chest; Future  -     Lipid Panel; Future  -     Comprehensive Metabolic Panel; Future  -     CBC & Differential; Future        Patient's Body mass index is 20.52 kg/m². BMI is within normal parameters. No follow-up required..      Tre Nielsen is a current cigarettes user.  He currently smokes 1 pack of cigarettes per day for a duration of 35 years. I have educated him on the risk of diseases from using tobacco products such as cancer, COPD and heart diease.       I spent 3  minutes counseling the patient.          Tobi Gary MD  1/9/2023  10:57 CST

## 2023-01-10 ENCOUNTER — LAB (OUTPATIENT)
Dept: LAB | Facility: HOSPITAL | Age: 58
End: 2023-01-10
Payer: COMMERCIAL

## 2023-01-10 DIAGNOSIS — I25.10 CORONARY ARTERY DISEASE INVOLVING NATIVE CORONARY ARTERY OF NATIVE HEART WITHOUT ANGINA PECTORIS: ICD-10-CM

## 2023-01-10 DIAGNOSIS — I71.21 ANEURYSM OF ASCENDING AORTA WITHOUT RUPTURE: ICD-10-CM

## 2023-01-10 DIAGNOSIS — E78.2 MIXED HYPERLIPIDEMIA: ICD-10-CM

## 2023-01-10 DIAGNOSIS — Q23.1 BICUSPID AORTIC VALVE: ICD-10-CM

## 2023-01-10 LAB
ALBUMIN SERPL-MCNC: 4.6 G/DL (ref 3.5–5.2)
ALBUMIN/GLOB SERPL: 2.1 G/DL
ALP SERPL-CCNC: 73 U/L (ref 39–117)
ALT SERPL W P-5'-P-CCNC: 11 U/L (ref 1–41)
ANION GAP SERPL CALCULATED.3IONS-SCNC: 6 MMOL/L (ref 5–15)
AST SERPL-CCNC: 18 U/L (ref 1–40)
BASOPHILS # BLD AUTO: 0.06 10*3/MM3 (ref 0–0.2)
BASOPHILS NFR BLD AUTO: 0.7 % (ref 0–1.5)
BILIRUB SERPL-MCNC: 0.5 MG/DL (ref 0–1.2)
BUN SERPL-MCNC: 14 MG/DL (ref 6–20)
BUN/CREAT SERPL: 16.5 (ref 7–25)
CALCIUM SPEC-SCNC: 9.4 MG/DL (ref 8.6–10.5)
CHLORIDE SERPL-SCNC: 105 MMOL/L (ref 98–107)
CHOLEST SERPL-MCNC: 166 MG/DL (ref 0–200)
CO2 SERPL-SCNC: 29 MMOL/L (ref 22–29)
CREAT SERPL-MCNC: 0.85 MG/DL (ref 0.76–1.27)
DEPRECATED RDW RBC AUTO: 39.8 FL (ref 37–54)
EGFRCR SERPLBLD CKD-EPI 2021: 102 ML/MIN/1.73
EOSINOPHIL # BLD AUTO: 0.08 10*3/MM3 (ref 0–0.4)
EOSINOPHIL NFR BLD AUTO: 1 % (ref 0.3–6.2)
ERYTHROCYTE [DISTWIDTH] IN BLOOD BY AUTOMATED COUNT: 12.9 % (ref 12.3–15.4)
GLOBULIN UR ELPH-MCNC: 2.2 GM/DL
GLUCOSE SERPL-MCNC: 103 MG/DL (ref 65–99)
HCT VFR BLD AUTO: 42.9 % (ref 37.5–51)
HDLC SERPL-MCNC: 53 MG/DL (ref 40–60)
HGB BLD-MCNC: 14.8 G/DL (ref 13–17.7)
IMM GRANULOCYTES # BLD AUTO: 0.03 10*3/MM3 (ref 0–0.05)
IMM GRANULOCYTES NFR BLD AUTO: 0.4 % (ref 0–0.5)
LDLC SERPL CALC-MCNC: 92 MG/DL (ref 0–100)
LDLC/HDLC SERPL: 1.7 {RATIO}
LYMPHOCYTES # BLD AUTO: 1.93 10*3/MM3 (ref 0.7–3.1)
LYMPHOCYTES NFR BLD AUTO: 23.7 % (ref 19.6–45.3)
MCH RBC QN AUTO: 29.1 PG (ref 26.6–33)
MCHC RBC AUTO-ENTMCNC: 34.5 G/DL (ref 31.5–35.7)
MCV RBC AUTO: 84.3 FL (ref 79–97)
MONOCYTES # BLD AUTO: 0.61 10*3/MM3 (ref 0.1–0.9)
MONOCYTES NFR BLD AUTO: 7.5 % (ref 5–12)
NEUTROPHILS NFR BLD AUTO: 5.44 10*3/MM3 (ref 1.7–7)
NEUTROPHILS NFR BLD AUTO: 66.7 % (ref 42.7–76)
NRBC BLD AUTO-RTO: 0 /100 WBC (ref 0–0.2)
PLATELET # BLD AUTO: 308 10*3/MM3 (ref 140–450)
PMV BLD AUTO: 9.2 FL (ref 6–12)
POTASSIUM SERPL-SCNC: 4.3 MMOL/L (ref 3.5–5.2)
PROT SERPL-MCNC: 6.8 G/DL (ref 6–8.5)
RBC # BLD AUTO: 5.09 10*6/MM3 (ref 4.14–5.8)
SODIUM SERPL-SCNC: 140 MMOL/L (ref 136–145)
TRIGL SERPL-MCNC: 115 MG/DL (ref 0–150)
VLDLC SERPL-MCNC: 21 MG/DL (ref 5–40)
WBC NRBC COR # BLD: 8.15 10*3/MM3 (ref 3.4–10.8)

## 2023-01-10 PROCEDURE — 36415 COLL VENOUS BLD VENIPUNCTURE: CPT

## 2023-01-10 PROCEDURE — 80053 COMPREHEN METABOLIC PANEL: CPT

## 2023-01-10 PROCEDURE — 80061 LIPID PANEL: CPT

## 2023-01-10 PROCEDURE — 85025 COMPLETE CBC W/AUTO DIFF WBC: CPT

## 2023-01-11 ENCOUNTER — TELEPHONE (OUTPATIENT)
Dept: CARDIOLOGY | Facility: CLINIC | Age: 58
End: 2023-01-11
Payer: COMMERCIAL

## 2023-01-11 NOTE — TELEPHONE ENCOUNTER
Called pt no answer and unable to leave vm----- Message from Tobi Gary MD sent at 1/11/2023  7:31 AM CST -----  Labs within acceptable normal limits  ----- Message -----  From: Lab, Background User  Sent: 1/10/2023   8:04 PM CST  To: Tobi Gary MD

## 2023-01-23 ENCOUNTER — HOSPITAL ENCOUNTER (OUTPATIENT)
Dept: CT IMAGING | Facility: HOSPITAL | Age: 58
Discharge: HOME | End: 2023-01-23
Admitting: INTERNAL MEDICINE
Payer: COMMERCIAL

## 2023-01-23 DIAGNOSIS — E78.2 MIXED HYPERLIPIDEMIA: ICD-10-CM

## 2023-01-23 DIAGNOSIS — I71.21 ANEURYSM OF ASCENDING AORTA WITHOUT RUPTURE: ICD-10-CM

## 2023-01-23 DIAGNOSIS — I25.10 CORONARY ARTERY DISEASE INVOLVING NATIVE CORONARY ARTERY OF NATIVE HEART WITHOUT ANGINA PECTORIS: ICD-10-CM

## 2023-01-23 DIAGNOSIS — Q23.1 BICUSPID AORTIC VALVE: ICD-10-CM

## 2023-01-23 PROCEDURE — 0 IOPAMIDOL PER 1 ML: Performed by: INTERNAL MEDICINE

## 2023-01-23 PROCEDURE — 71275 CT ANGIOGRAPHY CHEST: CPT

## 2023-01-23 RX ADMIN — IOPAMIDOL 90 ML: 755 INJECTION, SOLUTION INTRAVENOUS at 09:42

## 2023-01-24 ENCOUNTER — TELEPHONE (OUTPATIENT)
Dept: CARDIOLOGY | Facility: CLINIC | Age: 58
End: 2023-01-24
Payer: COMMERCIAL

## 2023-01-24 NOTE — TELEPHONE ENCOUNTER
Called pt notified and understood   Per Dr Howard----- Message from Tobi Gary MD sent at 1/24/2023  7:44 AM CST -----  No significant change in aortic aneurysm.  Measures 4.4 cm.  Continue to monitor  ----- Message -----  From: Interface, Rad Results [place] In  Sent: 1/23/2023  11:07 AM CST  To: Tobi Gary MD

## 2023-02-01 ENCOUNTER — OFFICE VISIT (OUTPATIENT)
Dept: FAMILY MEDICINE CLINIC | Facility: CLINIC | Age: 58
End: 2023-02-01
Payer: COMMERCIAL

## 2023-02-01 VITALS
HEIGHT: 70 IN | HEART RATE: 71 BPM | SYSTOLIC BLOOD PRESSURE: 122 MMHG | WEIGHT: 148.4 LBS | BODY MASS INDEX: 21.24 KG/M2 | DIASTOLIC BLOOD PRESSURE: 76 MMHG | OXYGEN SATURATION: 98 %

## 2023-02-01 DIAGNOSIS — Z00.00 ANNUAL PHYSICAL EXAM: Primary | ICD-10-CM

## 2023-02-01 DIAGNOSIS — E78.2 MIXED HYPERLIPIDEMIA: ICD-10-CM

## 2023-02-01 DIAGNOSIS — Z12.11 SCREEN FOR COLON CANCER: ICD-10-CM

## 2023-02-01 DIAGNOSIS — F17.200 CURRENT SMOKER: ICD-10-CM

## 2023-02-01 DIAGNOSIS — Z23 NEED FOR PNEUMOCOCCAL VACCINATION: ICD-10-CM

## 2023-02-01 DIAGNOSIS — I25.10 CORONARY ARTERY DISEASE INVOLVING NATIVE CORONARY ARTERY OF NATIVE HEART WITHOUT ANGINA PECTORIS: ICD-10-CM

## 2023-02-01 DIAGNOSIS — R91.1 PULMONARY NODULE: ICD-10-CM

## 2023-02-01 PROCEDURE — 99396 PREV VISIT EST AGE 40-64: CPT | Performed by: NURSE PRACTITIONER

## 2023-02-01 NOTE — PROGRESS NOTES
Chief Complaint  Annual Exam    Subjective  Annual physical exam.  Has coronary artery disease and high cholesterol.  Currently sees Dr. Gary for cardiology and had chest CT angiogram on January 23, 2023 which showed small pulmonary nodules.  He is a current smoker and has no desire to quit smoking at this time.      Tre Nielsen presents to [facility] [place]  Hyperlipidemia  This is a chronic problem. The current episode started more than 1 year ago. The problem is controlled. Factors aggravating his hyperlipidemia include fatty foods and smoking. Pertinent negatives include no shortness of breath. Current antihyperlipidemic treatment includes statins. The current treatment provides significant improvement of lipids. There are no compliance problems.  Risk factors for coronary artery disease include dyslipidemia, family history and male sex.   Coronary Artery Disease  Presents for follow-up visit. Pertinent negatives include no chest pressure, chest tightness or shortness of breath. Risk factors include hyperlipidemia. The symptoms have been stable. Compliance with diet is good. Compliance with exercise is good. Compliance with medications is good.   Nicotine Dependence  Presents for follow-up visit. The symptoms have been stable. His first smoke is from 6 to 8 AM. He smokes 1 pack of cigarettes per day.     Current Outpatient Medications on File Prior to Visit   Medication Sig Dispense Refill   • aspirin 81 MG EC tablet TAKE 1 TABLET BY MOUTH EVERY DAY 90 tablet 3   • cyclobenzaprine (FLEXERIL) 5 MG tablet TAKE 2 TABLETS BY MOUTH THREE TIMES DAILY AS NEEDED FOR MUSCLE SPASMS 180 tablet 2   • ibuprofen (ADVIL,MOTRIN) 200 MG tablet Take 200 mg by mouth Every 6 (Six) Hours As Needed for Mild Pain .     • metoprolol tartrate (LOPRESSOR) 25 MG tablet TAKE 1/2 TABLET BY MOUTH EVERY 12 HOURS 90 tablet 3   • prasugrel (EFFIENT) 10 MG tablet TAKE 1 TABLET BY MOUTH  "EVERY DAY 90 tablet 3   • simvastatin (ZOCOR) 20 MG tablet Take 1 tablet by mouth every night at bedtime. 90 tablet 3     No current facility-administered medications on file prior to visit.     No Known Allergies    Objective   Vital Signs:  /76   Pulse 71   Ht 177.8 cm (70\")   Wt 67.3 kg (148 lb 6.4 oz)   SpO2 98%   BMI 21.29 kg/m²   Estimated body mass index is 21.29 kg/m² as calculated from the following:    Height as of this encounter: 177.8 cm (70\").    Weight as of this encounter: 67.3 kg (148 lb 6.4 oz).       BMI is within normal parameters. No other follow-up for BMI required.      Physical Exam  Vitals and nursing note reviewed.   Constitutional:       Appearance: Normal appearance. He is well-developed and normal weight.   HENT:      Head: Normocephalic and atraumatic.      Right Ear: Tympanic membrane, ear canal and external ear normal.      Left Ear: Tympanic membrane, ear canal and external ear normal.      Nose: Nose normal.      Mouth/Throat:      Mouth: Mucous membranes are moist.      Pharynx: Oropharynx is clear.   Eyes:      Extraocular Movements: Extraocular movements intact.      Conjunctiva/sclera: Conjunctivae normal.      Pupils: Pupils are equal, round, and reactive to light.   Cardiovascular:      Rate and Rhythm: Normal rate and regular rhythm.      Pulses: Normal pulses.      Heart sounds: Normal heart sounds.   Pulmonary:      Effort: Pulmonary effort is normal.      Breath sounds: Normal breath sounds.   Abdominal:      General: Bowel sounds are normal.      Palpations: Abdomen is soft.   Musculoskeletal:         General: Normal range of motion.      Cervical back: Normal range of motion and neck supple.   Skin:     General: Skin is warm.      Capillary Refill: Capillary refill takes less than 2 seconds.   Neurological:      Mental Status: He is alert and oriented to person, place, and time.   Psychiatric:         Behavior: Behavior normal.        Result Review :  The " following data was reviewed by: LINDA Milan on 02/01/2023:  CMP    CMP 1/10/23   Glucose 103 (A)   BUN 14   Creatinine 0.85   eGFR 102.0   Sodium 140   Potassium 4.3   Chloride 105   Calcium 9.4   Total Protein 6.8   Albumin 4.6   Globulin 2.2   Total Bilirubin 0.5   Alkaline Phosphatase 73   AST (SGOT) 18   ALT (SGPT) 11   Albumin/Globulin Ratio 2.1   BUN/Creatinine Ratio 16.5   Anion Gap 6.0   (A) Abnormal value       Comments are available for some flowsheets but are not being displayed.           CBC    CBC 1/10/23   WBC 8.15   RBC 5.09   Hemoglobin 14.8   Hematocrit 42.9   MCV 84.3   MCH 29.1   MCHC 34.5   RDW 12.9   Platelets 308                        Assessment and Plan   Diagnoses and all orders for this visit:    1. Annual physical exam (Primary)    2. Coronary artery disease involving native coronary artery of native heart without angina pectoris    3. Mixed hyperlipidemia    4. Pulmonary nodule  -     Ambulatory Referral to Pulmonology; Future    5. Current smoker    6. Need for pneumococcal vaccination  -     Pneumococcal Conjugate Vaccine 20-Valent (PCV20); Future    7. Screen for colon cancer  -     Cologuard - Stool, Per Rectum; Future      1.  Annual physical exam:  Continue on current medications as previously prescribed   Counseling on importance of heathy eating habits and regular physical activity regimen on improving overall physical and mental health.     2.  Coronary artery disease involving native coronary artery of native heart without angina pectoris:  Continue daily baby aspirin as previously prescribed  Continue Effient as previously prescribed  Continue Lopressor as previously prescribed  Continue cardiology follow-up with Dr. Gary as scheduled on July 10, 2023  Seek emergency medical treatment for any new or worsening chest pain, chest tightness or palpitations    3.  Mixed hyperlipidemia:  Continue Zocor as previously prescribed  Adhere to low-fat diet  Try baking,  steaming or air frying foods to reduce saturated fats in diet    4.  Pulmonary nodule:  Referral placed to pulmonology will contact patient's schedule appointment    5.  Current smoker:  Tre Nielsen  reports that he has been smoking cigarettes. He has a 38.00 pack-year smoking history. He has never used smokeless tobacco.. I have educated him on the risk of diseases from using tobacco products such as cancer, COPD and heart disease.   I advised him to quit and he is not willing to quit.  I spent 3  minutes counseling the patient.    6.  Need for pneumococcal vaccination:  Pneumovax 20 given IM in office  Educated on possible side effects of vaccine therapy including but not limited to increased risk for pain, swelling and redness of injection site    7.  Screening for colon cancer:  Order for Cologuard placed and will notify patient of results when available       Follow Up   Return in about 1 year (around 2/1/2024) for Annual physical.  Patient was given instructions and counseling regarding his condition or for health maintenance advice. Please see specific information pulled into the AVS if appropriate.         This document has been electronically signed by LINDA Milan on February 1, 2023 09:54 CST

## 2023-02-03 DIAGNOSIS — F17.200 TOBACCO DEPENDENCE SYNDROME: Primary | ICD-10-CM

## 2023-03-01 ENCOUNTER — OFFICE VISIT (OUTPATIENT)
Dept: PULMONOLOGY | Facility: CLINIC | Age: 58
End: 2023-03-01
Payer: COMMERCIAL

## 2023-03-01 VITALS
SYSTOLIC BLOOD PRESSURE: 138 MMHG | HEIGHT: 70 IN | BODY MASS INDEX: 21.19 KG/M2 | WEIGHT: 148 LBS | DIASTOLIC BLOOD PRESSURE: 88 MMHG | HEART RATE: 86 BPM | OXYGEN SATURATION: 99 %

## 2023-03-01 DIAGNOSIS — F17.210 CIGARETTE NICOTINE DEPENDENCE WITHOUT COMPLICATION: ICD-10-CM

## 2023-03-01 DIAGNOSIS — J44.9 COPD, MILD: Primary | ICD-10-CM

## 2023-03-01 DIAGNOSIS — Z72.0 TOBACCO USE: ICD-10-CM

## 2023-03-01 PROCEDURE — 94060 EVALUATION OF WHEEZING: CPT | Performed by: INTERNAL MEDICINE

## 2023-03-01 PROCEDURE — 94727 GAS DIL/WSHOT DETER LNG VOL: CPT | Performed by: INTERNAL MEDICINE

## 2023-03-01 PROCEDURE — 99204 OFFICE O/P NEW MOD 45 MIN: CPT | Performed by: INTERNAL MEDICINE

## 2023-03-01 PROCEDURE — 94729 DIFFUSING CAPACITY: CPT | Performed by: INTERNAL MEDICINE

## 2023-03-01 NOTE — PROGRESS NOTES
FULL PFT WITH BRONCHODILATOR PERFORMED.     GOOD PATIENT EFFORT AND COOPERATION.     7+ SECOND EXHALATION ON SPIROMETRY, NO PLATEAU ACHIEVED, END OF TEST CRITERIA NOT MET.     ORDERED BY DR. KONG, READ BY DR. KONG

## 2023-03-01 NOTE — PROGRESS NOTES
Pulmonary Consultation    No ref. provider found,    Thank you for asking me to see Tre Nielsen for   Chief Complaint   Patient presents with   • Lung Nodule   .      History of Present Illness  Tre Nielsen is a 57 y.o. male     Patient referred from PCM for lung nodules    Patient is an active, life long smoker. He denies cough or shortness of breath. He had a brother with lung cancer so just wanted to get checked ou        Patient works driving a fork lift at [employer]  From [place]  No pets  No unusal expsoures    Tobacco use history:  Type: cigarettes  Amount: 1-1.5 ppd  Duration: 36 years  Cessation: no         Review of Systems: History obtained from chart review and the patient.  Review of Systems  As described in the HPI. Otherwise, remainder of ROS (14 systems) were negative.    Patient Active Problem List   Diagnosis   • Cigarette nicotine dependence without complication   • Pure hypercholesterolemia   • Low back pain   • Dyslipidemia   • Generalized anxiety disorder   • Chronic bilateral low back pain without sciatica   • Spondylosis of lumbar region without myelopathy or radiculopathy   • Sacroiliitis (HCC)   • Left lumbar radiculopathy   • Left hip pain   • Osteochondral defect   • Other spondylosis with radiculopathy, lumbar region   • Primary osteoarthritis of left hip   • Foraminal stenosis of lumbar region   • Degenerative spondylolisthesis   • Coronary artery disease involving native coronary artery of native heart without angina pectoris   • Mixed hyperlipidemia   • Ascending aortic aneurysm   • Bicuspid aortic valve   • COPD, mild (HCC)         Current Outpatient Medications:   •  aspirin 81 MG EC tablet, TAKE 1 TABLET BY MOUTH EVERY DAY, Disp: 90 tablet, Rfl: 3  •  cyclobenzaprine (FLEXERIL) 5 MG tablet, TAKE 2 TABLETS BY MOUTH THREE TIMES DAILY AS NEEDED FOR MUSCLE SPASMS, Disp: 180 tablet, Rfl: 2  •  ibuprofen (ADVIL,MOTRIN) 200 MG tablet, Take 1 tablet by mouth Every 6 (Six) Hours  "As Needed for Mild Pain., Disp: , Rfl:   •  metoprolol tartrate (LOPRESSOR) 25 MG tablet, TAKE 1/2 TABLET BY MOUTH EVERY 12 HOURS, Disp: 90 tablet, Rfl: 3  •  prasugrel (EFFIENT) 10 MG tablet, TAKE 1 TABLET BY MOUTH EVERY DAY, Disp: 90 tablet, Rfl: 3  •  simvastatin (ZOCOR) 20 MG tablet, Take 1 tablet by mouth every night at bedtime., Disp: 90 tablet, Rfl: 3    No Known Allergies    Past Medical History:   Diagnosis Date   • Aortic aneurysm (HCC)    • Candidiasis of skin and nails 02/03/2016   • Chronic pain disorder    • Coronary artery disease    • Depression    • Dyslipidemia 07/28/2015   • Joint pain 02/03/2016    RIGHT SHOULDER   • Low back pain 05/10/2016   • Muscle weakness 12/07/2015   • Pure hypercholesterolemia 07/28/2015   • Tobacco dependence syndrome 06/09/2015     Past Surgical History:   Procedure Laterality Date   • CARDIAC CATHETERIZATION N/A 10/14/2019    Procedure: Left Heart Cath/ PCI  if indicated;  Surgeon: Tobi Gary MD;  Location: [facility] INVASIVE LOCATION;  Service: Cardiovascular     Social History     Socioeconomic History   • Marital status:    Tobacco Use   • Smoking status: Every Day     Packs/day: 1.00     Years: 38.00     Pack years: 38.00     Types: Cigarettes   • Smokeless tobacco: Never   Vaping Use   • Vaping Use: Never used   Substance and Sexual Activity   • Alcohol use: Yes     Alcohol/week: 8.0 standard drinks     Types: 8 Cans of beer per week   • Drug use: Yes     Types: Marijuana   • Sexual activity: Defer     Family History   Problem Relation Age of Onset   • Diabetes Mother    • Migraines Sister    • Diabetes Maternal Grandmother    • Cancer Other    • Hypertension Other           Objective     Blood pressure 138/88, pulse 86, height 177.8 cm (70\"), weight 67.1 kg (148 lb), SpO2 99 %.  Physical Exam  Vitals reviewed.   Constitutional:       Appearance: Normal appearance.   HENT:      Head: Normocephalic and atraumatic.      Right Ear: Tympanic " membrane normal.      Left Ear: Tympanic membrane normal.      Nose: Nose normal.      Mouth/Throat:      Mouth: Mucous membranes are moist.      Pharynx: Oropharynx is clear.   Eyes:      Conjunctiva/sclera: Conjunctivae normal.      Pupils: Pupils are equal, round, and reactive to light.   Cardiovascular:      Rate and Rhythm: Normal rate and regular rhythm.      Pulses: Normal pulses.      Heart sounds: Normal heart sounds.   Pulmonary:      Effort: Pulmonary effort is normal.      Breath sounds: Normal breath sounds.   Abdominal:      General: Abdomen is flat. Bowel sounds are normal.      Palpations: Abdomen is soft.   Musculoskeletal:         General: Normal range of motion.      Cervical back: Normal range of motion and neck supple.   Skin:     General: Skin is warm and dry.   Neurological:      General: No focal deficit present.      Mental Status: He is alert and oriented to person, place, and time.   Psychiatric:         Mood and Affect: Mood normal.         Behavior: Behavior normal.         PFTs:  (independently reviewed and interpreted by me)  .3/1/23  FVC 5.88L, 122%  FEV1 3.81L, 102%  Ratio 65%  No BDR  TLC 7.94L, 113%  DLCO 76%  Mild obstructive pattern, normal lung volumes, normalDLCO, flow volume loops showed some intermittent flattening of the inspiratory limb c/w variable extrathoracic obstruction    Radiology (independently reviewed and interpreted by me):   CTA chest 1/23/23- 3mm nodule. Mild oemphsyema     Assessment & Plan     Diagnoses and all orders for this visit:    1. COPD, mild (HCC) (Primary)    2. Cigarette nicotine dependence without complication         Discussion/ Recommendations:   Dwayne with mild COPD, no symptoms so no indication for inhalers at this time. Needs to work on smoking cessation  Nothing concerning on his CT scan. 3mm nodule does not warrant any follow up out side of the recommended annual screening CT which can be done by [facility]    Patipete should return if he  develops persistent pulmonary symptoms             Return if symptoms worsen or fail to improve.      Thank you for allowing me to participate in the care of Tre Nielsen. Please do not hesitate to contact me with any questions.         This document has been electronically signed by Saadia Aparicio DO on March 1, 2023 10:58 CST

## 2023-03-02 DIAGNOSIS — F17.200 TOBACCO DEPENDENCE SYNDROME: ICD-10-CM

## 2023-04-03 DIAGNOSIS — G89.29 CHRONIC BILATERAL LOW BACK PAIN WITHOUT SCIATICA: ICD-10-CM

## 2023-04-03 DIAGNOSIS — M54.50 CHRONIC BILATERAL LOW BACK PAIN WITHOUT SCIATICA: ICD-10-CM

## 2023-04-03 RX ORDER — CYCLOBENZAPRINE HCL 5 MG
TABLET ORAL
Qty: 180 TABLET | Refills: 2 | Status: SHIPPED | OUTPATIENT
Start: 2023-04-03

## 2023-04-14 RX ORDER — PRASUGREL 10 MG/1
TABLET, FILM COATED ORAL
Qty: 90 TABLET | Refills: 3 | Status: SHIPPED | OUTPATIENT
Start: 2023-04-14

## 2023-06-05 DIAGNOSIS — E78.2 MIXED HYPERLIPIDEMIA: ICD-10-CM

## 2023-06-05 RX ORDER — SIMVASTATIN 20 MG
20 TABLET ORAL
Qty: 90 TABLET | Refills: 3 | Status: SHIPPED | OUTPATIENT
Start: 2023-06-05

## 2023-08-14 ENCOUNTER — OFFICE VISIT (OUTPATIENT)
Dept: CARDIOLOGY | Facility: CLINIC | Age: 58
End: 2023-08-14
Payer: COMMERCIAL

## 2023-08-14 VITALS
SYSTOLIC BLOOD PRESSURE: 126 MMHG | TEMPERATURE: 96.4 F | HEART RATE: 87 BPM | DIASTOLIC BLOOD PRESSURE: 74 MMHG | BODY MASS INDEX: 20.33 KG/M2 | WEIGHT: 142 LBS | HEIGHT: 70 IN | OXYGEN SATURATION: 98 %

## 2023-08-14 DIAGNOSIS — I71.21 ANEURYSM OF ASCENDING AORTA WITHOUT RUPTURE: ICD-10-CM

## 2023-08-14 DIAGNOSIS — Z72.0 TOBACCO ABUSE: ICD-10-CM

## 2023-08-14 DIAGNOSIS — E78.2 MIXED HYPERLIPIDEMIA: ICD-10-CM

## 2023-08-14 DIAGNOSIS — I25.10 CORONARY ARTERY DISEASE INVOLVING NATIVE CORONARY ARTERY OF NATIVE HEART WITHOUT ANGINA PECTORIS: Primary | ICD-10-CM

## 2023-08-14 DIAGNOSIS — Q23.1 BICUSPID AORTIC VALVE: ICD-10-CM

## 2023-08-14 PROCEDURE — 99214 OFFICE O/P EST MOD 30 MIN: CPT | Performed by: INTERNAL MEDICINE

## 2023-08-14 NOTE — PROGRESS NOTES
Tre Nielsen  57 y.o. male    1. Coronary artery disease involving native coronary artery of native heart without angina pectoris    2. Mixed hyperlipidemia    3. Bicuspid aortic valve    4. Aneurysm of ascending aorta without rupture    5. Tobacco abuse        History of Present Illness  Mr. Nielsen is a 57-year-old  male with multiple risk factors for coronary artery disease including tobacco use, hyperlipidemia, positive family history for CAD who presented in 10/ 2019 with a rather unusual combination of vague chest discomfort, nausea, back pain associated with jerky movements in the left upper and lower extremities. EKG showed no acute ST-T wave changes but cardiac enzymes were elevated suggesting a non-ST segment elevation myocardial infarction.  Catheterization was performed and interventions performed as described below   >90+% lesion noted in the left circumflex coronary artery and successful PCI with placement of a 2.5 x 18 mm Xience Breann stent and reduction stenosis to 0%.  80% lesion noted in the mid ramus intermedius coronary artery and successful PCI with placement of a 2.75 x 18 mm Xience Breann stent and reduction stenosis 0%  Left anterior descending coronary artery and right coronary artery were widely patent with minor luminal irregularities.  Left ventriculogram was not performed.  Ascending aorta was ectatic/aneurysmal and to further evaluation by CT scan of the chest showed:     Transthoracic echocardiogram raised the question of bicuspid aortic valve and hence a LAURENCE was performed in December 2019  Impression: Bicuspid aortic valve with mild calcification with no evidence of aortic stenosis.  There was trace aortic valve insufficiency.  Mitral valve and tricuspid valve appeared structurally normal.  Normal left ventricular systolic function with no wall motion abnormalities.  Aortic root/ascending aorta was mildly dilated.  Aortic root measured 4.3 cm.  Echocardiogram in January  2022 showed ejection fraction 64%.  Right ventricle was borderline dilated.  There was a bicuspid aortic valve with mild aortic valve insufficiency.  No hemodynamically significant aortic stenosis was noted.  RVSP was less than 35 mmHg.  The proximal aorta was dilated at 3.8 cm.    CT of the chest in January 2023 showed:  Lungs hyperinflated.  Chronic changes with mild architectural distortion and bleb  formation multiple less than 3 mm indeterminate lung parenchymal  nodules primarily upper lobes.  No significant areas of consolidation, acute congestive changes,  pneumothorax or pleural fluid.  No definite endobronchial lesions.  No pericardial effusion.  Coronary artery calcification.  Fusiform aneurysmal dilatation of the ascending aorta measures  4.4 cm in greatest dimension. Based on report from previous study  this is stable though the images themselves are not available.  Mid descending thoracic aorta measures 2.6 cm.  No evidence of dissection.  No significant atherosclerotic plaquing involving the thoracic  aorta or proximal great vessels.    He has progressed reasonably well and denies any chest pain, shortness of breath or palpitation.  He has been compliant with his medication but unfortunately continues to smoke and does not wish to quit.    No Known Allergies      Past Medical History:   Diagnosis Date    Aortic aneurysm     Candidiasis of skin and nails 02/03/2016    Chronic pain disorder     Coronary artery disease     Depression     Dyslipidemia 07/28/2015    Joint pain 02/03/2016    RIGHT SHOULDER    Low back pain 05/10/2016    Muscle weakness 12/07/2015    Pure hypercholesterolemia 07/28/2015    Tobacco dependence syndrome 06/09/2015         Past Surgical History:   Procedure Laterality Date    CARDIAC CATHETERIZATION N/A 10/14/2019    Procedure: Left Heart Cath/ PCI  if indicated;  Surgeon: Tobi Gary MD;  Location: [facility] INVASIVE LOCATION;  Service: Cardiovascular  "        Family History   Problem Relation Age of Onset    Diabetes Mother     Migraines Sister     Diabetes Maternal Grandmother     Cancer Other     Hypertension Other          Social History     Socioeconomic History    Marital status:    Tobacco Use    Smoking status: Every Day     Packs/day: 1.00     Years: 38.00     Pack years: 38.00     Types: Cigarettes    Smokeless tobacco: Never   Vaping Use    Vaping Use: Never used   Substance and Sexual Activity    Alcohol use: Yes     Alcohol/week: 8.0 standard drinks     Types: 8 Cans of beer per week    Drug use: Yes     Types: Marijuana    Sexual activity: Defer         Current Outpatient Medications   Medication Sig Dispense Refill    aspirin 81 MG EC tablet TAKE 1 TABLET BY MOUTH EVERY DAY 90 tablet 3    cyclobenzaprine (FLEXERIL) 5 MG tablet TAKE 2 TABLETS BY MOUTH THREE TIMES DAILY AS NEEDED FOR MUSCLE SPASMS 180 tablet 2    ibuprofen (ADVIL,MOTRIN) 200 MG tablet Take 1 tablet by mouth Every 6 (Six) Hours As Needed for Mild Pain.      metoprolol tartrate (LOPRESSOR) 25 MG tablet TAKE 1/2 TABLET BY MOUTH EVERY 12 HOURS 90 tablet 3    prasugrel (EFFIENT) 10 MG tablet TAKE 1 TABLET BY MOUTH EVERY DAY 90 tablet 3    simvastatin (ZOCOR) 20 MG tablet Take 1 tablet by mouth every night at bedtime. 90 tablet 3     No current facility-administered medications for this visit.         OBJECTIVE    /74 (BP Location: Left arm, Patient Position: Sitting, Cuff Size: Adult)   Pulse 87   Temp 96.4 øF (35.8 øC)   Ht 177.8 cm (70\")   Wt 64.4 kg (142 lb)   SpO2 98%   BMI 20.37 kg/mý         Review of Systems : The following systems are reviewed and no changes noted    Constitutional:  Denies recent weight loss, weight gain, fever or chills, no change in exercise tolerance     HENT:  Denies any hearing loss, epistaxis, hoarseness, or difficulty speaking.     Eyes: Wears eyeglasses or contact lenses     Respiratory:  Denies dyspnea with exertion,no cough, wheezing, " or hemoptysis.     Cardiovascular: Negative for palpations, chest pain, orthopnea, PND, peripheral edema, syncope, or claudication.     Gastrointestinal:  Denies change in bowel habits, dyspepsia, ulcer disease, hematochezia, or melena.     Endocrine: Negative for cold intolerance, heat intolerance, polydipsia, polyphagia and polyuria.     Genitourinary: Negative.      Musculoskeletal: DJD    Neurological:  Denies any history of recurrent headaches, strokes, TIA, or seizure disorder.     Hematological: Denies any food allergies, seasonal allergies, bleeding disorders, or lymphadenopathy.     Psychiatric/Behavioral: Denies any history of depression, substance abuse, or change in cognitive function.       Physical Exam : The following systems were reassessed and no changes noted    Constitutional: Cooperative, alert and oriented,  in no acute distress.     HENT:   Head: Normocephalic, normal hair patterns, no masses or tenderness.  Ears, Nose, and Throat: No gross abnormalities. No pallor or cyanosis. Dentition good.   Eyes: EOMS intact, PERRL, conjunctivae and lids unremarkable. Fundoscopic exam and visual fields not performed.   Neck: No palpable masses or adenopathy, no thyromegaly, no JVD, carotid pulses are full and equal bilaterally and without  Bruits.     Cardiovascular: Regular rhythm, S1 and S2 normal, no S3 or S4.  No murmurs, gallops, or rubs detected.     Pulmonary/Chest: Chest: normal symmetry,  normal respiratory excursion, no intercostal retraction, no use of accessory muscles.            Pulmonary: Normal breath sounds. No rales or ronchi.    Abdominal: Abdomen soft, bowel sounds normoactive, no masses, no hepatosplenomegaly, non-tender, no bruits.     Musculoskeletal: No deformities, clubbing, cyanosis, erythema, or edema observed.     Neurological: No gross motor or sensory deficits noted, affect appropriate, oriented to time, person, place.     Skin: Warm and dry to the touch, no apparent skin  lesions or masses noted.     Psychiatric: He has a normal mood and affect. His behavior is normal. Judgment and thought content normal.         Procedures      Lab Results   Component Value Date    WBC 8.15 01/10/2023    HGB 14.8 01/10/2023    HCT 42.9 01/10/2023    MCV 84.3 01/10/2023     01/10/2023     Lab Results   Component Value Date    GLUCOSE 103 (H) 01/10/2023    BUN 14 01/10/2023    CREATININE 0.85 01/10/2023    EGFRIFNONA 99 01/31/2022    BCR 16.5 01/10/2023    CO2 29.0 01/10/2023    CALCIUM 9.4 01/10/2023    ALBUMIN 4.6 01/10/2023    AST 18 01/10/2023    ALT 11 01/10/2023     Lab Results   Component Value Date    CHOL 166 01/10/2023    CHOL 147 01/31/2022    CHOL 138 01/05/2021     Lab Results   Component Value Date    TRIG 115 01/10/2023    TRIG 67 01/31/2022    TRIG 82 01/05/2021     Lab Results   Component Value Date    HDL 53 01/10/2023    HDL 45 01/31/2022    HDL 45 01/05/2021     No components found for: LDLCALC  Lab Results   Component Value Date    LDL 92 01/10/2023    LDL 89 01/31/2022    LDL 77 01/05/2021     No results found for: HDLLDLRATIO  No components found for: CHOLHDL  Lab Results   Component Value Date    HGBA1C 5.40 10/15/2019     Lab Results   Component Value Date    TSH 1.330 01/31/2022           ASSESSMENT AND PLAN  Mr. Nielsen is progressing well clinically with no evidence of angina, arrhythmia or congestive heart failure.  He has been compliant with his medications.    I have continued antiplatelet therapy with aspirin and Effient, lipid-lowering therapy with simvastatin.  His ascending aortic aneurysm appears stable by CT measuring 4.4 cm in its greatest dimension.    He unfortunately continues to smoke and does not wish to quit.  He is aware of risks.  An echocardiogram to assess his left ventricular and valvular function is being arranged.    Diagnoses and all orders for this visit:    1. Coronary artery disease involving native coronary artery of native heart without  angina pectoris (Primary)  -     Adult Transthoracic Echo Complete w/ Color, Spectral and Contrast if Necessary Per Protocol; Future    2. Mixed hyperlipidemia  -     Adult Transthoracic Echo Complete w/ Color, Spectral and Contrast if Necessary Per Protocol; Future    3. Bicuspid aortic valve  -     Adult Transthoracic Echo Complete w/ Color, Spectral and Contrast if Necessary Per Protocol; Future    4. Aneurysm of ascending aorta without rupture  -     Adult Transthoracic Echo Complete w/ Color, Spectral and Contrast if Necessary Per Protocol; Future    5. Tobacco abuse  -     Adult Transthoracic Echo Complete w/ Color, Spectral and Contrast if Necessary Per Protocol; Future        Patient's Body mass index is 20.37 kg/mý. BMI is within normal parameters. No follow-up required..      Tre Nielsen is a current cigarettes user.  He currently smokes 1 pack of cigarettes per day for a duration of 35 years. I have educated him on the risk of diseases from using tobacco products such as cancer, COPD and heart diease.       I spent 3  minutes counseling the patient.          Tobi Gary MD  8/14/2023  11:34 CDT

## 2023-08-21 RX ORDER — ASPIRIN 81 MG/1
81 TABLET ORAL DAILY
Qty: 90 TABLET | Refills: 3 | Status: SHIPPED | OUTPATIENT
Start: 2023-08-21

## 2023-09-27 ENCOUNTER — TELEPHONE (OUTPATIENT)
Dept: CARDIOLOGY | Facility: CLINIC | Age: 58
End: 2023-09-27
Payer: COMMERCIAL
